# Patient Record
Sex: MALE | Race: WHITE | NOT HISPANIC OR LATINO | URBAN - METROPOLITAN AREA
[De-identification: names, ages, dates, MRNs, and addresses within clinical notes are randomized per-mention and may not be internally consistent; named-entity substitution may affect disease eponyms.]

---

## 2021-03-04 ENCOUNTER — APPOINTMENT (OUTPATIENT)
Dept: CT IMAGING | Facility: HOSPITAL | Age: 66
End: 2021-03-04

## 2021-03-04 ENCOUNTER — APPOINTMENT (OUTPATIENT)
Dept: CARDIOLOGY | Facility: HOSPITAL | Age: 66
End: 2021-03-04

## 2021-03-04 ENCOUNTER — APPOINTMENT (OUTPATIENT)
Dept: GENERAL RADIOLOGY | Facility: HOSPITAL | Age: 66
End: 2021-03-04

## 2021-03-04 ENCOUNTER — HOSPITAL ENCOUNTER (INPATIENT)
Facility: HOSPITAL | Age: 66
LOS: 7 days | Discharge: NURSING, REHAB OR LONG-TERM CARE | End: 2021-03-11
Attending: EMERGENCY MEDICINE | Admitting: INTERNAL MEDICINE

## 2021-03-04 DIAGNOSIS — F32.2 SEVERE DEPRESSION (HCC): ICD-10-CM

## 2021-03-04 DIAGNOSIS — J18.9 PNEUMONIA DUE TO INFECTIOUS ORGANISM, UNSPECIFIED LATERALITY, UNSPECIFIED PART OF LUNG: Primary | ICD-10-CM

## 2021-03-04 DIAGNOSIS — R09.02 HYPOXIA: ICD-10-CM

## 2021-03-04 PROBLEM — G47.33 OSA (OBSTRUCTIVE SLEEP APNEA): Status: ACTIVE | Noted: 2021-03-04

## 2021-03-04 PROBLEM — J44.9 COPD (CHRONIC OBSTRUCTIVE PULMONARY DISEASE) (HCC): Status: ACTIVE | Noted: 2021-03-04

## 2021-03-04 PROBLEM — F03.90 DEMENTIA (HCC): Status: ACTIVE | Noted: 2021-03-04

## 2021-03-04 PROBLEM — I50.32 DIASTOLIC CHF, CHRONIC (HCC): Status: ACTIVE | Noted: 2021-03-04

## 2021-03-04 PROBLEM — E78.5 HLD (HYPERLIPIDEMIA): Status: ACTIVE | Noted: 2021-03-04

## 2021-03-04 PROBLEM — E11.9 DM (DIABETES MELLITUS), TYPE 2 (HCC): Status: ACTIVE | Noted: 2021-03-04

## 2021-03-04 PROBLEM — I10 HTN (HYPERTENSION): Status: ACTIVE | Noted: 2021-03-04

## 2021-03-04 PROBLEM — E87.20 LACTIC ACIDOSIS: Status: ACTIVE | Noted: 2021-03-04

## 2021-03-04 PROBLEM — R79.89 ELEVATED D-DIMER: Status: ACTIVE | Noted: 2021-03-04

## 2021-03-04 PROBLEM — D72.829 LEUKOCYTOSIS: Status: ACTIVE | Noted: 2021-03-04

## 2021-03-04 PROBLEM — Z86.73 HISTORY OF STROKE: Status: ACTIVE | Noted: 2021-03-04

## 2021-03-04 PROBLEM — K21.9 GERD WITHOUT ESOPHAGITIS: Status: ACTIVE | Noted: 2021-03-04

## 2021-03-04 LAB
ALBUMIN SERPL-MCNC: 2.4 G/DL (ref 3.5–5.2)
ALBUMIN/GLOB SERPL: 0.6 G/DL
ALP SERPL-CCNC: 76 U/L (ref 39–117)
ALT SERPL W P-5'-P-CCNC: 49 U/L (ref 1–41)
ANION GAP SERPL CALCULATED.3IONS-SCNC: 14 MMOL/L (ref 5–15)
ARTERIAL PATENCY WRIST A: POSITIVE
AST SERPL-CCNC: 42 U/L (ref 1–40)
ATMOSPHERIC PRESS: 751.7 MMHG
B PARAPERT DNA SPEC QL NAA+PROBE: NOT DETECTED
B PERT DNA SPEC QL NAA+PROBE: NOT DETECTED
BASE EXCESS BLDA CALC-SCNC: -0.1 MMOL/L (ref 0–2)
BASOPHILS # BLD AUTO: 0.07 10*3/MM3 (ref 0–0.2)
BASOPHILS NFR BLD AUTO: 0.4 % (ref 0–1.5)
BDY SITE: ABNORMAL
BH CV LOWER VASCULAR LEFT COMMON FEMORAL AUGMENT: NORMAL
BH CV LOWER VASCULAR LEFT COMMON FEMORAL COMPETENT: NORMAL
BH CV LOWER VASCULAR LEFT COMMON FEMORAL COMPRESS: NORMAL
BH CV LOWER VASCULAR LEFT COMMON FEMORAL PHASIC: NORMAL
BH CV LOWER VASCULAR LEFT COMMON FEMORAL SPONT: NORMAL
BH CV LOWER VASCULAR LEFT DISTAL FEMORAL COMPRESS: NORMAL
BH CV LOWER VASCULAR LEFT GASTRONEMIUS COMPRESS: NORMAL
BH CV LOWER VASCULAR LEFT GREATER SAPH AK COMPRESS: NORMAL
BH CV LOWER VASCULAR LEFT GREATER SAPH BK COMPRESS: NORMAL
BH CV LOWER VASCULAR LEFT LESSER SAPH COMPRESS: NORMAL
BH CV LOWER VASCULAR LEFT MID FEMORAL AUGMENT: NORMAL
BH CV LOWER VASCULAR LEFT MID FEMORAL COMPETENT: NORMAL
BH CV LOWER VASCULAR LEFT MID FEMORAL COMPRESS: NORMAL
BH CV LOWER VASCULAR LEFT MID FEMORAL PHASIC: NORMAL
BH CV LOWER VASCULAR LEFT MID FEMORAL SPONT: NORMAL
BH CV LOWER VASCULAR LEFT PERONEAL COMPRESS: NORMAL
BH CV LOWER VASCULAR LEFT POPLITEAL AUGMENT: NORMAL
BH CV LOWER VASCULAR LEFT POPLITEAL COMPETENT: NORMAL
BH CV LOWER VASCULAR LEFT POPLITEAL COMPRESS: NORMAL
BH CV LOWER VASCULAR LEFT POPLITEAL PHASIC: NORMAL
BH CV LOWER VASCULAR LEFT POPLITEAL SPONT: NORMAL
BH CV LOWER VASCULAR LEFT POSTERIOR TIBIAL COMPRESS: NORMAL
BH CV LOWER VASCULAR LEFT PROFUNDA FEMORAL COMPRESS: NORMAL
BH CV LOWER VASCULAR LEFT PROXIMAL FEMORAL COMPRESS: NORMAL
BH CV LOWER VASCULAR LEFT SAPHENOFEMORAL JUNCTION COMPRESS: NORMAL
BH CV LOWER VASCULAR RIGHT COMMON FEMORAL AUGMENT: NORMAL
BH CV LOWER VASCULAR RIGHT COMMON FEMORAL COMPETENT: NORMAL
BH CV LOWER VASCULAR RIGHT COMMON FEMORAL COMPRESS: NORMAL
BH CV LOWER VASCULAR RIGHT COMMON FEMORAL PHASIC: NORMAL
BH CV LOWER VASCULAR RIGHT COMMON FEMORAL SPONT: NORMAL
BH CV LOWER VASCULAR RIGHT DISTAL FEMORAL COMPRESS: NORMAL
BH CV LOWER VASCULAR RIGHT GASTRONEMIUS COMPRESS: NORMAL
BH CV LOWER VASCULAR RIGHT GREATER SAPH AK COMPRESS: NORMAL
BH CV LOWER VASCULAR RIGHT GREATER SAPH BK COMPRESS: NORMAL
BH CV LOWER VASCULAR RIGHT LESSER SAPH COMPRESS: NORMAL
BH CV LOWER VASCULAR RIGHT MID FEMORAL AUGMENT: NORMAL
BH CV LOWER VASCULAR RIGHT MID FEMORAL COMPETENT: NORMAL
BH CV LOWER VASCULAR RIGHT MID FEMORAL COMPRESS: NORMAL
BH CV LOWER VASCULAR RIGHT MID FEMORAL PHASIC: NORMAL
BH CV LOWER VASCULAR RIGHT MID FEMORAL SPONT: NORMAL
BH CV LOWER VASCULAR RIGHT PERONEAL COMPRESS: NORMAL
BH CV LOWER VASCULAR RIGHT POPLITEAL AUGMENT: NORMAL
BH CV LOWER VASCULAR RIGHT POPLITEAL COMPETENT: NORMAL
BH CV LOWER VASCULAR RIGHT POPLITEAL COMPRESS: NORMAL
BH CV LOWER VASCULAR RIGHT POPLITEAL PHASIC: NORMAL
BH CV LOWER VASCULAR RIGHT POPLITEAL SPONT: NORMAL
BH CV LOWER VASCULAR RIGHT POSTERIOR TIBIAL COMPRESS: NORMAL
BH CV LOWER VASCULAR RIGHT PROFUNDA FEMORAL COMPRESS: NORMAL
BH CV LOWER VASCULAR RIGHT PROXIMAL FEMORAL COMPRESS: NORMAL
BH CV LOWER VASCULAR RIGHT SAPHENOFEMORAL JUNCTION COMPRESS: NORMAL
BILIRUB SERPL-MCNC: 0.8 MG/DL (ref 0–1.2)
BUN SERPL-MCNC: 18 MG/DL (ref 8–23)
BUN/CREAT SERPL: 23.4 (ref 7–25)
C PNEUM DNA NPH QL NAA+NON-PROBE: NOT DETECTED
CALCIUM SPEC-SCNC: 8.7 MG/DL (ref 8.6–10.5)
CHLORIDE SERPL-SCNC: 100 MMOL/L (ref 98–107)
CO2 SERPL-SCNC: 22 MMOL/L (ref 22–29)
CREAT SERPL-MCNC: 0.77 MG/DL (ref 0.76–1.27)
D DIMER PPP FEU-MCNC: 0.72 MCGFEU/ML (ref 0–0.49)
D-LACTATE SERPL-SCNC: 1.5 MMOL/L (ref 0.5–2)
D-LACTATE SERPL-SCNC: 2.1 MMOL/L (ref 0.5–2)
DEPRECATED RDW RBC AUTO: 44.2 FL (ref 37–54)
EOSINOPHIL # BLD AUTO: 0.02 10*3/MM3 (ref 0–0.4)
EOSINOPHIL NFR BLD AUTO: 0.1 % (ref 0.3–6.2)
ERYTHROCYTE [DISTWIDTH] IN BLOOD BY AUTOMATED COUNT: 15.4 % (ref 12.3–15.4)
FLUAV SUBTYP SPEC NAA+PROBE: NOT DETECTED
FLUBV RNA ISLT QL NAA+PROBE: NOT DETECTED
GAS FLOW AIRWAY: 6 LPM
GFR SERPL CREATININE-BSD FRML MDRD: 102 ML/MIN/1.73
GLOBULIN UR ELPH-MCNC: 4.2 GM/DL
GLUCOSE BLDC GLUCOMTR-MCNC: 108 MG/DL (ref 70–130)
GLUCOSE BLDC GLUCOMTR-MCNC: 116 MG/DL (ref 70–130)
GLUCOSE BLDC GLUCOMTR-MCNC: 116 MG/DL (ref 70–130)
GLUCOSE SERPL-MCNC: 136 MG/DL (ref 65–99)
HADV DNA SPEC NAA+PROBE: NOT DETECTED
HCO3 BLDA-SCNC: 25.1 MMOL/L (ref 22–28)
HCOV 229E RNA SPEC QL NAA+PROBE: NOT DETECTED
HCOV HKU1 RNA SPEC QL NAA+PROBE: NOT DETECTED
HCOV NL63 RNA SPEC QL NAA+PROBE: NOT DETECTED
HCOV OC43 RNA SPEC QL NAA+PROBE: NOT DETECTED
HCT VFR BLD AUTO: 42.3 % (ref 37.5–51)
HGB BLD-MCNC: 14.4 G/DL (ref 13–17.7)
HMPV RNA NPH QL NAA+NON-PROBE: NOT DETECTED
HPIV1 RNA SPEC QL NAA+PROBE: NOT DETECTED
HPIV2 RNA SPEC QL NAA+PROBE: NOT DETECTED
HPIV3 RNA NPH QL NAA+PROBE: NOT DETECTED
HPIV4 P GENE NPH QL NAA+PROBE: NOT DETECTED
IMM GRANULOCYTES # BLD AUTO: 0.24 10*3/MM3 (ref 0–0.05)
IMM GRANULOCYTES NFR BLD AUTO: 1.3 % (ref 0–0.5)
L PNEUMO1 AG UR QL IA: NEGATIVE
LACTATE HOLD SPECIMEN: NORMAL
LIPASE SERPL-CCNC: 7 U/L (ref 13–60)
LYMPHOCYTES # BLD AUTO: 0.86 10*3/MM3 (ref 0.7–3.1)
LYMPHOCYTES NFR BLD AUTO: 4.6 % (ref 19.6–45.3)
M PNEUMO IGG SER IA-ACNC: NOT DETECTED
MCH RBC QN AUTO: 27.7 PG (ref 26.6–33)
MCHC RBC AUTO-ENTMCNC: 34 G/DL (ref 31.5–35.7)
MCV RBC AUTO: 81.3 FL (ref 79–97)
MODALITY: ABNORMAL
MONOCYTES # BLD AUTO: 1.13 10*3/MM3 (ref 0.1–0.9)
MONOCYTES NFR BLD AUTO: 6 % (ref 5–12)
MRSA DNA SPEC QL NAA+PROBE: NORMAL
NEUTROPHILS NFR BLD AUTO: 16.47 10*3/MM3 (ref 1.7–7)
NEUTROPHILS NFR BLD AUTO: 87.6 % (ref 42.7–76)
NRBC BLD AUTO-RTO: 0.1 /100 WBC (ref 0–0.2)
NT-PROBNP SERPL-MCNC: 383.8 PG/ML (ref 0–900)
PCO2 BLDA: 41.7 MM HG (ref 35–45)
PH BLDA: 7.39 PH UNITS (ref 7.35–7.45)
PLATELET # BLD AUTO: 369 10*3/MM3 (ref 140–450)
PMV BLD AUTO: 9.3 FL (ref 6–12)
PO2 BLDA: 55.9 MM HG (ref 80–100)
POTASSIUM SERPL-SCNC: 3.5 MMOL/L (ref 3.5–5.2)
PROCALCITONIN SERPL-MCNC: 0.17 NG/ML (ref 0–0.25)
PROT SERPL-MCNC: 6.6 G/DL (ref 6–8.5)
QT INTERVAL: 382 MS
RBC # BLD AUTO: 5.2 10*6/MM3 (ref 4.14–5.8)
RHINOVIRUS RNA SPEC NAA+PROBE: NOT DETECTED
RSV RNA NPH QL NAA+NON-PROBE: NOT DETECTED
S PNEUM AG SPEC QL LA: NEGATIVE
SAO2 % BLDCOA: 88.2 % (ref 92–99)
SARS-COV-2 ORF1AB RESP QL NAA+PROBE: NOT DETECTED
SARS-COV-2 RNA NPH QL NAA+NON-PROBE: NOT DETECTED
SET MECH RESP RATE: 22
SODIUM SERPL-SCNC: 136 MMOL/L (ref 136–145)
T4 FREE SERPL-MCNC: 1.23 NG/DL (ref 0.93–1.7)
TROPONIN T SERPL-MCNC: <0.01 NG/ML (ref 0–0.03)
WBC # BLD AUTO: 18.79 10*3/MM3 (ref 3.4–10.8)

## 2021-03-04 PROCEDURE — 87899 AGENT NOS ASSAY W/OPTIC: CPT | Performed by: NURSE PRACTITIONER

## 2021-03-04 PROCEDURE — 82962 GLUCOSE BLOOD TEST: CPT

## 2021-03-04 PROCEDURE — 82803 BLOOD GASES ANY COMBINATION: CPT

## 2021-03-04 PROCEDURE — 36600 WITHDRAWAL OF ARTERIAL BLOOD: CPT

## 2021-03-04 PROCEDURE — 87040 BLOOD CULTURE FOR BACTERIA: CPT | Performed by: PHYSICIAN ASSISTANT

## 2021-03-04 PROCEDURE — 0 IOPAMIDOL PER 1 ML: Performed by: EMERGENCY MEDICINE

## 2021-03-04 PROCEDURE — 93970 EXTREMITY STUDY: CPT

## 2021-03-04 PROCEDURE — U0004 COV-19 TEST NON-CDC HGH THRU: HCPCS | Performed by: PHYSICIAN ASSISTANT

## 2021-03-04 PROCEDURE — 85025 COMPLETE CBC W/AUTO DIFF WBC: CPT | Performed by: PHYSICIAN ASSISTANT

## 2021-03-04 PROCEDURE — 25010000002 FUROSEMIDE PER 20 MG: Performed by: NURSE PRACTITIONER

## 2021-03-04 PROCEDURE — 94760 N-INVAS EAR/PLS OXIMETRY 1: CPT

## 2021-03-04 PROCEDURE — 83605 ASSAY OF LACTIC ACID: CPT | Performed by: PHYSICIAN ASSISTANT

## 2021-03-04 PROCEDURE — 94799 UNLISTED PULMONARY SVC/PX: CPT

## 2021-03-04 PROCEDURE — 25010000002 PIPERACILLIN SOD-TAZOBACTAM PER 1 G: Performed by: HOSPITALIST

## 2021-03-04 PROCEDURE — 99284 EMERGENCY DEPT VISIT MOD MDM: CPT

## 2021-03-04 PROCEDURE — 83880 ASSAY OF NATRIURETIC PEPTIDE: CPT | Performed by: PHYSICIAN ASSISTANT

## 2021-03-04 PROCEDURE — 25010000002 METHYLPREDNISOLONE PER 40 MG: Performed by: INTERNAL MEDICINE

## 2021-03-04 PROCEDURE — 25010000002 VANCOMYCIN 10 G RECONSTITUTED SOLUTION: Performed by: HOSPITALIST

## 2021-03-04 PROCEDURE — U0005 INFEC AGEN DETEC AMPLI PROBE: HCPCS | Performed by: PHYSICIAN ASSISTANT

## 2021-03-04 PROCEDURE — 71045 X-RAY EXAM CHEST 1 VIEW: CPT

## 2021-03-04 PROCEDURE — 83690 ASSAY OF LIPASE: CPT | Performed by: PHYSICIAN ASSISTANT

## 2021-03-04 PROCEDURE — 25010000002 ENOXAPARIN PER 10 MG: Performed by: HOSPITALIST

## 2021-03-04 PROCEDURE — 84484 ASSAY OF TROPONIN QUANT: CPT | Performed by: PHYSICIAN ASSISTANT

## 2021-03-04 PROCEDURE — 93005 ELECTROCARDIOGRAM TRACING: CPT | Performed by: PHYSICIAN ASSISTANT

## 2021-03-04 PROCEDURE — 87641 MR-STAPH DNA AMP PROBE: CPT | Performed by: HOSPITALIST

## 2021-03-04 PROCEDURE — 93010 ELECTROCARDIOGRAM REPORT: CPT | Performed by: INTERNAL MEDICINE

## 2021-03-04 PROCEDURE — 80053 COMPREHEN METABOLIC PANEL: CPT | Performed by: PHYSICIAN ASSISTANT

## 2021-03-04 PROCEDURE — 25010000002 ENOXAPARIN PER 10 MG: Performed by: NURSE PRACTITIONER

## 2021-03-04 PROCEDURE — 72110 X-RAY EXAM L-2 SPINE 4/>VWS: CPT

## 2021-03-04 PROCEDURE — 84145 PROCALCITONIN (PCT): CPT | Performed by: HOSPITALIST

## 2021-03-04 PROCEDURE — 71275 CT ANGIOGRAPHY CHEST: CPT

## 2021-03-04 PROCEDURE — 25010000002 PIPERACILLIN SOD-TAZOBACTAM PER 1 G: Performed by: PHYSICIAN ASSISTANT

## 2021-03-04 PROCEDURE — 25010000002 VANCOMYCIN 10 G RECONSTITUTED SOLUTION: Performed by: PHYSICIAN ASSISTANT

## 2021-03-04 PROCEDURE — 94660 CPAP INITIATION&MGMT: CPT

## 2021-03-04 PROCEDURE — 84439 ASSAY OF FREE THYROXINE: CPT | Performed by: INTERNAL MEDICINE

## 2021-03-04 PROCEDURE — 0202U NFCT DS 22 TRGT SARS-COV-2: CPT | Performed by: INTERNAL MEDICINE

## 2021-03-04 PROCEDURE — 85379 FIBRIN DEGRADATION QUANT: CPT | Performed by: PHYSICIAN ASSISTANT

## 2021-03-04 RX ORDER — ALLOPURINOL 100 MG/1
200 TABLET ORAL DAILY
COMMUNITY

## 2021-03-04 RX ORDER — DIVALPROEX SODIUM 250 MG/1
250 TABLET, DELAYED RELEASE ORAL 2 TIMES DAILY
COMMUNITY

## 2021-03-04 RX ORDER — BUSPIRONE HYDROCHLORIDE 15 MG/1
15 TABLET ORAL 3 TIMES DAILY
COMMUNITY

## 2021-03-04 RX ORDER — PREDNISONE 20 MG/1
40 TABLET ORAL DAILY
COMMUNITY
Start: 2021-03-04 | End: 2021-03-11 | Stop reason: HOSPADM

## 2021-03-04 RX ORDER — MONTELUKAST SODIUM 10 MG/1
10 TABLET ORAL NIGHTLY
COMMUNITY

## 2021-03-04 RX ORDER — LEVOTHYROXINE SODIUM 0.03 MG/1
25 TABLET ORAL DAILY
COMMUNITY

## 2021-03-04 RX ORDER — LISINOPRIL 2.5 MG/1
2.5 TABLET ORAL DAILY
COMMUNITY

## 2021-03-04 RX ORDER — ALBUTEROL SULFATE 90 UG/1
2 AEROSOL, METERED RESPIRATORY (INHALATION) EVERY 4 HOURS PRN
COMMUNITY

## 2021-03-04 RX ORDER — BUPROPION HYDROCHLORIDE 150 MG/1
450 TABLET, EXTENDED RELEASE ORAL DAILY
COMMUNITY

## 2021-03-04 RX ORDER — METOPROLOL TARTRATE 50 MG/1
50 TABLET, FILM COATED ORAL 2 TIMES DAILY
COMMUNITY

## 2021-03-04 RX ORDER — FUROSEMIDE 10 MG/ML
40 INJECTION INTRAMUSCULAR; INTRAVENOUS ONCE
Status: COMPLETED | OUTPATIENT
Start: 2021-03-04 | End: 2021-03-04

## 2021-03-04 RX ORDER — ONDANSETRON 4 MG/1
4 TABLET, FILM COATED ORAL EVERY 6 HOURS PRN
Status: DISCONTINUED | OUTPATIENT
Start: 2021-03-04 | End: 2021-03-11 | Stop reason: HOSPADM

## 2021-03-04 RX ORDER — DIVALPROEX SODIUM 125 MG/1
125 TABLET, DELAYED RELEASE ORAL 2 TIMES DAILY
COMMUNITY
Start: 2021-03-03 | End: 2021-03-11 | Stop reason: HOSPADM

## 2021-03-04 RX ORDER — METHYLPREDNISOLONE SODIUM SUCCINATE 40 MG/ML
40 INJECTION, POWDER, LYOPHILIZED, FOR SOLUTION INTRAMUSCULAR; INTRAVENOUS EVERY 8 HOURS
Status: DISCONTINUED | OUTPATIENT
Start: 2021-03-04 | End: 2021-03-05

## 2021-03-04 RX ORDER — PANTOPRAZOLE SODIUM 40 MG/1
40 TABLET, DELAYED RELEASE ORAL DAILY
COMMUNITY

## 2021-03-04 RX ORDER — LEVOTHYROXINE SODIUM 20 UG/ML
18.75 INJECTION, SOLUTION INTRAVENOUS
Status: DISCONTINUED | OUTPATIENT
Start: 2021-03-04 | End: 2021-03-05

## 2021-03-04 RX ORDER — ASPIRIN 81 MG/1
81 TABLET ORAL DAILY
COMMUNITY

## 2021-03-04 RX ORDER — BISACODYL 10 MG
10 SUPPOSITORY, RECTAL RECTAL DAILY PRN
Status: DISCONTINUED | OUTPATIENT
Start: 2021-03-04 | End: 2021-03-11 | Stop reason: HOSPADM

## 2021-03-04 RX ORDER — ATORVASTATIN CALCIUM 20 MG/1
20 TABLET, FILM COATED ORAL NIGHTLY
COMMUNITY

## 2021-03-04 RX ORDER — LEVOTHYROXINE SODIUM 20 UG/ML
12.5 INJECTION, SOLUTION INTRAVENOUS
Status: DISCONTINUED | OUTPATIENT
Start: 2021-03-04 | End: 2021-03-04

## 2021-03-04 RX ORDER — SODIUM CHLORIDE 0.9 % (FLUSH) 0.9 %
10 SYRINGE (ML) INJECTION AS NEEDED
Status: DISCONTINUED | OUTPATIENT
Start: 2021-03-04 | End: 2021-03-11 | Stop reason: HOSPADM

## 2021-03-04 RX ORDER — ALPRAZOLAM 0.5 MG/1
0.5 TABLET ORAL EVERY 6 HOURS PRN
COMMUNITY
End: 2021-03-11 | Stop reason: HOSPADM

## 2021-03-04 RX ORDER — ALUMINA, MAGNESIA, AND SIMETHICONE 2400; 2400; 240 MG/30ML; MG/30ML; MG/30ML
15 SUSPENSION ORAL EVERY 6 HOURS PRN
Status: DISCONTINUED | OUTPATIENT
Start: 2021-03-04 | End: 2021-03-11 | Stop reason: HOSPADM

## 2021-03-04 RX ORDER — ONDANSETRON 2 MG/ML
4 INJECTION INTRAMUSCULAR; INTRAVENOUS EVERY 6 HOURS PRN
Status: DISCONTINUED | OUTPATIENT
Start: 2021-03-04 | End: 2021-03-11 | Stop reason: HOSPADM

## 2021-03-04 RX ORDER — NITROGLYCERIN 0.4 MG/1
0.4 TABLET SUBLINGUAL
Status: DISCONTINUED | OUTPATIENT
Start: 2021-03-04 | End: 2021-03-11 | Stop reason: HOSPADM

## 2021-03-04 RX ORDER — BUDESONIDE AND FORMOTEROL FUMARATE DIHYDRATE 80; 4.5 UG/1; UG/1
2 AEROSOL RESPIRATORY (INHALATION)
COMMUNITY

## 2021-03-04 RX ORDER — NYSTATIN 10B UNIT
1 POWDER (EA) MISCELLANEOUS DAILY
COMMUNITY

## 2021-03-04 RX ORDER — CIPROFLOXACIN HYDROCHLORIDE 3.5 MG/ML
2 SOLUTION/ DROPS TOPICAL EVERY 4 HOURS
COMMUNITY
Start: 2021-02-25 | End: 2021-03-11 | Stop reason: HOSPADM

## 2021-03-04 RX ORDER — CLOPIDOGREL BISULFATE 75 MG/1
75 TABLET ORAL DAILY
COMMUNITY

## 2021-03-04 RX ORDER — NYSTATIN 100000 [USP'U]/G
POWDER TOPICAL EVERY 12 HOURS SCHEDULED
Status: DISCONTINUED | OUTPATIENT
Start: 2021-03-04 | End: 2021-03-11 | Stop reason: HOSPADM

## 2021-03-04 RX ORDER — ACETAMINOPHEN 325 MG/1
650 TABLET ORAL EVERY 4 HOURS PRN
Status: DISCONTINUED | OUTPATIENT
Start: 2021-03-04 | End: 2021-03-11 | Stop reason: HOSPADM

## 2021-03-04 RX ORDER — IPRATROPIUM BROMIDE AND ALBUTEROL SULFATE 2.5; .5 MG/3ML; MG/3ML
3 SOLUTION RESPIRATORY (INHALATION)
Status: DISCONTINUED | OUTPATIENT
Start: 2021-03-04 | End: 2021-03-11 | Stop reason: HOSPADM

## 2021-03-04 RX ORDER — BISACODYL 5 MG/1
5 TABLET, DELAYED RELEASE ORAL DAILY PRN
Status: DISCONTINUED | OUTPATIENT
Start: 2021-03-04 | End: 2021-03-11 | Stop reason: HOSPADM

## 2021-03-04 RX ADMIN — IOPAMIDOL 95 ML: 755 INJECTION, SOLUTION INTRAVENOUS at 03:52

## 2021-03-04 RX ADMIN — METOPROLOL TARTRATE 5 MG: 1 INJECTION, SOLUTION INTRAVENOUS at 12:00

## 2021-03-04 RX ADMIN — VANCOMYCIN HYDROCHLORIDE 1750 MG: 10 INJECTION, POWDER, LYOPHILIZED, FOR SOLUTION INTRAVENOUS at 06:03

## 2021-03-04 RX ADMIN — TAZOBACTAM SODIUM AND PIPERACILLIN SODIUM 3.38 G: 375; 3 INJECTION, SOLUTION INTRAVENOUS at 21:49

## 2021-03-04 RX ADMIN — ENOXAPARIN SODIUM 40 MG: 40 INJECTION SUBCUTANEOUS at 09:48

## 2021-03-04 RX ADMIN — ENOXAPARIN SODIUM 40 MG: 40 INJECTION SUBCUTANEOUS at 21:48

## 2021-03-04 RX ADMIN — TAZOBACTAM SODIUM AND PIPERACILLIN SODIUM 3.38 G: 375; 3 INJECTION, SOLUTION INTRAVENOUS at 05:25

## 2021-03-04 RX ADMIN — METHYLPREDNISOLONE SODIUM SUCCINATE 40 MG: 40 INJECTION, POWDER, FOR SOLUTION INTRAMUSCULAR; INTRAVENOUS at 15:25

## 2021-03-04 RX ADMIN — LEVOTHYROXINE SODIUM ANHYDROUS 18.75 MCG: 100 INJECTION, POWDER, LYOPHILIZED, FOR SOLUTION INTRAVENOUS at 13:57

## 2021-03-04 RX ADMIN — VANCOMYCIN HYDROCHLORIDE 1250 MG: 10 INJECTION, POWDER, LYOPHILIZED, FOR SOLUTION INTRAVENOUS at 18:00

## 2021-03-04 RX ADMIN — METHYLPREDNISOLONE SODIUM SUCCINATE 40 MG: 40 INJECTION, POWDER, FOR SOLUTION INTRAMUSCULAR; INTRAVENOUS at 23:59

## 2021-03-04 RX ADMIN — METOPROLOL TARTRATE 5 MG: 1 INJECTION, SOLUTION INTRAVENOUS at 18:00

## 2021-03-04 RX ADMIN — TAZOBACTAM SODIUM AND PIPERACILLIN SODIUM 3.38 G: 375; 3 INJECTION, SOLUTION INTRAVENOUS at 13:57

## 2021-03-04 RX ADMIN — FUROSEMIDE 40 MG: 10 INJECTION, SOLUTION INTRAMUSCULAR; INTRAVENOUS at 15:25

## 2021-03-04 RX ADMIN — IPRATROPIUM BROMIDE AND ALBUTEROL SULFATE 3 ML: 2.5; .5 SOLUTION RESPIRATORY (INHALATION) at 16:50

## 2021-03-04 RX ADMIN — NYSTATIN: 100000 POWDER TOPICAL at 11:59

## 2021-03-04 RX ADMIN — IPRATROPIUM BROMIDE AND ALBUTEROL SULFATE 3 ML: 2.5; .5 SOLUTION RESPIRATORY (INHALATION) at 20:10

## 2021-03-04 RX ADMIN — NYSTATIN: 100000 POWDER TOPICAL at 21:49

## 2021-03-04 RX ADMIN — Medication 1 APPLICATION: at 21:49

## 2021-03-04 NOTE — CONSULTS
Group: [facility]         CONSULT NOTE    Patient Identification:    Garcia Garcia  65 y.o.  male  1955  [id number]            Patient Care Team:  Vargas Ling MD as PCP - General (Family Medicine)    Requesting physician: Dr. Hillman    Reason for Consultation: Acute respiratory failure    CC: Shortness of breath, altered mental status    History of Present Illness: Patient is a 65-year-old obese white male with a past medical history significant for COPD, CHF, asthma, CVA with left hemiparesis, MITZI, depression and baseline cognitive defects/dementia who was transferred from nursing home due to severe hypoxemia and was admitted to the hospital for severe respiratory failure.  Chest x-ray showed evidence of bilateral infiltrates and COVID-19 was tested and negative.  Patient had progressive respiratory failure overnight and is requiring increasing supplemental oxygen.  Discussed with the nurse diet and ABG as well as chest x-ray done and reviewed and shows have concern for worsening infiltrates as well as severe hypoxemia.  Patient repeat tested for Covid and was placed on isolation initially but testing was negative again.  Started on steroids and bronchodilators along with empiric diuretics for potential volume overload.  Patient also having severe work of breathing and hence placed on noninvasive ventilator for the same.    Patient unable to provide any history and is significantly drowsy and responding only to verbal stimuli.  High risk for need to transfer to ICU and need for intubation and mechanical ventilation    I have reviewed the H&P as well as the notes from the other consultants taking care of the patient this admission as well as previous hospital notes (if any available) from our group physicians and summarized above      Objective     Review of Systems:  Unable to obtain due to encephalopathy    Past Medical History:  Past Medical History:   Diagnosis Date   • Acidosis     • Arthritis    • Asthma    • Atherosclerotic heart disease of native coronary artery without angina pectoris    • CHF (congestive heart failure) (CMS/[facility])    • Chronic obstructive pulmonary disease, unspecified (CMS/[facility])    • Cognitive communication deficit    • Cyst of kidney, acquired    • Elevated white blood cell count, unspecified    • Essential (primary) hypertension    • GERD (gastroesophageal reflux disease)    • Hemiplegia and hemiparesis following cerebral infarction affecting left non-dominant side (CMS/[facility])    • Hyperlipidemia, unspecified    • Major depressive disorder, recurrent, severe with psychotic symptoms (CMS/[facility])    • Muscle weakness (generalized)    • Other specified metabolic disorders (CMS/[facility])    • Secondary polycythemia    • Sepsis due to Streptococcus pneumoniae (CMS/[facility])    • Sleep apnea, unspecified    • Tachycardia, unspecified    • Toxic encephalopathy    • Type 2 diabetes mellitus with diabetic neuropathy, unspecified (CMS/[facility])    • Type 2 diabetes mellitus with hypoglycemia without coma (CMS/[facility])    • Unspecified dementia with behavioral disturbance (CMS/[facility])        Past Surgical History:  Past Surgical History:   Procedure Laterality Date   • KNEE SURGERY          Home Meds:  Medications Prior to Admission   Medication Sig Dispense Refill Last Dose   • albuterol sulfate HFA (Proventil HFA) 108 (90 Base) MCG/ACT inhaler Inhale 2 puffs Every 4 (Four) Hours As Needed for Wheezing.   3/3/2021 at Unknown time   • allopurinol (ZYLOPRIM) 100 MG tablet Take 200 mg by mouth Daily.   3/3/2021 at Unknown time   • ALPRAZolam (XANAX) 0.5 MG tablet Take 0.5 mg by mouth Every 6 (Six) Hours As Needed for Anxiety.   3/3/2021 at Unknown time   • aspirin 81 MG EC tablet Take 81 mg by mouth Daily.   3/3/2021 at Unknown time   • atorvastatin (LIPITOR) 20 MG tablet Take 20 mg by mouth Every Night.   3/3/2021 at Unknown time   • budesonide-formoterol (SYMBICORT) 80-4.5 MCG/ACT inhaler Inhale 2 puffs 2  (Two) Times a Day.   3/3/2021 at Unknown time   • buPROPion SR (WELLBUTRIN SR) 150 MG 12 hr tablet Take 450 mg by mouth Daily.   3/3/2021 at Unknown time   • busPIRone (BUSPAR) 15 MG tablet Take 15 mg by mouth 3 (Three) Times a Day.   3/3/2021 at Unknown time   • ciprofloxacin (CILOXAN) 0.3 % ophthalmic solution Administer 2 drops into the left eye Every 4 (Four) Hours.      • clopidogrel (PLAVIX) 75 MG tablet Take 75 mg by mouth Daily.   3/3/2021 at Unknown time   • divalproex (DEPAKOTE) 125 MG DR tablet Take 125 mg by mouth 2 (Two) Times a Day.   3/3/2021 at Unknown time   • divalproex (DEPAKOTE) 250 MG DR tablet Take 250 mg by mouth 2 (Two) Times a Day.   3/3/2021 at Unknown time   • levothyroxine (SYNTHROID, LEVOTHROID) 25 MCG tablet Take 25 mcg by mouth Daily.   3/3/2021 at Unknown time   • lisinopril (PRINIVIL,ZESTRIL) 2.5 MG tablet Take 2.5 mg by mouth Daily.   3/3/2021 at Unknown time   • metoprolol tartrate (LOPRESSOR) 50 MG tablet Take 50 mg by mouth 2 (Two) Times a Day.   3/3/2021 at Unknown time   • montelukast (SINGULAIR) 10 MG tablet Take 10 mg by mouth Every Night.   3/3/2021 at Unknown time   • Nystatin powder 1 application Daily.   3/3/2021 at Unknown time   • pantoprazole (PROTONIX) 40 MG EC tablet Take 40 mg by mouth Daily.   3/3/2021 at Unknown time   • predniSONE (DELTASONE) 20 MG tablet Take 40 mg by mouth Daily.   3/3/2021 at Unknown time       Allergies:  No Known Allergies    Social History:   Social History     Socioeconomic History   • Marital status:      Spouse name: Not on file   • Number of children: Not on file   • Years of education: Not on file   • Highest education level: Not on file   Tobacco Use   • Smoking status: Unknown If Ever Smoked   Substance and Sexual Activity   • Alcohol use: Not Currently   • Drug use: Not Currently   • Sexual activity: Not Currently       Family History:  History reviewed. No pertinent family history.    Physical Exam:  /78 (BP Location:  "Left arm, Patient Position: Lying)   Pulse 88   Temp 99.6 °F (37.6 °C) (Oral)   Resp (!) 34   Ht 170.2 cm (67.01\")   Wt 124 kg (273 lb 13 oz)   SpO2 90%   BMI 42.87 kg/m²  Body mass index is 42.87 kg/m². 90% 124 kg (273 lb 13 oz)    Physical Exam     Constitutional:  Middle-aged morbidly obese white male, awake but drowsy in significant respiratory distress and use of accessory muscles.  Head: - NCAT  Eyes: No pallor, Anicteric conjunctiva, EOMI.  ENMT:  Mallampati 4, no oral thrush. Moist MM.   NECK: Trachea midline, No thyromegaly, no palpable cervical LNpathy  Heart: RRR, no murmur. Trace pedal edema   Lungs: ANNELISE +, bilateral rhonchi with decreased breath sounds at the bases.  Prolonged expiration no obvious wheeze. no  crackles heard    Abdomen: Soft.  Obese, paradoxical breathing no tenderness, guarding or rigidity. No palpable masses  Extremities: Extremities warm and well perfused. No cyanosis/ clubbing  Neuro: Conscious, drowsy and responding to commands, no new gross focal neuro deficits  Psych: Mood and affect anxious    PPE recommended per [facility] infectious disease Isolation protocol for the current clinical scenario(as mentioned below) was followed.     LABS:  Lab Results   Component Value Date    CALCIUM 8.7 03/04/2021     Results from last 7 days   Lab Units 03/04/21  0215   SODIUM mmol/L 136   POTASSIUM mmol/L 3.5   CHLORIDE mmol/L 100   CO2 mmol/L 22.0   BUN mg/dL 18   CREATININE mg/dL 0.77   GLUCOSE mg/dL 136*   CALCIUM mg/dL 8.7   WBC 10*3/mm3 18.79*   HEMOGLOBIN g/dL 14.4   PLATELETS 10*3/mm3 369   ALT (SGPT) U/L 49*   AST (SGOT) U/L 42*   PROBNP pg/mL 383.8   PROCALCITONIN ng/mL 0.17     Lab Results   Component Value Date    TROPONINT <0.010 03/04/2021         Results from last 7 days   Lab Units 03/04/21  0807 03/04/21  0332 03/04/21  0215   PROCALCITONIN ng/mL  --   --  0.17   LACTATE mmol/L 1.5 2.1*  --              Results from last 7 days   Lab Units 03/04/21  1232   PH, " ARTERIAL pH units 7.388   PO2 ART mm Hg 55.9*   PCO2, ARTERIAL mm Hg 41.7   HCO3 ART mmol/L 25.1         Imaging: I personally visualized the images of chest scans/ x-rays performed within last 3 days and summarized below.    Assessment   Acute hypoxic respiratory failure; severe and worsening  Noninvasive ventilator use  Acute metabolic encephalopathy  Concern for aspiration pneumonia  Asthma/COPD exacerbation  COVID-19 ruled out x2  Dysphagia  CVA with left hemiparesis  MITZI  Morbid obesity  Chronic benzodiazepine use  Previous smoker  Baseline cognitive defect/dementia    RECOMMENDATIONS:  Patient with progressive respiratory failure in the setting of persistent colopathy and poor secretion clearance with concern for dysphagia.  Aspiration pneumonia is a significant concern.  Agree with broad-spectrum antibiotics and narrowing down as tolerated  COVID-19 pneumonia has been suspected and ruled out with repeat testing x2.  Stat ABG drawn with no significant concern for hypercapnia but severe hypoxemia noted in spite of significant supplemental oxygen needs.  Repeat chest x-ray done. Patient with significant work of breathing and ongoing issues with encephalopathy.  We will place him on noninvasive ventilator to help with the same.  We will start steroids for potential COPD exacerbation.  He states that he is smoking but obviously history is not reliable.  BNP is negative but will trial low-dose Lasix to keep him as negative as possible given some concern for venous congestion on chest x-ray.  Agree with keeping him n.p.o. and consider Dobbhoff tube and feeding once acute issues resolve  Eventually needs an SLP evaluation  Guarded prognosis    CC - 32 mins.     Patient is a high risk for further decline in we will have a low threshold to transfer the patient to ICU.  He is a high risk for intubation mechanical ventilator    Thank you for letting me participate in the care of this pleasant patient.  I have discussed  my findings and recommendations with patient and nursing staff.     Danial Vivas MD  3/4/2021  18:00 EST

## 2021-03-04 NOTE — H&P
Patient Name:  Garcia Garcia  YOB: 1955  MRN:  [id number]  Admit Date:  3/4/2021  Patient Care Team:  Vargas Ling MD as PCP - General (Family Medicine)      Subjective   History Present Illness     Chief Complaint   Patient presents with   • Back Pain     HPI  Mr. Garcia is a 65 y.o. with a history of dementia, hypertension, hyperlipidemia, stroke, DM2, MITZI and GERD That presents to [facility] from nursing home due to shortness of breath and new requirement of oxygen supplementation. History limited due to dementia and patient's current condition, therefore most of this HPI will be taken from ED records. Patient was recently hospitalized for sepsis due to pneumonia. Apparently patient's o2 saturations were in the 80s when EMS arrived. He is now requiring 5L NC. On admission his white count and lactate were elevated. CTA negative for PE but demonstrated diffuse ground-glass opacification of the right lung with patchy areas of glass opacification of the superior left upper lobe and superior aspect of the left lower lobe which may be due to infection. He was initiated on vanc and zosyn. On my exam the patient was pretty drowsy but would arouse. Oriented to self only.     Review of Systems   Unable to perform ROS: Dementia        Personal History     Past Medical History:   Diagnosis Date   • Acidosis    • Arthritis    • Asthma    • Atherosclerotic heart disease of native coronary artery without angina pectoris    • CHF (congestive heart failure) (CMS/HCC)    • Chronic obstructive pulmonary disease, unspecified (CMS/HCC)    • Cognitive communication deficit    • Cyst of kidney, acquired    • Elevated white blood cell count, unspecified    • Essential (primary) hypertension    • GERD (gastroesophageal reflux disease)    • Hemiplegia and hemiparesis following cerebral infarction affecting left non-dominant side (CMS/HCC)    • Hyperlipidemia, unspecified    • Major  depressive disorder, recurrent, severe with psychotic symptoms (CMS/[facility])    • Muscle weakness (generalized)    • Other specified metabolic disorders (CMS/[facility])    • Secondary polycythemia    • Sepsis due to Streptococcus pneumoniae (CMS/[facility])    • Sleep apnea, unspecified    • Tachycardia, unspecified    • Toxic encephalopathy    • Type 2 diabetes mellitus with diabetic neuropathy, unspecified (CMS/[facility])    • Type 2 diabetes mellitus with hypoglycemia without coma (CMS/[facility])    • Unspecified dementia with behavioral disturbance (CMS/[facility])      Past Surgical History:   Procedure Laterality Date   • KNEE SURGERY       History reviewed. No pertinent family history.  Social History     Tobacco Use   • Smoking status: Unknown If Ever Smoked   Substance Use Topics   • Alcohol use: Not Currently   • Drug use: Not Currently     No current facility-administered medications on file prior to encounter.      Current Outpatient Medications on File Prior to Encounter   Medication Sig Dispense Refill   • albuterol sulfate HFA (Proventil HFA) 108 (90 Base) MCG/ACT inhaler Inhale 2 puffs Every 4 (Four) Hours As Needed for Wheezing.     • allopurinol (ZYLOPRIM) 100 MG tablet Take 200 mg by mouth Daily.     • ALPRAZolam (XANAX) 0.5 MG tablet Take 0.5 mg by mouth Every 6 (Six) Hours As Needed for Anxiety.     • aspirin 81 MG EC tablet Take 81 mg by mouth Daily.     • atorvastatin (LIPITOR) 20 MG tablet Take 20 mg by mouth Every Night.     • budesonide-formoterol (SYMBICORT) 80-4.5 MCG/ACT inhaler Inhale 2 puffs 2 (Two) Times a Day.     • buPROPion SR (WELLBUTRIN SR) 150 MG 12 hr tablet Take 450 mg by mouth Daily.     • busPIRone (BUSPAR) 15 MG tablet Take 15 mg by mouth 3 (Three) Times a Day.     • ciprofloxacin (CILOXAN) 0.3 % ophthalmic solution Administer 2 drops into the left eye Every 4 (Four) Hours.     • clopidogrel (PLAVIX) 75 MG tablet Take 75 mg by mouth Daily.     • divalproex (DEPAKOTE) 125 MG DR tablet Take 125 mg by mouth 2  (Two) Times a Day.     • divalproex (DEPAKOTE) 250 MG DR tablet Take 250 mg by mouth 2 (Two) Times a Day.     • levothyroxine (SYNTHROID, LEVOTHROID) 25 MCG tablet Take 25 mcg by mouth Daily.     • lisinopril (PRINIVIL,ZESTRIL) 2.5 MG tablet Take 2.5 mg by mouth Daily.     • metoprolol tartrate (LOPRESSOR) 50 MG tablet Take 50 mg by mouth 2 (Two) Times a Day.     • montelukast (SINGULAIR) 10 MG tablet Take 10 mg by mouth Every Night.     • Nystatin powder 1 application Daily.     • pantoprazole (PROTONIX) 40 MG EC tablet Take 40 mg by mouth Daily.     • predniSONE (DELTASONE) 20 MG tablet Take 40 mg by mouth Daily.       No Known Allergies    Objective    Objective     Vital Signs  Temp:  [97.1 °F (36.2 °C)-99.6 °F (37.6 °C)] 99.6 °F (37.6 °C)  Heart Rate:  [] 91  Resp:  [18-32] 32  BP: (109-142)/() 142/98  SpO2:  [89 %-93 %] 89 %  on  Flow (L/min):  [3-5] 5;   Device (Oxygen Therapy): nasal cannula  Body mass index is 42.88 kg/m².    Physical Exam  Vitals signs and nursing note reviewed.   Constitutional:       Appearance: He is obese. He is ill-appearing.      Comments: drowsy, unkempt   HENT:      Head: Normocephalic and atraumatic.   Eyes:      Extraocular Movements: Extraocular movements intact.      Conjunctiva/sclera: Conjunctivae normal.   Neck:      Musculoskeletal: Normal range of motion and neck supple.   Cardiovascular:      Rate and Rhythm: Normal rate and regular rhythm.   Pulmonary:      Breath sounds: No wheezing or rhonchi.      Comments: tachypnea, breath sounds diminished and a little hard to hear throughout given body habitus  Abdominal:      General: Bowel sounds are normal. There is no distension.      Palpations: Abdomen is soft.      Tenderness: There is no abdominal tenderness.   Musculoskeletal:         General: No swelling or tenderness.   Skin:     General: Skin is warm and dry.      Findings: Rash present.      Comments: generalized erythematous round plaque like rash    Neurological:      Mental Status: Mental status is at baseline. He is disoriented.   Psychiatric:         Cognition and Memory: Memory is impaired.         Judgment: Judgment is inappropriate.         Results Review:  I reviewed the patient's new clinical results.  I reviewed the patient's new imaging results and agree with the interpretation.  I reviewed the patient's other test results and agree with the interpretation  I personally viewed and interpreted the patient's EKG/Telemetry data  Discussed with ED provider.    Lab Results (last 24 hours)     Procedure Component Value Units Date/Time    CBC & Differential [[id number]]  (Abnormal) Collected: 03/04/21 0215    Specimen: Blood Updated: 03/04/21 0233    Narrative:      The following orders were created for panel order CBC & Differential.  Procedure                               Abnormality         Status                     ---------                               -----------         ------                     CBC Auto Differential[[id number]]        Abnormal            Final result                 Please view results for these tests on the individual orders.    Comprehensive Metabolic Panel [[id number]]  (Abnormal) Collected: 03/04/21 0215    Specimen: Blood Updated: 03/04/21 0246     Glucose 136 mg/dL      BUN 18 mg/dL      Creatinine 0.77 mg/dL      Sodium 136 mmol/L      Potassium 3.5 mmol/L      Chloride 100 mmol/L      CO2 22.0 mmol/L      Calcium 8.7 mg/dL      Total Protein 6.6 g/dL      Albumin 2.40 g/dL      ALT (SGPT) 49 U/L      AST (SGOT) 42 U/L      Alkaline Phosphatase 76 U/L      Total Bilirubin 0.8 mg/dL      eGFR Non African Amer 102 mL/min/1.73      Globulin 4.2 gm/dL      A/G Ratio 0.6 g/dL      BUN/Creatinine Ratio 23.4     Anion Gap 14.0 mmol/L     Narrative:      GFR Normal >60  Chronic Kidney Disease <60  Kidney Failure <15      Lipase [[id number]]  (Abnormal) Collected: 03/04/21 0215    Specimen: Blood Updated: 03/04/21 0245     Lipase 7  U/L     Troponin [[id number]]  (Normal) Collected: 03/04/21 0215    Specimen: Blood Updated: 03/04/21 0245     Troponin T <0.010 ng/mL     Narrative:      Troponin T Reference Range:  <= 0.03 ng/mL-   Negative for AMI  >0.03 ng/mL-     Abnormal for myocardial necrosis.  Clinicians would have to utilize clinical acumen, EKG, Troponin and serial changes to determine if it is an Acute Myocardial Infarction or myocardial injury due to an underlying chronic condition.       Results may be falsely decreased if patient taking Biotin.      BNP [[id number]]  (Normal) Collected: 03/04/21 0215    Specimen: Blood Updated: 03/04/21 0244     proBNP 383.8 pg/mL     Narrative:      Among patients with dyspnea, NT-proBNP is highly sensitive for the detection of acute congestive heart failure. In addition NT-proBNP of <300 pg/ml effectively rules out acute congestive heart failure with 99% negative predictive value.    Results may be falsely decreased if patient taking Biotin.      CBC Auto Differential [[id number]]  (Abnormal) Collected: 03/04/21 0215    Specimen: Blood Updated: 03/04/21 0233     WBC 18.79 10*3/mm3      RBC 5.20 10*6/mm3      Hemoglobin 14.4 g/dL      Hematocrit 42.3 %      MCV 81.3 fL      MCH 27.7 pg      MCHC 34.0 g/dL      RDW 15.4 %      RDW-SD 44.2 fl      MPV 9.3 fL      Platelets 369 10*3/mm3      Neutrophil % 87.6 %      Lymphocyte % 4.6 %      Monocyte % 6.0 %      Eosinophil % 0.1 %      Basophil % 0.4 %      Immature Grans % 1.3 %      Neutrophils, Absolute 16.47 10*3/mm3      Lymphocytes, Absolute 0.86 10*3/mm3      Monocytes, Absolute 1.13 10*3/mm3      Eosinophils, Absolute 0.02 10*3/mm3      Basophils, Absolute 0.07 10*3/mm3      Immature Grans, Absolute 0.24 10*3/mm3      nRBC 0.1 /100 WBC     Procalcitonin [[id number]]  (Normal) Collected: 03/04/21 0215    Specimen: Blood Updated: 03/04/21 0914     Procalcitonin 0.17 ng/mL     Narrative:      As a Marker for Sepsis (Non-Neonates):   1. <0.5 ng/mL  "represents a low risk of severe sepsis and/or septic shock.  1. >2 ng/mL represents a high risk of severe sepsis and/or septic shock.    As a Marker for Lower Respiratory Tract Infections that require antibiotic therapy:  PCT on Admission     Antibiotic Therapy             6-12 Hrs later  > 0.5                Strongly Recommended            >0.25 - <0.5         Recommended  0.1 - 0.25           Discouraged                   Remeasure/reassess PCT  <0.1                 Strongly Discouraged          Remeasure/reassess PCT      As 28 day mortality risk marker: \"Change in Procalcitonin Result\" (> 80 % or <=80 %) if Day 0 (or Day 1) and Day 4 values are available. Refer to http://www.[facility] WeatherfordYhatpct-calculator.com/   Change in PCT <=80 %   A decrease of PCT levels below or equal to 80 % defines a positive change in PCT test result representing a higher risk for 28-day all-cause mortality of patients diagnosed with severe sepsis or septic shock.  Change in PCT > 80 %   A decrease of PCT levels of more than 80 % defines a negative change in PCT result representing a lower risk for 28-day all-cause mortality of patients diagnosed with severe sepsis or septic shock.                Results may be falsely decreased if patient taking Biotin.     D-dimer, Quantitative [[id number]]  (Abnormal) Collected: 03/04/21 0217    Specimen: Blood Updated: 03/04/21 0249     D-Dimer, Quantitative 0.72 MCGFEU/mL     Narrative:      The Stago D-Dimer test used in conjunction with a clinical pretest probability (PTP) assessment model, has been approved by the FDA to rule out the presence of venous thromboembolism (VTE) in outpatients suspected of deep venous thrombosis (DVT) or pulmonary embolism (PE). The cut-off for negative predictive value is <0.50 MCGFEU/mL.    COVID PRE-OP / PRE-PROCEDURE SCREENING ORDER (NO ISOLATION) - Swab, Nasopharynx [[id number]]  (Normal) Collected: 03/04/21 0332    Specimen: Swab from Nasopharynx Updated: 03/04/21 0956    " Narrative:      The following orders were created for panel order COVID PRE-OP / PRE-PROCEDURE SCREENING ORDER (NO ISOLATION) - Swab, Nasopharynx.  Procedure                               Abnormality         Status                     ---------                               -----------         ------                     COVID-19,APTIMA PANTHER,...[[id number]]  Normal              Final result                 Please view results for these tests on the individual orders.    COVID-19,APTIMA PANTHER,JOLEEN IN-HOUSE, NP/OP SWAB IN UTM/VTM/SALINE TRANSPORT MEDIA,24 HR TAT - Swab, Nasopharynx [[id number]]  (Normal) Collected: 03/04/21 0332    Specimen: Swab from Nasopharynx Updated: 03/04/21 0956     COVID19 Not Detected    Narrative:      Fact sheet for providers: https://www.fda.gov/media/968790/download     Fact sheet for patients: https://www.fda.gov/media/406314/download    Test performed by RT PCR.    Lactic Acid, Plasma [[id number]]  (Abnormal) Collected: 03/04/21 0332    Specimen: Blood Updated: 03/04/21 0404     Lactate 2.1 mmol/L     Lactic Acid, Reflex Timer (This will reflex a repeat order 3-3:15 hours after ordered.) [[id number]] Collected: 03/04/21 0332    Specimen: Blood Updated: 03/04/21 0715     Hold Tube Hold for add-ons.     Comment: Auto resulted.       Blood Culture - Blood, Arm, Left [[id number]] Collected: 03/04/21 0333    Specimen: Blood from Arm, Left Updated: 03/04/21 0353    Blood Culture - Blood, Hand, Right [[id number]] Collected: 03/04/21 0344    Specimen: Blood from Hand, Right Updated: 03/04/21 0353    Lactic Acid, Reflex [[id number]]  (Normal) Collected: 03/04/21 0807    Specimen: Blood Updated: 03/04/21 0851     Lactate 1.5 mmol/L           Imaging Results (Last 24 Hours)     Procedure Component Value Units Date/Time    CT Angiogram Chest [[id number]] Collected: 03/04/21 0510     Updated: 03/04/21 0510    Narrative:        Patient: ELMER ROSALES  Time Out: 05:09  Exam(s): CTA CHEST     EXAM:    CT  Angiography Chest With Intravenous Contrast    CLINICAL HISTORY:     Reason for exam: r o PE.    TECHNIQUE:    Axial computed tomographic angiography images of the chest with   intravenous contrast.  CTDI is 14.80 mGy and DLP is 613.80 mGy-cm.  This   CT exam was performed according to the principle of ALARA (As Low As   Reasonably Achievable) by using one or more of the following dose   reduction techniques: automated exposure control, adjustment of the mA   and or kV according to patient size, and or use of iterative   reconstruction technique.    MIP reconstructed images were created and reviewed.    COMPARISON:    No relevant prior studies available.    FINDINGS:    Pulmonary arteries:  Unremarkable.  No pulmonary embolism.    Aorta:  No acute findings.  No thoracic aortic aneurysm.    Lungs: Calcified granulomas with calcified subcarinal right hilar lymph   nodes compatible with prior granulomatous disease.  No mass.  Ground-  glass opacification of the right lung left apex with scattered ground-  glass opacification of the superior aspect of the left lower lobe..    Pleural space:  Unremarkable.  No significant effusion.  No   pneumothorax.    Heart:  Unremarkable.  No cardiomegaly.  No significant pericardial   effusion.  No evidence of RV dysfunction.    Bones joints:  No acute fracture.  No dislocation.    Soft tissues:  Unremarkable.    Lymph nodes:  Unremarkable.  No enlarged lymph nodes.    IMPRESSION:       1. No acute pulmonary embolism.    2. Diffuse ground-glass opacification of the right lung with patchy areas   of glass opacification of the superior left upper lobe and superior   aspect of the left lower lobe which may be due to infection, inhalational   injury, or drug-induced pneumonitis.      Impression:          Electronically signed by Harlan Haskins M.D. on 03-04-21 at 0509    XR Spine Lumbar Complete 4+VW [[id number]] Collected: 03/04/21 0313     Updated: 03/04/21 0313    Narrative:         Patient: ELMER ROSALES  Time Out: 03:12  Exam(s): FILM L SPINE      EXAM:    XR Lumbosacral Spine, 2 or 3 Views    CLINICAL HISTORY:     Reason for exam: low back pain.    TECHNIQUE:    Frontal and lateral views of the lumbar spine and sacrum.    COMPARISON:    No relevant prior studies available.    FINDINGS:    Vertebrae:  Unremarkable.  No acute fracture.  Normal alignment.    Sacrum coccyx:  Unremarkable as visualized.  No acute fracture.    Disc spaces:  No acute findings.  No significant narrowing.  Small   multilevel osteophytes throughout the thoracic spine.    Soft tissues:  Unremarkable.  Heart vascular calcifications.    IMPRESSION:       No acute fractures or spondylolisthesis of the lumbar spine.    Impression:          Electronically signed by Harlan Haskins M.D. on 03-04-21 at 0312    XR Chest 1 View [[id number]] Collected: 03/04/21 0259     Updated: 03/04/21 0259    Narrative:        Patient: ELMER ROSALES  Time Out: 02:58  Exam(s): FILM CXR 1 VIEW     EXAM:    XR Chest, 1 View    CLINICAL HISTORY:     Reason for exam: SOA.    TECHNIQUE:    Frontal view of the chest.    COMPARISON:    No relevant prior studies available.    FINDINGS:    Lungs: Low lung volumes and mild perihilar and right upper lobe   reticular opacities.  No consolidation.    Pleural space:  No pleural effusion.  No pneumothorax.    Heart:  Unremarkable.  No cardiomegaly.    Mediastinum:  Unremarkable.    Bones joints:  Unremarkable.    IMPRESSION:       Low lung volumes and mild perihilar and right upper lobe reticular   opacities which may be due to edema or infection.     Impression:          Electronically signed by Harlan Haskins M.D. on 03-04-21 at 0258               ECG 12 Lead   Preliminary Result   HEART RATE= 99  bpm   RR Interval= 604  ms   MO Interval= 180  ms   P Horizontal Axis= 2  deg   P Front Axis= 61  deg   QRSD Interval= 88  ms   QT Interval= 382  ms   QRS Axis= 64  deg   T Wave Axis= 38  deg   -  BORDERLINE ECG -   Sinus rhythm   Borderline prolonged QT interval   Electronically Signed By:    Date and Time of Study: 2021-03-04 03:27:13           Assessment/Plan     Active Hospital Problems    Diagnosis  POA   • **Pneumonia due to infectious organism [J18.9]  Yes   • DM (diabetes mellitus), type 2 (CMS/HCC) [E11.9]  Yes   • MITZI (obstructive sleep apnea) [G47.33]  Yes   • Severe depression (CMS/[facility]) [F32.2]  Yes   • HLD (hyperlipidemia) [E78.5]  Yes   • HTN (hypertension) [I10]  Yes   • History of stroke [Z86.73]  Not Applicable   • GERD without esophagitis [K21.9]  Yes   • Diastolic CHF, chronic (CMS/[facility]) [I50.32]  Yes   • COPD (chronic obstructive pulmonary disease) (CMS/[facility]) [J44.9]  Yes   • Dementia (CMS/[facility]) [F03.90]  Yes   • Lactic acidosis [E87.2]  Unknown   • Leukocytosis [D72.829]  Unknown      Resolved Hospital Problems   No resolved problems to display.     · PNA: pulmonology consulted. continue vanc and zosyn. check MRSA, urine antigens and ABG. blood cultures pending. wean o2 as tolerated. aggressive pulmonary hygiene. Keep NPO until evaluated by ST. Monitor labs.    · Back pain: imaging negative, pain control. PT/OT.  · COPD/MITZI: continue home therapy, await ABG results.  · HTN: resume home meds once available and once evaluated by ST. will add scheduled lopressor for now.   · Hypothyroidism: change to IV synthroid while NPO   · Lactic acidosis: resolved   · DM 2: listed in history though not on any meds. check a1c and achs for now.   · I discussed the patient's findings and my recommendations with patient and nursing staff.    VTE Prophylaxis - SCDs.  Code Status - Full code.       RENETTA Epstein  [facility]  03/04/21  11:28 EST

## 2021-03-04 NOTE — CONSULTS
"Adult Nutrition  Assessment/PES    Patient Name:  Garcia Garcia  YOB: 1955  MRN: [id number]  Admit Date:  3/4/2021    Assessment Date:  3/4/2021    Comments:  Nutrition consult for malnutrition severity assessment.  Admitted with PNA, sepsis.  Currently NPO.  Morbidly obese.    RD to follow up as diet is advanced.    RD working remotely due to covid-19 pandemic. Assessment completed based on review of electronic medical record. RD may be reached via secure chat or email.     Reason for Assessment     Row Name 03/04/21 1205          Reason for Assessment    Reason For Assessment  nurse/nurse practitioner consult     Diagnosis  pulmonary disease;infection/sepsis;neurologic conditions;cardiac disease;psychosocial;gastrointestinal disease;other (see comments);renal disease dementia, HLD, HTN, CVA, DM2, MITZI, GERD, arthritis, asthma, CHF, COPD, kidney cyst, hemiplegia, hemiparesis, depression, sepsis; adm with PNA, sepsis         Nutrition/Diet History     Row Name 03/04/21 1206          Nutrition/Diet History    Typical Food/Fluid Intake  NPO currently         Anthropometrics     Row Name 03/04/21 1206 03/04/21 0646       Anthropometrics    Height  170.2 cm (67.01\")  170.2 cm (67\")    Weight  --  124 kg (273 lb 13 oz)       Admit Weight    Admit Weight  -- 273# 3/4  --       Ideal Body Weight (IBW)    Ideal Body Weight (IBW) (kg)  68.12  68.1    % Ideal Body Weight  --  182.38       Body Mass Index (BMI)    BMI (kg/m2)  --  42.97    BMI Assessment  BMI 40 or greater: obesity grade III 42.72  --    Row Name 03/04/21 0536          Anthropometrics    Weight  86.2 kg (190 lb)         Labs/Tests/Procedures/Meds     Row Name 03/04/21 1207          Labs/Procedures/Meds    Lab Results Reviewed  reviewed, pertinent     Lab Results Comments  Gluc, ALT, AST, Alb, Lipase, WBC        Diagnostic Tests/Procedures    Diagnostic Test/Procedure Reviewed  reviewed, pertinent        Medications    Pertinent Medications " "Reviewed  reviewed, pertinent     Pertinent Medications Comments  lovenox, synthroid, zosyn, vanc         Physical Findings     Row Name 03/04/21 1208          Physical Findings    Overall Physical Appearance  obese;on oxygen therapy     Skin  edema;other (see comments) B=14, bruised, bilateral perirectal MASD, L mid axillary MASD, rash         Estimated/Assessed Needs     Row Name 03/04/21 1209 03/04/21 1206       Calculation Measurements    Weight Used For Calculations  67.1 kg (148 lb) IBW  --    Height  --  170.2 cm (67.01\")       Estimated/Assessed Needs    Additional Documentation  KCAL/KG (Group);Protein Requirements (Group);Fluid Requirements (Group)  --       KCAL/KG    KCAL/KG  30 Kcal/Kg (kcal);35 Kcal/Kg (kcal)  --    30 Kcal/Kg (kcal)  2013.96  --    35 Kcal/Kg (kcal)  2349.62  --       Protein Requirements    Weight Used For Protein Calculations  67.1 kg (148 lb) IBW  --    Est Protein Requirement Amount (gms/kg)  1.5 gm protein  --    Estimated Protein Requirements (gms/day)  100.7  --       Fluid Requirements    Fluid Requirements (mL/day)  2000  --    Estimated Fluid Requirement Method  RDA Method  --    RDA Method (mL)  2000  --    Row Name 03/04/21 0646          Calculation Measurements    Height  170.2 cm (67\")         Nutrition Prescription Ordered     Row Name 03/04/21 1216          Nutrition Prescription PO    Current PO Diet  NPO                 Problem/Interventions:  Problem 1     Row Name 03/04/21 1216          Nutrition Diagnoses Problem 1    Problem 1  Inadequate Intake/Infusion     Inadequate Intake Type  Oral     Macronutrient  Kcal;Protein     Etiology (related to)  MNT for Treatment/Condition     Signs/Symptoms (evidenced by)  NPO               Intervention Goal     Row Name 03/04/21 1216          Intervention Goal    General  Maintain nutrition;Reduce/improve symptoms;Disease management/therapy     PO  Initiate feeding     Weight  Appropriate weight loss         Nutrition " Intervention     Row Name 03/04/21 1216          Nutrition Intervention    RD/Tech Action  Follow Tx progress;Care plan reviewd;Await begin PO           Education/Evaluation     Row Name 03/04/21 1216          Education    Education  Will Instruct as appropriate        Monitor/Evaluation    Monitor  Per protocol;I&O;Pertinent labs;Weight;Skin status;Symptoms           Electronically signed by:  Carissa Mejia RD  03/04/21 12:17 EST

## 2021-03-04 NOTE — ED NOTES
"Nursing report ED to floor  Garcia Garcia  65 y.o.  male    HPI (triage note):   Chief Complaint   Patient presents with   • Back Pain       Admitting doctor:   Malcolm Hillman MD    Admitting diagnosis:   The primary encounter diagnosis was Pneumonia due to infectious organism, unspecified laterality, unspecified part of lung. A diagnosis of Hypoxia was also pertinent to this visit.    Code status:   Current Code Status     Date Active Code Status Order ID Comments User Context       3/4/2021 0533 CPR [id number]  Frank Butterfield, APRN ED     Advance Care Planning Activity      Questions for Current Code Status     Question Answer Comment    Code Status CPR     Medical Interventions (Level of Support Prior to Arrest) Full           Allergies:   Patient has no known allergies.    Weight:       03/04/21 0536   Weight: 86.2 kg (190 lb)       Most recent vitals:   Vitals:    03/04/21 0212 03/04/21 0406 03/04/21 0526 03/04/21 0536   BP:  (!) 133/107 109/54    BP Location:   Left arm    Patient Position:   Sitting    Pulse:  92 98    Resp:  18 20    Temp:       TempSrc:       SpO2:  90% 90%    Weight:    86.2 kg (190 lb)   Height: 170.2 cm (67\")          Active LDAs/IV Access:   Lines, Drains & Airways    Active LDAs     Name:   Placement date:   Placement time:   Site:   Days:    Peripheral IV 03/04/21 0333 Left;Upper Arm   03/04/21 0333    Arm   less than 1    Peripheral IV 03/04/21 0215 Right Antecubital   03/04/21 0215    Antecubital   less than 1                Labs (abnormal labs have a star):   Labs Reviewed   COMPREHENSIVE METABOLIC PANEL - Abnormal; Notable for the following components:       Result Value    Glucose 136 (*)     Albumin 2.40 (*)     ALT (SGPT) 49 (*)     AST (SGOT) 42 (*)     All other components within normal limits    Narrative:     GFR Normal >60  Chronic Kidney Disease <60  Kidney Failure <15     LIPASE - Abnormal; Notable for the following components:    Lipase 7 (*)     All other " components within normal limits   CBC WITH AUTO DIFFERENTIAL - Abnormal; Notable for the following components:    WBC 18.79 (*)     Neutrophil % 87.6 (*)     Lymphocyte % 4.6 (*)     Eosinophil % 0.1 (*)     Immature Grans % 1.3 (*)     Neutrophils, Absolute 16.47 (*)     Monocytes, Absolute 1.13 (*)     Immature Grans, Absolute 0.24 (*)     All other components within normal limits   D-DIMER, QUANTITATIVE - Abnormal; Notable for the following components:    D-Dimer, Quantitative 0.72 (*)     All other components within normal limits    Narrative:     The Stago D-Dimer test used in conjunction with a clinical pretest probability (PTP) assessment model, has been approved by the FDA to rule out the presence of venous thromboembolism (VTE) in outpatients suspected of deep venous thrombosis (DVT) or pulmonary embolism (PE). The cut-off for negative predictive value is <0.50 MCGFEU/mL.   LACTIC ACID, PLASMA - Abnormal; Notable for the following components:    Lactate 2.1 (*)     All other components within normal limits   TROPONIN (IN-HOUSE) - Normal    Narrative:     Troponin T Reference Range:  <= 0.03 ng/mL-   Negative for AMI  >0.03 ng/mL-     Abnormal for myocardial necrosis.  Clinicians would have to utilize clinical acumen, EKG, Troponin and serial changes to determine if it is an Acute Myocardial Infarction or myocardial injury due to an underlying chronic condition.       Results may be falsely decreased if patient taking Biotin.     BNP (IN-HOUSE) - Normal    Narrative:     Among patients with dyspnea, NT-proBNP is highly sensitive for the detection of acute congestive heart failure. In addition NT-proBNP of <300 pg/ml effectively rules out acute congestive heart failure with 99% negative predictive value.    Results may be falsely decreased if patient taking Biotin.     COVID PRE-OP / PRE-PROCEDURE SCREENING ORDER (NO ISOLATION)    Narrative:     The following orders were created for panel order COVID PRE-OP /  PRE-PROCEDURE SCREENING ORDER (NO ISOLATION) - Swab, Nasopharynx.  Procedure                               Abnormality         Status                     ---------                               -----------         ------                     COVID-19,APTIMA PANTHER,...[[id number]]                      In process                   Please view results for these tests on the individual orders.   COVID-19,APTIMA PANTHER,JOLEEN IN-HOUSE,NP/OP SWAB IN UTM/VTM/SALINE TRANSPORT MEDIA,24 HR TAT   BLOOD CULTURE   BLOOD CULTURE   URINALYSIS W/ MICROSCOPIC IF INDICATED (NO CULTURE)   LACTIC ACID REFLEX TIMER   CBC AND DIFFERENTIAL    Narrative:     The following orders were created for panel order CBC & Differential.  Procedure                               Abnormality         Status                     ---------                               -----------         ------                     CBC Auto Differential[[id number]]        Abnormal            Final result                 Please view results for these tests on the individual orders.       EKG:   ECG 12 Lead   Preliminary Result   HEART RATE= 99  bpm   RR Interval= 604  ms   MT Interval= 180  ms   P Horizontal Axis= 2  deg   P Front Axis= 61  deg   QRSD Interval= 88  ms   QT Interval= 382  ms   QRS Axis= 64  deg   T Wave Axis= 38  deg   - BORDERLINE ECG -   Sinus rhythm   Borderline prolonged QT interval   Electronically Signed By:    Date and Time of Study: 2021-03-04 03:27:13          Meds given in ED:   Medications   piperacillin-tazobactam (ZOSYN) 3.375 g in iso-osmotic dextrose 50 ml (premix) (3.375 g Intravenous New Bag 3/4/21 0525)   vancomycin (VANCOCIN) 20 mg/kg in sodium chloride 0.9 % 250 mL IVPB (has no administration in time range)   sodium chloride 0.9 % flush 10 mL (has no administration in time range)   enoxaparin (LOVENOX) syringe 40 mg (has no administration in time range)   nitroglycerin (NITROSTAT) SL tablet 0.4 mg (has no administration in time range)    acetaminophen (TYLENOL) tablet 650 mg (has no administration in time range)   bisacodyl (DULCOLAX) suppository 10 mg (has no administration in time range)   bisacodyl (DULCOLAX) EC tablet 5 mg (has no administration in time range)   ondansetron (ZOFRAN) tablet 4 mg (has no administration in time range)     Or   ondansetron (ZOFRAN) injection 4 mg (has no administration in time range)   aluminum-magnesium hydroxide-simethicone (MAALOX MAX) 400-400-40 MG/5ML suspension 15 mL (has no administration in time range)   iopamidol (ISOVUE-370) 76 % injection 100 mL (95 mL Intravenous Given by Other 3/4/21 0352)       Imaging results:  Xr Chest 1 View    Result Date: 3/4/2021  Electronically signed by Harlan Haskins M.D. on 03-04-21 at 0258    Ct Angiogram Chest    Result Date: 3/4/2021  Electronically signed by Harlan Haskins M.D. on 03-04-21 at 0509    Xr Spine Lumbar Complete 4+vw    Result Date: 3/4/2021  Electronically signed by Harlan Haskins M.D. on 03-04-21 at 0312      Ambulatory status:   - bedrest    Social issues:   Social History     Socioeconomic History   • Marital status:      Spouse name: Not on file   • Number of children: Not on file   • Years of education: Not on file   • Highest education level: Not on file   Tobacco Use   • Smoking status: Unknown If Ever Smoked   Substance and Sexual Activity   • Alcohol use: Not Currently   • Drug use: Not Currently   • Sexual activity: Not Currently    Nursing report ED to floor     Haylie Solis RN  03/04/21 8016

## 2021-03-04 NOTE — ED TRIAGE NOTES
To ER via EMS from [facility].  Per facility pt was being sent in for SOA with O2 Sat 81% 3l/nc.  Pt has hx of COPD.  Per EMS pt does not c/o SOA, but c/o low back pain that has been going on for some time.  Pt states that he almost fell out of bed and had to yell for help and they assisted him back into bed and told him he was going somewhere tonight.  Pt states he was SOA earlier, but not at this time.      Per EMS O2 Sat 93% O2 3l/nc.      Pt in mask at time of triage.  Triage staff in appropriate PPE at this time.

## 2021-03-04 NOTE — NURSING NOTE
CWOCN consult for skin.  Patient with yeast in folds of abdomen, groin, left armpit.  Additional scattered flat open lesions/ macules over abdomen and extremities.  Nystatin has been ordered; will add magic cream as steroid component may be helpful.  Noted also scattered bruising especially to legs, ankles.  Coccyx/ gluteal skin is intact.

## 2021-03-04 NOTE — PROGRESS NOTES
Pharmacy Consult: Levothyroxine PO to IV Conversion  Consult per RENETTA Richardson    Home Med: Levothyroxine 25 mcg po daily    NPO    Plan:    Give Levothyroxine 18.75 mcg IV daily (75% of oral dose).    Thank you for your consult,    Rachel Morales, [facility]  Clinical Staff Pharmacist      Reference LexiComp: IV/IM replacement in patients who are temporarily unable to receive oral therapy:    IM (off-label route), IV (off-label use): Administer ~75% to 80% of the established oral dose once daily (ZEINA [Niall 2014]; Kendall 2019).

## 2021-03-04 NOTE — PROGRESS NOTES
"Pharmacy Consult: Vancomycin & Zosyn IV Dosing  Patient: Garcia Garcia  Admission Date: 304  MRN: [id number]  : 1955    Day of vancomycin therapy: 1  Consult for Dr. Hillman  Treating: PNA  Goal AUC24, ss: 400-600 mg/L.hr     Relevant clinical data and objective history reviewed:  65 y.o. male 170.2 cm (67.01\") 124 kg (273 lb 13 oz)  Body mass index is 42.87 kg/m².  Results from last 7 days   Lab Units 21  0215   CREATININE mg/dL 0.77     Estimated Creatinine Clearance: 124 mL/min (by C-G formula based on SCr of 0.77 mg/dL).    WBC   Date Value Ref Range Status   2021 18.79 (H) 3.40 - 10.80 10*3/mm3 Final     Temp:  [97.1 °F (36.2 °C)-99.6 °F (37.6 °C)] 99.6 °F (37.6 °C)  Heart Rate:  [] 91  Resp:  [18-32] 32  BP: (109-142)/() 142/98    Intake/Output Summary (Last 24 hours) at 3/4/2021 1230  Last data filed at 3/4/2021 0550  Gross per 24 hour   Intake 50 ml   Output --   Net 50 ml     Baseline labs/radiology/cultures:    Labs:  3/4 Lactate: 2.1, 1.5  3/4 Procalcitonin: 0.17    Radiology:   3/4 CTA Chest: No PE. Diffuse ground-glass opacification of the right lung with patchy areas of glass opacification of the superior left upper lobe and superior aspect of the left lower lobe which may be due to infection, inhalational injury, or drug-induced pneumonitis.   3/4 CXR: Low lung volumes and mild perihilar and right upper lobe reticular opacities which may be due to edema or infection.     Cultures:   3/4 Blood cx x2: In process  3/4 Legionella Antigen, urine: In process  3/4 S. 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 Start Zosyn 3.375 gm IV q8hrs EI, first dose already given  2. Start Vancomycin 1250 mg IV q12hrs at 18:00 today, first dose already given  3. Vancomycin trough ordered    Bayesian analysis of the most recent level using [employer] software gives the following patient-specific pharmacokinetic parameters:              CL: 4.59 L/hr              Vd: 25.8 L              T1/2: 7.95 hr  Using these values, the current regimen gives a predicted steady-state trough of 14.9 mcg/mL and AUC24, ss of 545 mg/L.hr     Vancomycin IV Dosing & Levels History:  3/4 06:03 1750 mg x1  3/4 18:00 (due) 1250 mg q12hrs     Thank you for your consult,    Rachel Morales [facility]

## 2021-03-04 NOTE — ED PROVIDER NOTES
MD ATTESTATION NOTE    The CHANA and I have discussed this patient's history, physical exam, and treatment plan.  I have reviewed the documentation and personally had a face to face interaction with the patient. I affirm the documentation and agree with the treatment and plan.  The attached note describes my personal findings.      Garcia Garcia is a 65 y.o. male who presents to the ED with history of dementia from outside facility -with shortness of breath and is needing oxygen.  Patient is not normally on oxygen.  Was apparently satting in the 80s on room air.  Patient is only alert to person.  Was complaining of some back pain earlier.      On exam:  No acute distress, normocephalic atraumatic, supple nontender, regular rate and rhythm, diminished breath sounds bilaterally, soft nontender nondistended, moving all extremities well    Labs  Recent Results (from the past 24 hour(s))   Comprehensive Metabolic Panel    Collection Time: 03/04/21  2:15 AM    Specimen: Blood   Result Value Ref Range    Glucose 136 (H) 65 - 99 mg/dL    BUN 18 8 - 23 mg/dL    Creatinine 0.77 0.76 - 1.27 mg/dL    Sodium 136 136 - 145 mmol/L    Potassium 3.5 3.5 - 5.2 mmol/L    Chloride 100 98 - 107 mmol/L    CO2 22.0 22.0 - 29.0 mmol/L    Calcium 8.7 8.6 - 10.5 mg/dL    Total Protein 6.6 6.0 - 8.5 g/dL    Albumin 2.40 (L) 3.50 - 5.20 g/dL    ALT (SGPT) 49 (H) 1 - 41 U/L    AST (SGOT) 42 (H) 1 - 40 U/L    Alkaline Phosphatase 76 39 - 117 U/L    Total Bilirubin 0.8 0.0 - 1.2 mg/dL    eGFR Non African Amer 102 >60 mL/min/1.73    Globulin 4.2 gm/dL    A/G Ratio 0.6 g/dL    BUN/Creatinine Ratio 23.4 7.0 - 25.0    Anion Gap 14.0 5.0 - 15.0 mmol/L   Lipase    Collection Time: 03/04/21  2:15 AM    Specimen: Blood   Result Value Ref Range    Lipase 7 (L) 13 - 60 U/L   Troponin    Collection Time: 03/04/21  2:15 AM    Specimen: Blood   Result Value Ref Range    Troponin T <0.010 0.000 - 0.030 ng/mL   BNP    Collection Time: 03/04/21  2:15 AM     Specimen: Blood   Result Value Ref Range    proBNP 383.8 0.0 - 900.0 pg/mL   CBC Auto Differential    Collection Time: 03/04/21  2:15 AM    Specimen: Blood   Result Value Ref Range    WBC 18.79 (H) 3.40 - 10.80 10*3/mm3    RBC 5.20 4.14 - 5.80 10*6/mm3    Hemoglobin 14.4 13.0 - 17.7 g/dL    Hematocrit 42.3 37.5 - 51.0 %    MCV 81.3 79.0 - 97.0 fL    MCH 27.7 26.6 - 33.0 pg    MCHC 34.0 31.5 - 35.7 g/dL    RDW 15.4 12.3 - 15.4 %    RDW-SD 44.2 37.0 - 54.0 fl    MPV 9.3 6.0 - 12.0 fL    Platelets 369 140 - 450 10*3/mm3    Neutrophil % 87.6 (H) 42.7 - 76.0 %    Lymphocyte % 4.6 (L) 19.6 - 45.3 %    Monocyte % 6.0 5.0 - 12.0 %    Eosinophil % 0.1 (L) 0.3 - 6.2 %    Basophil % 0.4 0.0 - 1.5 %    Immature Grans % 1.3 (H) 0.0 - 0.5 %    Neutrophils, Absolute 16.47 (H) 1.70 - 7.00 10*3/mm3    Lymphocytes, Absolute 0.86 0.70 - 3.10 10*3/mm3    Monocytes, Absolute 1.13 (H) 0.10 - 0.90 10*3/mm3    Eosinophils, Absolute 0.02 0.00 - 0.40 10*3/mm3    Basophils, Absolute 0.07 0.00 - 0.20 10*3/mm3    Immature Grans, Absolute 0.24 (H) 0.00 - 0.05 10*3/mm3    nRBC 0.1 0.0 - 0.2 /100 WBC   D-dimer, Quantitative    Collection Time: 03/04/21  2:17 AM    Specimen: Blood   Result Value Ref Range    D-Dimer, Quantitative 0.72 (H) 0.00 - 0.49 MCGFEU/mL   ECG 12 Lead    Collection Time: 03/04/21  3:27 AM   Result Value Ref Range    QT Interval 382 ms   Lactic Acid, Plasma    Collection Time: 03/04/21  3:32 AM    Specimen: Blood   Result Value Ref Range    Lactate 2.1 (C) 0.5 - 2.0 mmol/L       Radiology  Xr Chest 1 View    Result Date: 3/4/2021  Patient: ELMER ROSALES  Time Out: 02:58 Exam(s): FILM CXR 1 VIEW EXAM:   XR Chest, 1 View CLINICAL HISTORY:    Reason for exam: SOA. TECHNIQUE:   Frontal view of the chest. COMPARISON:   No relevant prior studies available. FINDINGS:   Lungs: Low lung volumes and mild perihilar and right upper lobe reticular opacities.  No consolidation.   Pleural space:  No pleural effusion.  No pneumothorax.    Heart:  Unremarkable.  No cardiomegaly.   Mediastinum:  Unremarkable.   Bones joints:  Unremarkable. IMPRESSION:     Low lung volumes and mild perihilar and right upper lobe reticular opacities which may be due to edema or infection.     Electronically signed by Harlan Haskins M.D. on 03-04-21 at 0258    Ct Angiogram Chest    Result Date: 3/4/2021  Patient: ELMER ROSALES  Time Out: 05:09 Exam(s): CTA CHEST EXAM:   CT Angiography Chest With Intravenous Contrast CLINICAL HISTORY:    Reason for exam: r o PE. TECHNIQUE:   Axial computed tomographic angiography images of the chest with intravenous contrast.  CTDI is 14.80 mGy and DLP is 613.80 mGy-cm.  This CT exam was performed according to the principle of ALARA (As Low As Reasonably Achievable) by using one or more of the following dose reduction techniques: automated exposure control, adjustment of the mA and or kV according to patient size, and or use of iterative reconstruction technique.   MIP reconstructed images were created and reviewed. COMPARISON:   No relevant prior studies available. FINDINGS:   Pulmonary arteries:  Unremarkable.  No pulmonary embolism.   Aorta:  No acute findings.  No thoracic aortic aneurysm.   Lungs: Calcified granulomas with calcified subcarinal right hilar lymph nodes compatible with prior granulomatous disease.  No mass.  Ground- glass opacification of the right lung left apex with scattered ground- glass opacification of the superior aspect of the left lower lobe..   Pleural space:  Unremarkable.  No significant effusion.  No pneumothorax.   Heart:  Unremarkable.  No cardiomegaly.  No significant pericardial effusion.  No evidence of RV dysfunction.   Bones joints:  No acute fracture.  No dislocation.   Soft tissues:  Unremarkable.   Lymph nodes:  Unremarkable.  No enlarged lymph nodes. IMPRESSION:     1. No acute pulmonary embolism. 2. Diffuse ground-glass opacification of the right lung with patchy areas of glass opacification  of the superior left upper lobe and superior aspect of the left lower lobe which may be due to infection, inhalational injury, or drug-induced pneumonitis.     Electronically signed by Harlan Haskins M.D. on 03-04-21 at 0509    Xr Spine Lumbar Complete 4+vw    Result Date: 3/4/2021  Patient: ELMER ROSALES  Time Out: 03:12 Exam(s): FILM L SPINE  EXAM:   XR Lumbosacral Spine, 2 or 3 Views CLINICAL HISTORY:    Reason for exam: low back pain. TECHNIQUE:   Frontal and lateral views of the lumbar spine and sacrum. COMPARISON:   No relevant prior studies available. FINDINGS:   Vertebrae:  Unremarkable.  No acute fracture.  Normal alignment.   Sacrum coccyx:  Unremarkable as visualized.  No acute fracture.   Disc spaces:  No acute findings.  No significant narrowing.  Small multilevel osteophytes throughout the thoracic spine.   Soft tissues:  Unremarkable.  Heart vascular calcifications. IMPRESSION:     No acute fractures or spondylolisthesis of the lumbar spine.    Electronically signed by Harlan Haskins M.D. on 03-04-21 at 0312      Medical Decision Making:  ED Course as of Mar 04 0637   Thu Mar 04, 2021   0219 Patient usually does not use oxygen at home and was hypoxic here in the ER.  Patient's O2 sats dropped to 86% on room air.    [SS]   0234 WBC(!): 18.79 [SS]   0425 CO2: 22.0 [SS]   0426 Anion Gap: 14.0 [SS]   0430 Lactate(!!): 2.1 [SS]   0445 I rechecked patient informed him of the lab and image results including the pneumonia seen on the CTA chest.  He is currently on 4 L of oxygen and remaining between 89 to 91%.  CO2 and anion gap are normal, do not think I need to order an ABG at this time.    [SS]      ED Course User Index  [SS] Anastacia Alvarado PA-C           PPE: Both the patient and I wore a surgical mask throughout the entire patient encounter. I wore protective goggles.     Diagnosis  Final diagnoses:   Pneumonia due to infectious organism, unspecified laterality, unspecified part of lung   Hypoxia         Jimmie Parker MD  03/04/21 0532       Jimmie Parker MD  03/04/21 0637

## 2021-03-04 NOTE — SIGNIFICANT NOTE
03/04/21 1414   OTHER   Discipline speech language pathologist   Rehab Time/Intention   Session Not Performed other (see comments)  (Per RN patient not appropriate for clinical swallow eval this date secondary to alertness and concern for need for bi-pap. SLP to follow up next date for eval as appropriate.)

## 2021-03-04 NOTE — ED PROVIDER NOTES
EMERGENCY DEPARTMENT ENCOUNTER    Room Number:  07/07  Date seen:  3/4/2021  Time seen: 01:52 EST  PCP: Vargas Ling MD  Historian: Patient and [facility] staff    HPI:  Chief complaint: Shortness of breath  A complete HPI/ROS/PMH/PSH/SH/FH are unobtainable due to: Dementia  Context:Garcia Garcia is a 65 y.o. male, who presents to the ED with c/o shortness of breath and low back pain which started prior to arrival.    Per John (RN at [facility]), patient was admitted to [facility] last week from the hospital for toxic encephalopathy and sepsis pneumonia.  Patient has history of dementia.  Patient has a history of COPD but usually does not use oxygen at home.  At baseline, he is usually able to ambulate with a walker or with assistance.  In addition, patient has history of sliding off of bed frequently throughout the day and lays on the floor.  There was episode prior to arrival when patient did slide off the bed and laid on the floor for about 1 minute until he was able to get back up with help from the staff.    Patient was placed in face mask in first look. Patient was wearing facemask when I entered the room and throughout our encounter. I wore full protective equipment throughout this patient encounter including a face mask, goggles, and gloves. Hand hygiene was performed before donning protective equipment and after removal when leaving the room.      MEDICAL RECORD REVIEW      ALLERGIES  Patient has no known allergies.    PAST MEDICAL HISTORY  Active Ambulatory Problems     Diagnosis Date Noted   • No Active Ambulatory Problems     Resolved Ambulatory Problems     Diagnosis Date Noted   • No Resolved Ambulatory Problems     Past Medical History:   Diagnosis Date   • Acidosis    • Atherosclerotic heart disease of native coronary artery without angina pectoris    • Chronic obstructive pulmonary disease, unspecified (CMS/HCC)    • Cognitive communication deficit    •  Cyst of kidney, acquired    • Elevated white blood cell count, unspecified    • Essential (primary) hypertension    • GERD (gastroesophageal reflux disease)    • Hemiplegia and hemiparesis following cerebral infarction affecting left non-dominant side (CMS/[facility])    • Hyperlipidemia, unspecified    • Major depressive disorder, recurrent, severe with psychotic symptoms (CMS/[facility])    • Muscle weakness (generalized)    • Other specified metabolic disorders (CMS/[facility])    • Secondary polycythemia    • Sepsis due to Streptococcus pneumoniae (CMS/[facility])    • Sleep apnea, unspecified    • Tachycardia, unspecified    • Toxic encephalopathy    • Type 2 diabetes mellitus with diabetic neuropathy, unspecified (CMS/[facility])    • Type 2 diabetes mellitus with hypoglycemia without coma (CMS/[facility])    • Unspecified dementia with behavioral disturbance (CMS/[facility])        PAST SURGICAL HISTORY  Past Surgical History:   Procedure Laterality Date   • KNEE SURGERY         FAMILY HISTORY  History reviewed. No pertinent family history.    SOCIAL HISTORY  Social History     Socioeconomic History   • Marital status:      Spouse name: Not on file   • Number of children: Not on file   • Years of education: Not on file   • Highest education level: Not on file   Tobacco Use   • Smoking status: Unknown If Ever Smoked   Substance and Sexual Activity   • Alcohol use: Not Currently   • Drug use: Not Currently   • Sexual activity: Not Currently       REVIEW OF SYSTEMS  Review of Systems    All systems reviewed and negative except for those discussed in HPI.     PHYSICAL EXAM    ED Triage Vitals [03/04/21 0146]   Temp Heart Rate Resp BP SpO2   97.1 °F (36.2 °C) 106 22 136/82 93 %      Temp src Heart Rate Source Patient Position BP Location FiO2 (%)   Tympanic Monitor -- -- --     Physical Exam    I have reviewed the triage vital signs and nursing notes.      GENERAL: not distressed; chronically ill-appearing, obese  HENT: nares patent  EYES: no scleral  icterus  NECK: no ROM limitations  CV: regular rhythm, regular rate  RESPIRATORY: normal effort, diminished breath sounds to bilateral lower lobes  ABDOMEN: soft, nontender  : deferred  MUSCULOSKELETAL: no deformity; tenderness to bilateral lower extremities  NEURO: alert, moves all extremities, follows commands  SKIN: warm, dry    LAB RESULTS  Recent Results (from the past 24 hour(s))   Comprehensive Metabolic Panel    Collection Time: 03/04/21  2:15 AM    Specimen: Blood   Result Value Ref Range    Glucose 136 (H) 65 - 99 mg/dL    BUN 18 8 - 23 mg/dL    Creatinine 0.77 0.76 - 1.27 mg/dL    Sodium 136 136 - 145 mmol/L    Potassium 3.5 3.5 - 5.2 mmol/L    Chloride 100 98 - 107 mmol/L    CO2 22.0 22.0 - 29.0 mmol/L    Calcium 8.7 8.6 - 10.5 mg/dL    Total Protein 6.6 6.0 - 8.5 g/dL    Albumin 2.40 (L) 3.50 - 5.20 g/dL    ALT (SGPT) 49 (H) 1 - 41 U/L    AST (SGOT) 42 (H) 1 - 40 U/L    Alkaline Phosphatase 76 39 - 117 U/L    Total Bilirubin 0.8 0.0 - 1.2 mg/dL    eGFR Non African Amer 102 >60 mL/min/1.73    Globulin 4.2 gm/dL    A/G Ratio 0.6 g/dL    BUN/Creatinine Ratio 23.4 7.0 - 25.0    Anion Gap 14.0 5.0 - 15.0 mmol/L   Lipase    Collection Time: 03/04/21  2:15 AM    Specimen: Blood   Result Value Ref Range    Lipase 7 (L) 13 - 60 U/L   Troponin    Collection Time: 03/04/21  2:15 AM    Specimen: Blood   Result Value Ref Range    Troponin T <0.010 0.000 - 0.030 ng/mL   BNP    Collection Time: 03/04/21  2:15 AM    Specimen: Blood   Result Value Ref Range    proBNP 383.8 0.0 - 900.0 pg/mL   CBC Auto Differential    Collection Time: 03/04/21  2:15 AM    Specimen: Blood   Result Value Ref Range    WBC 18.79 (H) 3.40 - 10.80 10*3/mm3    RBC 5.20 4.14 - 5.80 10*6/mm3    Hemoglobin 14.4 13.0 - 17.7 g/dL    Hematocrit 42.3 37.5 - 51.0 %    MCV 81.3 79.0 - 97.0 fL    MCH 27.7 26.6 - 33.0 pg    MCHC 34.0 31.5 - 35.7 g/dL    RDW 15.4 12.3 - 15.4 %    RDW-SD 44.2 37.0 - 54.0 fl    MPV 9.3 6.0 - 12.0 fL    Platelets 369 140 -  450 10*3/mm3    Neutrophil % 87.6 (H) 42.7 - 76.0 %    Lymphocyte % 4.6 (L) 19.6 - 45.3 %    Monocyte % 6.0 5.0 - 12.0 %    Eosinophil % 0.1 (L) 0.3 - 6.2 %    Basophil % 0.4 0.0 - 1.5 %    Immature Grans % 1.3 (H) 0.0 - 0.5 %    Neutrophils, Absolute 16.47 (H) 1.70 - 7.00 10*3/mm3    Lymphocytes, Absolute 0.86 0.70 - 3.10 10*3/mm3    Monocytes, Absolute 1.13 (H) 0.10 - 0.90 10*3/mm3    Eosinophils, Absolute 0.02 0.00 - 0.40 10*3/mm3    Basophils, Absolute 0.07 0.00 - 0.20 10*3/mm3    Immature Grans, Absolute 0.24 (H) 0.00 - 0.05 10*3/mm3    nRBC 0.1 0.0 - 0.2 /100 WBC   D-dimer, Quantitative    Collection Time: 03/04/21  2:17 AM    Specimen: Blood   Result Value Ref Range    D-Dimer, Quantitative 0.72 (H) 0.00 - 0.49 MCGFEU/mL   ECG 12 Lead    Collection Time: 03/04/21  3:27 AM   Result Value Ref Range    QT Interval 382 ms   Lactic Acid, Plasma    Collection Time: 03/04/21  3:32 AM    Specimen: Blood   Result Value Ref Range    Lactate 2.1 (C) 0.5 - 2.0 mmol/L         RADIOLOGY RESULTS  CT Angiogram Chest   Final Result         Electronically signed by Harlan Haskins M.D. on 03-04-21 at 0509      XR Chest 1 View   Final Result         Electronically signed by Harlan Haskins M.D. on 03-04-21 at 0258      XR Spine Lumbar Complete 4+VW   Final Result         Electronically signed by Harlan Haskins M.D. on 03-04-21 at 0312            PROGRESS, DATA ANALYSIS, CONSULTS AND MEDICAL DECISION MAKING  All labs have been independently reviewed by me.  All radiology studies have been reviewed by me and discussed with radiologist dictating the report. Discussion below represents my analysis of pertinent findings related to patient's condition, differential diagnosis, treatment plan and final disposition.     ED Course as of Mar 04 0620   Thu Mar 04, 2021 0219 Patient usually does not use oxygen at home and was hypoxic here in the ER.  Patient's O2 sats dropped to 86% on room air.    [SS]   9944 WBC(!): 18.79 [SS]   7827  "CO2: 22.0 [SS]   0426 Anion Gap: 14.0 [SS]   0430 Lactate(!!): 2.1 [SS]   0445 I rechecked patient informed him of the lab and image results including the pneumonia seen on the CTA chest.  He is currently on 4 L of oxygen and remaining between 89 to 91%.  CO2 and anion gap are normal, do not think I need to order an ABG at this time.    [SS]      ED Course User Index  [SS] Anastacia Alvarado PA-C       The differential diagnosis include but are not limited to COPD exacerbation, pneumonia, COVID-19, pulmonary embolism.       Reviewed pt's history and workup with Dr. Parker.  After a bedside evaluation, Dr. Parker agrees with the plan of care.        (FOR ADMIT) Based on the patient's lab findings and presenting symptoms, the doctor and I feel it is appropriate to admit the patient for further management, evaluation, and treatment.  I have discussed this with the admitting team.  I have also discussed this with the patient/family.  They are in agreement with admission.          Disposition vitals:  /54 (BP Location: Left arm, Patient Position: Sitting)   Pulse 98   Temp 97.1 °F (36.2 °C) (Tympanic)   Resp 20   Ht 170.2 cm (67\")   Wt 86.2 kg (190 lb)   SpO2 90%   BMI 29.76 kg/m²       DIAGNOSIS  Final diagnoses:   Pneumonia due to infectious organism, unspecified laterality, unspecified part of lung   Hypoxia       FOLLOW UP   No follow-up provider specified.       Anastacia Alvarado PA-C  03/04/21 0620    "

## 2021-03-04 NOTE — DISCHARGE PLACEMENT REQUEST
"Elmer Garcia (65 y.o. Male)     Date of Birth Social Security Number Address Home Phone MRN    1955  [facility]  [address]  [place] [address] [phone] [id number]    Nondenominational Marital Status          None        Admission Date Admission Type Admitting Provider Attending Provider Department, Room/Bed    3/4/21 Emergency Malcolm Hillman MD Benton, John B, MD [address], [address]    Discharge Date Discharge Disposition Discharge Destination                       Attending Provider: Malcolm Hillman MD    Allergies: No Known Allergies    Isolation: None   Infection: COVID Screen (preop/placement) (03/04/21)   Code Status: CPR    Ht: 170.2 cm (67\")   Wt: 124 kg (273 lb 13 oz)    Admission Cmt: None   Principal Problem: None                Active Insurance as of 3/4/2021     Primary Coverage     Payor Plan Insurance Group Employer/Plan Group    MEDICARE MEDICARE A ONLY      Payor Plan Address Payor Plan Phone Number Payor Plan Fax Number Effective Dates    [address] [phone]  7/1/2004 - None Entered    [place] [address]       Subscriber Name Subscriber Birth Date Member ID       ELMER GARCIA 1955 [id number]                 Emergency Contacts      (Rel.) Home Phone Work Phone Mobile Phone    Lakshmi Moses (POA) (Power of ) [phone] -- [phone]    Adela Price (Neighbor) [phone] -- [phone]              "

## 2021-03-04 NOTE — ED NOTES
I wore full protective equipment throughout this patient encounter including a face mask, eye shield and gloves. Hand hygiene/washing of hands was performed before donning protective equipment and after removal when leaving the room.       Haylie Solis RN  03/04/21 0513

## 2021-03-04 NOTE — PROGRESS NOTES
Bilateral LE Venous duplex complete.  Preliminary report: Negative for DVT and SVT bilaterally, reported to JASON Gifford.

## 2021-03-04 NOTE — PROGRESS NOTES
Discharge Planning Assessment  [facility]     Patient Name: Garcia Garcia  MRN: [id number]  Today's Date: 3/4/2021    Admit Date: 3/4/2021    Discharge Needs Assessment     Row Name 03/04/21 1508       Living Environment    Lives With  -- currently at [facility]    Current Living Arrangements  home/apartment/condo    Primary Care Provided by  -- currently at [facility] for rehab    Family Caregiver if Needed  child(sussy), adult    Quality of Family Relationships  helpful;involved;supportive    Able to Return to Prior Arrangements  yes       Resource/Environmental Concerns    Resource/Environmental Concerns  none       Transition Planning    Patient/Family Anticipates Transition to  inpatient rehabilitation facility    Patient/Family Anticipated Services at Transition      Transportation Anticipated  family or friend will provide       Discharge Needs Assessment    Readmission Within the Last 30 Days  unable to assess    Equipment Currently Used at Home  cpap;cane, straight;walker, standard;wheelchair, motorized        Discharge Plan     Row Name 03/04/21 1515       Plan    Plan  from [facility] ( skilled bed no bedhold, but can return) - need to confirm dc plan with family    Patient/Family in Agreement with Plan  yes    Plan Comments  Per chart, patient was recently hospitalized at another local [facility] and was dc'd to [facility] for rehab.  Spoke with [facility] and patient is from a skilled bed, no bedhold, but they can accept back at [facility].  Transfer packet started and in [facility] office. Will need to follow up with family to confirm dc plan. Philomena Jarrett RN        Continued Care and Services - Admitted Since 3/4/2021     Destination     Service Provider Request Status Selected Services Address Phone Fax Patient Preferred    [facility]  P[facility] - [facility] N[facility]A [address] [facility] [address] [phone] --                 Demographic Summary     Row Name 03/04/21 1507       General Information    Admission Type  inpatient    Arrived From  emergency department    Required Notices Provided  Important Message from Medicare    Referral Source  admission list    Reason for Consult  discharge planning    Preferred Language  English        Functional Status     Row Name 03/04/21 1508       Functional Status    Usual Activity Tolerance  moderate    Current Activity Tolerance  fair       Functional Status, IADL    Medications  assistive person    Meal Preparation  assistive person    Housekeeping  assistive person    Laundry  assistive person    Shopping  assistive person       Mental Status    General Appearance WDL  WDL       Mental Status Summary    Recent Changes in Mental Status/Cognitive Functioning  no changes    Row Name 03/04/21 1507       Functional Status    Usual Activity Tolerance  good    Current Activity Tolerance  good       Functional Status, IADL    Medications  independent    Meal Preparation  independent    Housekeeping  independent    Laundry  independent    Shopping  independent       Mental Status    General Appearance WDL  WDL       Mental Status Summary    Recent Changes in Mental Status/Cognitive Functioning  no changes        Psychosocial    No documentation.       Abuse/Neglect    No documentation.       Legal    No documentation.       Substance Abuse    No documentation.       Patient Forms    No documentation.           Philomena Jarrett, RN

## 2021-03-05 PROBLEM — E55.9 VITAMIN D DEFICIENCY: Status: ACTIVE | Noted: 2021-03-05

## 2021-03-05 LAB
25(OH)D3 SERPL-MCNC: 12.4 NG/ML (ref 30–100)
ALBUMIN SERPL-MCNC: 2.7 G/DL (ref 3.5–5.2)
ALBUMIN/GLOB SERPL: 0.6 G/DL
ALP SERPL-CCNC: 74 U/L (ref 39–117)
ALT SERPL W P-5'-P-CCNC: 40 U/L (ref 1–41)
ANION GAP SERPL CALCULATED.3IONS-SCNC: 13.5 MMOL/L (ref 5–15)
AST SERPL-CCNC: 29 U/L (ref 1–40)
BASOPHILS # BLD AUTO: 0.01 10*3/MM3 (ref 0–0.2)
BASOPHILS NFR BLD AUTO: 0.1 % (ref 0–1.5)
BILIRUB SERPL-MCNC: 0.4 MG/DL (ref 0–1.2)
BUN SERPL-MCNC: 24 MG/DL (ref 8–23)
BUN/CREAT SERPL: 32 (ref 7–25)
CALCIUM SPEC-SCNC: 9 MG/DL (ref 8.6–10.5)
CHLORIDE SERPL-SCNC: 104 MMOL/L (ref 98–107)
CO2 SERPL-SCNC: 25.5 MMOL/L (ref 22–29)
CREAT SERPL-MCNC: 0.75 MG/DL (ref 0.76–1.27)
DEPRECATED RDW RBC AUTO: 45.5 FL (ref 37–54)
EOSINOPHIL # BLD AUTO: 0 10*3/MM3 (ref 0–0.4)
EOSINOPHIL NFR BLD AUTO: 0 % (ref 0.3–6.2)
ERYTHROCYTE [DISTWIDTH] IN BLOOD BY AUTOMATED COUNT: 15.3 % (ref 12.3–15.4)
GFR SERPL CREATININE-BSD FRML MDRD: 105 ML/MIN/1.73
GLOBULIN UR ELPH-MCNC: 4.2 GM/DL
GLUCOSE BLDC GLUCOMTR-MCNC: 121 MG/DL (ref 70–130)
GLUCOSE BLDC GLUCOMTR-MCNC: 149 MG/DL (ref 70–130)
GLUCOSE BLDC GLUCOMTR-MCNC: 155 MG/DL (ref 70–130)
GLUCOSE BLDC GLUCOMTR-MCNC: 196 MG/DL (ref 70–130)
GLUCOSE SERPL-MCNC: 135 MG/DL (ref 65–99)
HBA1C MFR BLD: 6.14 % (ref 4.8–5.6)
HCT VFR BLD AUTO: 43.4 % (ref 37.5–51)
HGB BLD-MCNC: 14.4 G/DL (ref 13–17.7)
IMM GRANULOCYTES # BLD AUTO: 0.13 10*3/MM3 (ref 0–0.05)
IMM GRANULOCYTES NFR BLD AUTO: 1.1 % (ref 0–0.5)
LYMPHOCYTES # BLD AUTO: 0.6 10*3/MM3 (ref 0.7–3.1)
LYMPHOCYTES NFR BLD AUTO: 5.1 % (ref 19.6–45.3)
MAGNESIUM SERPL-MCNC: 2.2 MG/DL (ref 1.6–2.4)
MCH RBC QN AUTO: 27.4 PG (ref 26.6–33)
MCHC RBC AUTO-ENTMCNC: 33.2 G/DL (ref 31.5–35.7)
MCV RBC AUTO: 82.5 FL (ref 79–97)
MONOCYTES # BLD AUTO: 0.39 10*3/MM3 (ref 0.1–0.9)
MONOCYTES NFR BLD AUTO: 3.3 % (ref 5–12)
NEUTROPHILS NFR BLD AUTO: 10.66 10*3/MM3 (ref 1.7–7)
NEUTROPHILS NFR BLD AUTO: 90.4 % (ref 42.7–76)
NRBC BLD AUTO-RTO: 0 /100 WBC (ref 0–0.2)
PLATELET # BLD AUTO: 345 10*3/MM3 (ref 140–450)
PMV BLD AUTO: 9.4 FL (ref 6–12)
POTASSIUM SERPL-SCNC: 3.9 MMOL/L (ref 3.5–5.2)
PROCALCITONIN SERPL-MCNC: 0.1 NG/ML (ref 0–0.25)
PROT SERPL-MCNC: 6.9 G/DL (ref 6–8.5)
RBC # BLD AUTO: 5.26 10*6/MM3 (ref 4.14–5.8)
SODIUM SERPL-SCNC: 143 MMOL/L (ref 136–145)
WBC # BLD AUTO: 11.79 10*3/MM3 (ref 3.4–10.8)

## 2021-03-05 PROCEDURE — 97530 THERAPEUTIC ACTIVITIES: CPT

## 2021-03-05 PROCEDURE — 25010000002 PIPERACILLIN SOD-TAZOBACTAM PER 1 G: Performed by: HOSPITALIST

## 2021-03-05 PROCEDURE — 97162 PT EVAL MOD COMPLEX 30 MIN: CPT

## 2021-03-05 PROCEDURE — 94618 PULMONARY STRESS TESTING: CPT

## 2021-03-05 PROCEDURE — 94799 UNLISTED PULMONARY SVC/PX: CPT

## 2021-03-05 PROCEDURE — 82962 GLUCOSE BLOOD TEST: CPT

## 2021-03-05 PROCEDURE — 80053 COMPREHEN METABOLIC PANEL: CPT | Performed by: HOSPITALIST

## 2021-03-05 PROCEDURE — 83036 HEMOGLOBIN GLYCOSYLATED A1C: CPT | Performed by: HOSPITALIST

## 2021-03-05 PROCEDURE — 94762 N-INVAS EAR/PLS OXIMTRY CONT: CPT

## 2021-03-05 PROCEDURE — 92610 EVALUATE SWALLOWING FUNCTION: CPT

## 2021-03-05 PROCEDURE — 97166 OT EVAL MOD COMPLEX 45 MIN: CPT

## 2021-03-05 PROCEDURE — 97535 SELF CARE MNGMENT TRAINING: CPT

## 2021-03-05 PROCEDURE — 82306 VITAMIN D 25 HYDROXY: CPT | Performed by: HOSPITALIST

## 2021-03-05 PROCEDURE — 85025 COMPLETE CBC W/AUTO DIFF WBC: CPT | Performed by: HOSPITALIST

## 2021-03-05 PROCEDURE — 94760 N-INVAS EAR/PLS OXIMETRY 1: CPT

## 2021-03-05 PROCEDURE — 25010000002 ENOXAPARIN PER 10 MG: Performed by: HOSPITALIST

## 2021-03-05 PROCEDURE — 25010000002 METHYLPREDNISOLONE PER 40 MG: Performed by: INTERNAL MEDICINE

## 2021-03-05 PROCEDURE — 83735 ASSAY OF MAGNESIUM: CPT | Performed by: HOSPITALIST

## 2021-03-05 PROCEDURE — 84145 PROCALCITONIN (PCT): CPT | Performed by: HOSPITALIST

## 2021-03-05 PROCEDURE — 25010000002 VANCOMYCIN 10 G RECONSTITUTED SOLUTION: Performed by: HOSPITALIST

## 2021-03-05 RX ORDER — PREDNISONE 20 MG/1
40 TABLET ORAL
Status: DISCONTINUED | OUTPATIENT
Start: 2021-03-06 | End: 2021-03-11 | Stop reason: HOSPADM

## 2021-03-05 RX ORDER — LEVOTHYROXINE SODIUM 0.03 MG/1
25 TABLET ORAL DAILY
Status: DISCONTINUED | OUTPATIENT
Start: 2021-03-05 | End: 2021-03-05

## 2021-03-05 RX ORDER — ATORVASTATIN CALCIUM 20 MG/1
20 TABLET, FILM COATED ORAL NIGHTLY
Status: DISCONTINUED | OUTPATIENT
Start: 2021-03-05 | End: 2021-03-11 | Stop reason: HOSPADM

## 2021-03-05 RX ORDER — CLOPIDOGREL BISULFATE 75 MG/1
75 TABLET ORAL DAILY
Status: DISCONTINUED | OUTPATIENT
Start: 2021-03-05 | End: 2021-03-11 | Stop reason: HOSPADM

## 2021-03-05 RX ORDER — ALLOPURINOL 100 MG/1
200 TABLET ORAL DAILY
Status: DISCONTINUED | OUTPATIENT
Start: 2021-03-05 | End: 2021-03-11 | Stop reason: HOSPADM

## 2021-03-05 RX ORDER — LEVOTHYROXINE SODIUM 0.03 MG/1
25 TABLET ORAL
Status: DISCONTINUED | OUTPATIENT
Start: 2021-03-06 | End: 2021-03-11 | Stop reason: HOSPADM

## 2021-03-05 RX ORDER — ERGOCALCIFEROL 1.25 MG/1
50000 CAPSULE ORAL
Status: DISCONTINUED | OUTPATIENT
Start: 2021-03-05 | End: 2021-03-11 | Stop reason: HOSPADM

## 2021-03-05 RX ORDER — BUPROPION HYDROCHLORIDE 150 MG/1
450 TABLET, EXTENDED RELEASE ORAL DAILY
Status: DISCONTINUED | OUTPATIENT
Start: 2021-03-05 | End: 2021-03-11 | Stop reason: HOSPADM

## 2021-03-05 RX ORDER — ALPRAZOLAM 0.5 MG/1
0.5 TABLET ORAL 2 TIMES DAILY PRN
Status: DISCONTINUED | OUTPATIENT
Start: 2021-03-05 | End: 2021-03-11 | Stop reason: HOSPADM

## 2021-03-05 RX ORDER — MONTELUKAST SODIUM 10 MG/1
10 TABLET ORAL NIGHTLY
Status: DISCONTINUED | OUTPATIENT
Start: 2021-03-05 | End: 2021-03-11 | Stop reason: HOSPADM

## 2021-03-05 RX ORDER — PANTOPRAZOLE SODIUM 40 MG/1
40 TABLET, DELAYED RELEASE ORAL DAILY
Status: DISCONTINUED | OUTPATIENT
Start: 2021-03-05 | End: 2021-03-11 | Stop reason: HOSPADM

## 2021-03-05 RX ORDER — ASPIRIN 81 MG/1
81 TABLET ORAL DAILY
Status: DISCONTINUED | OUTPATIENT
Start: 2021-03-05 | End: 2021-03-11 | Stop reason: HOSPADM

## 2021-03-05 RX ORDER — LISINOPRIL 2.5 MG/1
2.5 TABLET ORAL DAILY
Status: DISCONTINUED | OUTPATIENT
Start: 2021-03-05 | End: 2021-03-11 | Stop reason: HOSPADM

## 2021-03-05 RX ORDER — BUSPIRONE HYDROCHLORIDE 15 MG/1
15 TABLET ORAL 3 TIMES DAILY
Status: DISCONTINUED | OUTPATIENT
Start: 2021-03-05 | End: 2021-03-11 | Stop reason: HOSPADM

## 2021-03-05 RX ORDER — BUDESONIDE AND FORMOTEROL FUMARATE DIHYDRATE 80; 4.5 UG/1; UG/1
2 AEROSOL RESPIRATORY (INHALATION)
Status: DISCONTINUED | OUTPATIENT
Start: 2021-03-05 | End: 2021-03-11 | Stop reason: HOSPADM

## 2021-03-05 RX ORDER — DIVALPROEX SODIUM 250 MG/1
250 TABLET, DELAYED RELEASE ORAL 2 TIMES DAILY
Status: DISCONTINUED | OUTPATIENT
Start: 2021-03-05 | End: 2021-03-11 | Stop reason: HOSPADM

## 2021-03-05 RX ORDER — METOPROLOL TARTRATE 50 MG/1
50 TABLET, FILM COATED ORAL 2 TIMES DAILY
Status: DISCONTINUED | OUTPATIENT
Start: 2021-03-05 | End: 2021-03-11 | Stop reason: HOSPADM

## 2021-03-05 RX ADMIN — LEVOTHYROXINE SODIUM ANHYDROUS 18.75 MCG: 100 INJECTION, POWDER, LYOPHILIZED, FOR SOLUTION INTRAVENOUS at 08:00

## 2021-03-05 RX ADMIN — PANTOPRAZOLE SODIUM 40 MG: 40 TABLET, DELAYED RELEASE ORAL at 16:25

## 2021-03-05 RX ADMIN — ATORVASTATIN CALCIUM 20 MG: 20 TABLET, FILM COATED ORAL at 21:35

## 2021-03-05 RX ADMIN — ENOXAPARIN SODIUM 40 MG: 40 INJECTION SUBCUTANEOUS at 21:34

## 2021-03-05 RX ADMIN — METHYLPREDNISOLONE SODIUM SUCCINATE 40 MG: 40 INJECTION, POWDER, FOR SOLUTION INTRAMUSCULAR; INTRAVENOUS at 07:50

## 2021-03-05 RX ADMIN — ASPIRIN 81 MG: 81 TABLET, COATED ORAL at 16:25

## 2021-03-05 RX ADMIN — LISINOPRIL 2.5 MG: 2.5 TABLET ORAL at 16:25

## 2021-03-05 RX ADMIN — ERGOCALCIFEROL 50000 UNITS: 1.25 CAPSULE ORAL at 16:25

## 2021-03-05 RX ADMIN — BUSPIRONE HYDROCHLORIDE 15 MG: 15 TABLET ORAL at 16:25

## 2021-03-05 RX ADMIN — BUSPIRONE HYDROCHLORIDE 15 MG: 15 TABLET ORAL at 21:35

## 2021-03-05 RX ADMIN — IPRATROPIUM BROMIDE AND ALBUTEROL SULFATE 3 ML: 2.5; .5 SOLUTION RESPIRATORY (INHALATION) at 11:25

## 2021-03-05 RX ADMIN — VANCOMYCIN HYDROCHLORIDE 1250 MG: 10 INJECTION, POWDER, LYOPHILIZED, FOR SOLUTION INTRAVENOUS at 06:10

## 2021-03-05 RX ADMIN — MONTELUKAST SODIUM 10 MG: 10 TABLET, FILM COATED ORAL at 21:35

## 2021-03-05 RX ADMIN — ENOXAPARIN SODIUM 40 MG: 40 INJECTION SUBCUTANEOUS at 08:00

## 2021-03-05 RX ADMIN — NYSTATIN: 100000 POWDER TOPICAL at 21:35

## 2021-03-05 RX ADMIN — CLOPIDOGREL 75 MG: 75 TABLET, FILM COATED ORAL at 16:25

## 2021-03-05 RX ADMIN — IPRATROPIUM BROMIDE AND ALBUTEROL SULFATE 3 ML: 2.5; .5 SOLUTION RESPIRATORY (INHALATION) at 20:12

## 2021-03-05 RX ADMIN — TAZOBACTAM SODIUM AND PIPERACILLIN SODIUM 3.38 G: 375; 3 INJECTION, SOLUTION INTRAVENOUS at 21:34

## 2021-03-05 RX ADMIN — DIVALPROEX SODIUM 250 MG: 250 TABLET, DELAYED RELEASE ORAL at 21:35

## 2021-03-05 RX ADMIN — ALLOPURINOL 200 MG: 100 TABLET ORAL at 16:25

## 2021-03-05 RX ADMIN — METOPROLOL TARTRATE 50 MG: 50 TABLET, FILM COATED ORAL at 21:35

## 2021-03-05 RX ADMIN — NYSTATIN: 100000 POWDER TOPICAL at 08:00

## 2021-03-05 RX ADMIN — BUPROPION HYDROCHLORIDE 450 MG: 150 TABLET, EXTENDED RELEASE ORAL at 16:25

## 2021-03-05 RX ADMIN — TAZOBACTAM SODIUM AND PIPERACILLIN SODIUM 3.38 G: 375; 3 INJECTION, SOLUTION INTRAVENOUS at 07:50

## 2021-03-05 RX ADMIN — TAZOBACTAM SODIUM AND PIPERACILLIN SODIUM 3.38 G: 375; 3 INJECTION, SOLUTION INTRAVENOUS at 13:38

## 2021-03-05 RX ADMIN — METOPROLOL TARTRATE 5 MG: 1 INJECTION, SOLUTION INTRAVENOUS at 11:26

## 2021-03-05 RX ADMIN — IPRATROPIUM BROMIDE AND ALBUTEROL SULFATE 3 ML: 2.5; .5 SOLUTION RESPIRATORY (INHALATION) at 16:52

## 2021-03-05 RX ADMIN — Medication 1 APPLICATION: at 21:35

## 2021-03-05 RX ADMIN — IPRATROPIUM BROMIDE AND ALBUTEROL SULFATE 3 ML: 2.5; .5 SOLUTION RESPIRATORY (INHALATION) at 08:21

## 2021-03-05 RX ADMIN — Medication 1 APPLICATION: at 08:00

## 2021-03-05 NOTE — NURSING NOTE
Difficulty keeping bipap mask in place this shift, sats sustain 100%. Removed mask per RT ok and placed on HFNC. Now weaned to 5L to maintain > 94% and wctm

## 2021-03-05 NOTE — PLAN OF CARE
Pt is a 65 y.o. with a history of dementia, hypertension, hyperlipidemia, CVA with L sided weakness, DM2, MIZTI and GERD that presents to [facility] from nursing home due to shortness of breath. Admitted for management of hypoxic respiratory failure, metabolic encephalopathy. Pt states he lives with his grandson in [facility], reports PLOF as independent with use of FWW, however pt was admitted from subacute rehab.  Pt oriented x 2 during today's evaluation (significant improvement from yesterday's medical status). Pt required Emily for all mobility however with severe decreased activity tolerance and symptomatic orthostatic hypotension with standing. Pt dizzy and light headed upon standing with FWW with tolerance less than 45-60sec.  Required Emily x 2 for stand pivot to recliner. Skilled PT needed to address above impairments. DC recs include return to SNU/[facility]IVAL.    .Patient was intermittently wearing a face mask during this therapy encounter. Therapist used appropriate personal protective equipment including eye protection, mask, and gloves.  Mask used was standard procedure mask. Appropriate PPE was worn during the entire therapy session. Hand hygiene was completed before and after therapy session. Patient is not in enhanced droplet precautions.    Problem: Adult Inpatient Plan of Care  Goal: Plan of Care Review  Outcome: Ongoing, Progressing  Flowsheets (Taken 3/5/2021 1302 by Ileana Richard MS CCC-SLP)  Plan of Care Reviewed With: patient   Goal Outcome Evaluation:

## 2021-03-05 NOTE — PROGRESS NOTES
Name: Garcia Garcia ADMIT: 3/4/2021   : 1955  PCP: Vargas Ling MD    MRN: [id number] LOS: 1 days   AGE/SEX: 65 y.o. male  ROOM: [facility]     Subjective   Subjective   Feeling better today. Awake and alert. Eating and drinking well. Mild cough. No SOA at rest. A little tired, but otherwise doing very well.    Review of Systems   Constitutional: Positive for fatigue. Negative for appetite change, chills, diaphoresis and fever.   HENT: Negative for congestion, ear pain, mouth sores, rhinorrhea, sore throat, trouble swallowing and voice change.    Eyes: Negative for pain and visual disturbance.   Respiratory: Positive for cough. Negative for choking, chest tightness, shortness of breath, wheezing and stridor.    Cardiovascular: Negative for chest pain, palpitations and leg swelling.   Gastrointestinal: Negative for abdominal pain, blood in stool, diarrhea, nausea and vomiting.   Endocrine: Negative for cold intolerance and heat intolerance.   Genitourinary: Negative for decreased urine volume, difficulty urinating, dysuria and hematuria.   Musculoskeletal: Negative for back pain and neck pain.   Skin: Positive for rash. Negative for color change and pallor.   Allergic/Immunologic: Negative for environmental allergies and food allergies.   Neurological: Negative for tremors, seizures, syncope, speech difficulty and headaches.   Hematological: Negative for adenopathy. Does not bruise/bleed easily.   Psychiatric/Behavioral: Negative for behavioral problems, confusion and hallucinations.        Objective   Objective   Vital Signs  Temp:  [97.6 °F (36.4 °C)-98.2 °F (36.8 °C)] 97.6 °F (36.4 °C)  Heart Rate:  [] 98  Resp:  [15-34] 18  BP: ()/(74-87) 142/87  SpO2:  [86 %-100 %] 90 %  on  Flow (L/min):  [3-35] 3;   Device (Oxygen Therapy): high-flow nasal cannula  Body mass index is 42.39 kg/m².  Physical Exam  Vitals signs and nursing note reviewed.   Constitutional:       General: He is not in  acute distress.     Appearance: Normal appearance. He is obese. He is ill-appearing (chronically). He is not toxic-appearing or diaphoretic.   HENT:      Head: Normocephalic and atraumatic.      Right Ear: External ear normal.      Left Ear: External ear normal.      Nose: Nose normal.      Mouth/Throat:      Mouth: Mucous membranes are moist.      Pharynx: Oropharynx is clear.   Eyes:      General: No scleral icterus.        Right eye: No discharge.         Left eye: No discharge.      Conjunctiva/sclera: Conjunctivae normal.   Neck:      Musculoskeletal: Neck supple. No neck rigidity.   Cardiovascular:      Rate and Rhythm: Normal rate and regular rhythm.      Pulses: Normal pulses.   Pulmonary:      Effort: Pulmonary effort is normal. No respiratory distress.      Breath sounds: Normal breath sounds.   Abdominal:      General: Bowel sounds are normal. There is no distension.      Palpations: Abdomen is soft.      Tenderness: There is no abdominal tenderness.   Musculoskeletal: Normal range of motion.         General: No swelling or deformity.   Lymphadenopathy:      Cervical: No cervical adenopathy.   Skin:     General: Skin is warm and dry.      Capillary Refill: Capillary refill takes less than 2 seconds.      Coloration: Skin is not jaundiced.      Comments: Scattered areas of what appear to be neurotic excoriation   Neurological:      Mental Status: He is alert. Mental status is at baseline.      Cranial Nerves: No cranial nerve deficit.      Coordination: Coordination normal.   Psychiatric:         Mood and Affect: Mood normal.         Behavior: Behavior normal.         Thought Content: Thought content normal.         Results Review     I reviewed the patient's new clinical results.  Results from last 7 days   Lab Units 03/05/21  0659 03/04/21 0215   WBC 10*3/mm3 11.79* 18.79*   HEMOGLOBIN g/dL 14.4 14.4   PLATELETS 10*3/mm3 345 369     Results from last 7 days   Lab Units 03/05/21  0659 03/04/21 0215    SODIUM mmol/L 143 136   POTASSIUM mmol/L 3.9 3.5   CHLORIDE mmol/L 104 100   CO2 mmol/L 25.5 22.0   BUN mg/dL 24* 18   CREATININE mg/dL 0.75* 0.77   GLUCOSE mg/dL 135* 136*   Estimated Creatinine Clearance: 126.8 mL/min (A) (by C-G formula based on SCr of 0.75 mg/dL (L)).  Results from last 7 days   Lab Units 03/05/21  0659 03/04/21  0215   ALBUMIN g/dL 2.70* 2.40*   BILIRUBIN mg/dL 0.4 0.8   ALK PHOS U/L 74 76   AST (SGOT) U/L 29 42*   ALT (SGPT) U/L 40 49*     Results from last 7 days   Lab Units 03/05/21  0659 03/04/21  0215   CALCIUM mg/dL 9.0 8.7   ALBUMIN g/dL 2.70* 2.40*   MAGNESIUM mg/dL 2.2  --      Results from last 7 days   Lab Units 03/05/21  0659 03/04/21  0807 03/04/21  0332 03/04/21  0215   PROCALCITONIN ng/mL 0.10  --   --  0.17   LACTATE mmol/L  --  1.5 2.1*  --      COVID19   Date Value Ref Range Status   03/04/2021 Not Detected Not Detected - Ref. Range Final   03/04/2021 Not Detected Not Detected - Ref. Range Final     Hemoglobin A1C   Date/Time Value Ref Range Status   03/05/2021 0659 6.14 (H) 4.80 - 5.60 % Final     Glucose   Date/Time Value Ref Range Status   03/05/2021 1106 149 (H) 70 - 130 mg/dL Final   03/05/2021 0605 121 70 - 130 mg/dL Final   03/04/2021 2035 116 70 - 130 mg/dL Final   03/04/2021 1613 116 70 - 130 mg/dL Final   03/04/2021 1139 108 70 - 130 mg/dL Final       Duplex Venous Lower Extremity - Bilateral CAR  · Normal bilateral lower extremity venous duplex scan.     XR Chest 1 View  ONE VIEW PORTABLE CHEST AT 2:31 PM     HISTORY: Shortness of breath. Hypoxia.     FINDINGS: The lungs are moderately expanded with prominent haziness  throughout the right lung and at the left base showing marked worsening  from 12 hours ago and most consistent with areas of pneumonia. The  patient had a CT scan performed 11 hours ago showing groundglass opacity  throughout the right lung and scattered in the left lung and raising the  concern of Covid 19 pneumonia. Clinical correlation is  therefore  recommended. The heart remains borderline enlarged.     This report was finalized on 3/4/2021 3:04 PM by Dr. Josesito Goldberg M.D.     CT Angiogram Chest  Narrative: Patient: ELMER ROSALES  Time Out: 05:09  Exam(s): CTA CHEST     EXAM:    CT Angiography Chest With Intravenous Contrast    CLINICAL HISTORY:     Reason for exam: r o PE.    TECHNIQUE:    Axial computed tomographic angiography images of the chest with   intravenous contrast.  CTDI is 14.80 mGy and DLP is 613.80 mGy-cm.  This   CT exam was performed according to the principle of ALARA (As Low As   Reasonably Achievable) by using one or more of the following dose   reduction techniques: automated exposure control, adjustment of the mA   and or kV according to patient size, and or use of iterative   reconstruction technique.    MIP reconstructed images were created and reviewed.    COMPARISON:    No relevant prior studies available.    FINDINGS:    Pulmonary arteries:  Unremarkable.  No pulmonary embolism.    Aorta:  No acute findings.  No thoracic aortic aneurysm.    Lungs: Calcified granulomas with calcified subcarinal right hilar lymph   nodes compatible with prior granulomatous disease.  No mass.  Ground-  glass opacification of the right lung left apex with scattered ground-  glass opacification of the superior aspect of the left lower lobe..    Pleural space:  Unremarkable.  No significant effusion.  No   pneumothorax.    Heart:  Unremarkable.  No cardiomegaly.  No significant pericardial   effusion.  No evidence of RV dysfunction.    Bones joints:  No acute fracture.  No dislocation.    Soft tissues:  Unremarkable.    Lymph nodes:  Unremarkable.  No enlarged lymph nodes.    IMPRESSION:       1. No acute pulmonary embolism.    2. Diffuse ground-glass opacification of the right lung with patchy areas   of glass opacification of the superior left upper lobe and superior   aspect of the left lower lobe which may be due to infection,  inhalational   injury, or drug-induced pneumonitis.  Impression: Electronically signed by Harlan Haskins M.D. on 03-04-21 at 0509  XR Spine Lumbar Complete 4+VW  Narrative: Patient: ELMER ROSALES  Time Out: 03:12  Exam(s): FILM L SPINE      EXAM:    XR Lumbosacral Spine, 2 or 3 Views    CLINICAL HISTORY:     Reason for exam: low back pain.    TECHNIQUE:    Frontal and lateral views of the lumbar spine and sacrum.    COMPARISON:    No relevant prior studies available.    FINDINGS:    Vertebrae:  Unremarkable.  No acute fracture.  Normal alignment.    Sacrum coccyx:  Unremarkable as visualized.  No acute fracture.    Disc spaces:  No acute findings.  No significant narrowing.  Small   multilevel osteophytes throughout the thoracic spine.    Soft tissues:  Unremarkable.  Heart vascular calcifications.    IMPRESSION:       No acute fractures or spondylolisthesis of the lumbar spine.  Impression: Electronically signed by Harlan Haskins M.D. on 03-04-21 at 0312  XR Chest 1 View  Narrative: Patient: ELMER ROSALES  Time Out: 02:58  Exam(s): FILM CXR 1 VIEW     EXAM:    XR Chest, 1 View    CLINICAL HISTORY:     Reason for exam: SOA.    TECHNIQUE:    Frontal view of the chest.    COMPARISON:    No relevant prior studies available.    FINDINGS:    Lungs: Low lung volumes and mild perihilar and right upper lobe   reticular opacities.  No consolidation.    Pleural space:  No pleural effusion.  No pneumothorax.    Heart:  Unremarkable.  No cardiomegaly.    Mediastinum:  Unremarkable.    Bones joints:  Unremarkable.    IMPRESSION:       Low lung volumes and mild perihilar and right upper lobe reticular   opacities which may be due to edema or infection.   Impression: Electronically signed by Harlan Haskins M.D. on 03-04-21 at 0258    Scheduled Medications  enoxaparin, 40 mg, Subcutaneous, Q12H  hydrocortisone-bacitracin-zinc oxide-nystatin, 1 application, Topical, BID  ipratropium-albuterol, 3 mL, Nebulization, 4x Daily -  RT  Levothyroxine Sodium, 18.75 mcg, Intravenous, Daily  methylPREDNISolone sodium succinate, 40 mg, Intravenous, Q8H  metoprolol tartrate, 5 mg, Intravenous, Q6H  nystatin, , Topical, Q12H  piperacillin-tazobactam, 3.375 g, Intravenous, Q8H  vitamin D, 50,000 Units, Oral, Q7 Days    Infusions  Pharmacy to dose vancomycin,     Diet  Diet Regular; Cardiac, Consistent Carbohydrate       Assessment/Plan     Active Hospital Problems    Diagnosis  POA   • **Pneumonia due to infectious organism [J18.9]  Yes   • Vitamin D deficiency [E55.9]  Yes   • MITZI (obstructive sleep apnea) [G47.33]  Yes   • Severe depression (CMS/HCC) [F32.2]  Yes   • HLD (hyperlipidemia) [E78.5]  Yes   • HTN (hypertension) [I10]  Yes   • History of stroke [Z86.73]  Not Applicable   • GERD without esophagitis [K21.9]  Yes   • Diastolic CHF, chronic (CMS/HCC) [I50.32]  Yes   • COPD (chronic obstructive pulmonary disease) (CMS/HCC) [J44.9]  Yes   • Dementia (CMS/HCC) [F03.90]  Yes   • Lactic acidosis [E87.2]  Yes   • Leukocytosis [D72.829]  Yes   • Elevated d-dimer [R79.89]  Yes   • Type 2 diabetes mellitus with diabetic neuropathy, unspecified (CMS/HCC) [E11.40]  Yes   • Acute respiratory failure with hypoxia (CMS/HCC) [J96.01]  Yes      Resolved Hospital Problems   No resolved problems to display.       64yo gentleman sent from NH with SOA and hypoxia. Suffers from dementia and likely some metabolic encephalopathy on top of that due to infection. Admitted here with concern for recurrent PNA. Was just admitted to another facility for PNA with sepsis.     Suspect recurrent bacterial PNA with acute hypoxic resp failure, infiltrates on CT, and elevated WBC, aspiration PNA is concern  Continue IV Zosyn, dc Vanc as MRSA screen negative  Cultures all neg so far, COViD neg  WBC down significantly today  Afebrile, no hypotension or tachycardia  IS and OPEP ordered  Marked worsening of resp failure late yesterday, but much improved this AM, was up to 10L/min  highflow and was on NIPPV overnight, but O2 pretty stable today on only 3L/min, received one dose IV Lasix per Pulm last night.  IV Solumedrol added for smoking/COPD history  Appreciate Pulm attention to pt  SLP eval today very reassuring (now that he is more awake/alert), regular diet in place with swallow precautions, will restart home meds, lower dose of benzo and depakote  DDimer elevated, no PE on CTA, venous u/s of legs neg for DVT  A1c only 6.14, continue to monitor accuchecks, sugars okay today despite steroids  Vitamin D very low, will start po replacement  Has h/o CVA with residual non-dominant left hemiparesis, at baseline      · Lovenox 40mg BID for DVT prophylaxis.  · Full code.  · Discussed with patient and nursing staff.  · Anticipate discharge back to SNU facility, PT onel noted,  timing yet to be determined.      Malcolm Hillman MD  [facility]  03/05/21  14:00 EST

## 2021-03-05 NOTE — PLAN OF CARE
Patient improving. On 3L NC throughout the shift. ABX given as ordered. PO meds restarted, Regular, CC ,heart health diet started after speech eval today. Patient had lubin out at 1000 today. 200ml in lubin when discarded (Charted). Patient has only voided 125 since lubin removed (Charted). Bladder scanner reading 0ml at this time. Dr. Rafy bartlett. He advises to continue to encourage the patient to void on his own. If he has not voided on his own by 0000 tonight, night is to bladder scan again, and straight cath the patient and record appropriately. Nursing communication placed. Patient has no complaints of fullness, pain, or the urge to void. VSS, no new complaints, will continue to monitor.             Goal Outcome Evaluation:     Progress: improving  Problem: Adult Inpatient Plan of Care  Goal: Plan of Care Review  Outcome: Ongoing, Progressing  Flowsheets  Taken 3/5/2021 1806 by Ирина Ervin, RN  Progress: improving  Taken 3/5/2021 1455 by Fior Gonzalez OT  Plan of Care Reviewed With: (daughter-in-law present)   patient   family

## 2021-03-05 NOTE — THERAPY EVALUATION
Acute Care - Speech Language Pathology   Swallow Initial Evaluation [facility]     Patient Name: Garcia Garcia  : 1955  MRN: [id number]  Today's Date: 3/5/2021               Admit Date: 3/4/2021    Visit Dx:     ICD-10-CM ICD-9-CM   1. Pneumonia due to infectious organism, unspecified laterality, unspecified part of lung  J18.9 486   2. Hypoxia  R09.02 799.02     Patient Active Problem List   Diagnosis   • Pneumonia due to infectious organism   • MITZI (obstructive sleep apnea)   • Severe depression (CMS/[facility])   • HLD (hyperlipidemia)   • HTN (hypertension)   • History of stroke   • GERD without esophagitis   • Diastolic CHF, chronic (CMS/[facility])   • COPD (chronic obstructive pulmonary disease) (CMS/[facility])   • Dementia (CMS/[facility])   • Lactic acidosis   • Leukocytosis   • Elevated d-dimer   • Type 2 diabetes mellitus with diabetic neuropathy, unspecified (CMS/[facility])   • Acute respiratory failure with hypoxia (CMS/[facility])     Past Medical History:   Diagnosis Date   • Acidosis    • Arthritis    • Asthma    • Atherosclerotic heart disease of native coronary artery without angina pectoris    • CHF (congestive heart failure) (CMS/[facility])    • Chronic obstructive pulmonary disease, unspecified (CMS/[facility])    • Cognitive communication deficit    • Cyst of kidney, acquired    • Elevated white blood cell count, unspecified    • Essential (primary) hypertension    • GERD (gastroesophageal reflux disease)    • Hemiplegia and hemiparesis following cerebral infarction affecting left non-dominant side (CMS/[facility])    • Hyperlipidemia, unspecified    • Major depressive disorder, recurrent, severe with psychotic symptoms (CMS/[facility])    • Muscle weakness (generalized)    • Other specified metabolic disorders (CMS/[facility])    • Secondary polycythemia    • Sepsis due to Streptococcus pneumoniae (CMS/[facility])    • Sleep apnea, unspecified    • Tachycardia, unspecified    • Toxic encephalopathy    • Type 2 diabetes mellitus with diabetic neuropathy,  unspecified (CMS/[facility])    • Type 2 diabetes mellitus with hypoglycemia without coma (CMS/[facility])    • Unspecified dementia with behavioral disturbance (CMS/[facility])      Past Surgical History:   Procedure Laterality Date   • KNEE SURGERY       Patient was not wearing a face mask during this therapy encounter. Therapist used appropriate personal protective equipment including mask, eye protection and gloves.  Mask used was standard procedure mask. Appropriate PPE was worn during the entire therapy session. Hand hygiene was completed before and after therapy session. Patient is not in enhanced droplet precautions.        SWALLOW EVALUATION (last 72 hours)      SLP Adult Swallow Evaluation     Row Name 03/05/21 1200                   Rehab Evaluation    Document Type  evaluation  -AW        Subjective Information  no complaints  -AW        Patient Observations  alert;cooperative;agree to therapy  -AW        Patient/Family/Caregiver Comments/Observations  Pt up in chair, anxious to eat.  -AW        Care Plan Review  evaluation/treatment results reviewed;patient/other agree to care plan  -AW        Patient Effort  good  -AW        Symptoms Noted During/After Treatment  none  -AW           General Information    Patient Profile Reviewed  yes  -AW        Pertinent History Of Current Problem  Pt admitted from NH with respiratory failure, AMS, and B PNA; h/o COPD, tested negative for Covid.  -AW        Current Method of Nutrition  NPO  -AW        Precautions/Limitations, Vision  WFL;for purposes of eval  -AW        Precautions/Limitations, Hearing  WFL;for purposes of eval  -AW        Prior Level of Function-Communication  WFL  -AW        Prior Level of Function-Swallowing  no diet consistency restrictions  -AW        Plans/Goals Discussed with  patient;agreed upon  -AW        Barriers to Rehab  none identified  -AW        Patient's Goals for Discharge  return to PO diet  -AW           Pain    Additional Documentation  Pain Scale:  Numbers Pre/Post-Treatment (Group)  -AW           Pain Scale: Numbers Pre/Post-Treatment    Pretreatment Pain Rating  0/10 - no pain  -AW        Posttreatment Pain Rating  0/10 - no pain  -AW           Oral Motor Structure and Function    Dentition Assessment  missing teeth  -AW        Secretion Management  WNL/WFL  -AW        Mucosal Quality  moist, healthy  -AW        Volitional Swallow  WFL  -AW           Oral Musculature and Cranial Nerve Assessment    Oral Motor General Assessment  WFL  -AW           General Eating/Swallowing Observations    Respiratory Support Currently in Use  high-flow nasal cannula  -AW        Eating/Swallowing Skills  self-fed;fed by SLP  -AW        Positioning During Eating  upright in chair  -AW        Utensils Used  spoon;cup;straw  -AW        Consistencies Trialed  regular textures;soft textures;mixed consistency;pureed;thin liquids;ice chips  -AW        Pre SpO2 (%)  90  -AW        Post SpO2 (%)  92  -AW           Clinical Swallow Eval    Oral Prep Phase  WFL  -AW        Oral Transit  WFL  -AW        Oral Residue  WFL  -AW        Pharyngeal Phase  no overt signs/symptoms of pharyngeal impairment  -AW        Clinical Swallow Evaluation Summary  Clinical swallow eval completed. Pt tolerated thins via cup and straw with no s/s. Mastication functional for solids with no s/s w/ soft, mixed, or regular solids. Oral cavity clear post swallow. Vocal quality clear during eval. Laryngeal elevation was adequate with palpation. Recommend regular diet with thin; meds as tolerated; upright for meals and 30 min after; slow rate; small bites/sips. Discussed energy conservation during meals. ST to follow. Monitor for s/s of aspiration and make NPO if s/s occur.   -AW           Clinical Impression    SLP Swallowing Diagnosis  R/O pharyngeal dysphagia  -AW        Functional Impact  risk of aspiration/pneumonia  -AW        Rehab Potential/Prognosis, Swallowing  good, to achieve stated therapy goals  -AW         Swallow Criteria for Skilled Therapeutic Interventions Met  demonstrates skilled criteria  -AW           Recommendations    Therapy Frequency (Swallow)  PRN  -AW        Predicted Duration Therapy Intervention (Days)  until discharge  -AW        SLP Diet Recommendation  regular textures;thin liquids  -AW        Recommended Precautions and Strategies  small bites of food and sips of liquid;upright posture during/after eating  -AW        Oral Care Recommendations  Oral Care BID/PRN  -AW        SLP Rec. for Method of Medication Administration  meds whole;with thin liquids;with pudding or applesauce;as tolerated  -AW        Monitor for Signs of Aspiration  yes;notify SLP if any concerns  -AW        Anticipated Discharge Disposition (SLP)  skilled nursing facility  -AW           Swallow Goals (SLP)    Oral Nutrition/Hydration Goal Selection (SLP)  oral nutrition/hydration, SLP goal 1  -AW           Oral Nutrition/Hydration Goal 1 (SLP)    Oral Nutrition/Hydration Goal 1, SLP  Pt will safely tolerate the least restrictive diet with no s/s of aspiration.  -AW        Time Frame (Oral Nutrition/Hydration Goal 1, SLP)  by discharge  -AW          User Key  (r) = Recorded By, (t) = Taken By, (c) = Cosigned By    Initials Name Effective Dates    Ileana Pearl, MS CCC-SLP 06/08/18 -           EDUCATION  The patient has been educated in the following areas:   Dysphagia (Swallowing Impairment) Oral Care/Hydration.    SLP Recommendation and Plan  SLP Swallowing Diagnosis: R/O pharyngeal dysphagia  SLP Diet Recommendation: regular textures, thin liquids  Recommended Precautions and Strategies: small bites of food and sips of liquid, upright posture during/after eating  SLP Rec. for Method of Medication Administration: meds whole, with thin liquids, with pudding or applesauce, as tolerated     Monitor for Signs of Aspiration: yes, notify SLP if any concerns     Swallow Criteria for Skilled Therapeutic Interventions Met:  demonstrates skilled criteria  Anticipated Discharge Disposition (SLP): skilled nursing facility  Rehab Potential/Prognosis, Swallowing: good, to achieve stated therapy goals  Therapy Frequency (Swallow): PRN  Predicted Duration Therapy Intervention (Days): until discharge                         Plan of Care Reviewed With: patient  Outcome Summary: Clinical swallow eval completed. Pt tolerated thins via cup and straw with no s/s. Mastication functional for solids with no s/s w/ soft, mixed, or regular solids. Oral cavity clear post swallow. Vocal quality clear during eval. Laryngeal elevation was adequate with palpation. Recommend regular diet with thin; meds as tolerated; upright for meals and 30 min after; slow rate; small bites/sips. Discussed energy conservation during meals. ST to follow. Monitor for s/s of aspiration and make NPO if s/s occur.    SLP GOALS     Row Name 03/05/21 1200             Oral Nutrition/Hydration Goal 1 (SLP)    Oral Nutrition/Hydration Goal 1, SLP  Pt will safely tolerate the least restrictive diet with no s/s of aspiration.  -AW      Time Frame (Oral Nutrition/Hydration Goal 1, SLP)  by discharge  -AW        User Key  (r) = Recorded By, (t) = Taken By, (c) = Cosigned By    Initials Name Provider Type    Ileaan Pearl MS CCC-SLP Speech and Language Pathologist           SLP Outcome Measures (last 72 hours)      SLP Outcome Measures     Row Name 03/05/21 1300             SLP Outcome Measures    Outcome Measure Used?  Adult NOMS  -AW         Adult FCM Scores    FCM Chosen  Swallowing  -AW      Swallowing FCM Score  6  -AW        User Key  (r) = Recorded By, (t) = Taken By, (c) = Cosigned By    Initials Name Effective Dates    Ileana Pearl MS CCC-SLP 06/08/18 -            Time Calculation:   Time Calculation- SLP     Row Name 03/05/21 1302             Time Calculation- SLP    SLP Start Time  1100  -AW      SLP Received On  03/05/21  -AW        User Key  (r) = Recorded By, (t) = Taken  By, (c) = Cosigned By    Initials Name Provider Type    AW Ileana Richard, MS CCC-SLP Speech and Language Pathologist          Therapy Charges for Today     Code Description Service Date Service Provider Modifiers Qty    71718840352  ST EVAL ORAL PHARYNG SWALLOW 4 3/5/2021 Ileana Richard, MS CCC-SLP GN 1               Ileana Richard MS CCC-SLP  3/5/2021

## 2021-03-05 NOTE — THERAPY EVALUATION
Patient Name: Garcia Garcia  : 1955    MRN: [id number]                              Today's Date: 3/5/2021       Admit Date: 3/4/2021    Visit Dx:     ICD-10-CM ICD-9-CM   1. Pneumonia due to infectious organism, unspecified laterality, unspecified part of lung  J18.9 486   2. Hypoxia  R09.02 799.02     Patient Active Problem List   Diagnosis   • Pneumonia due to infectious organism   • MITZI (obstructive sleep apnea)   • Severe depression (CMS/[facility])   • HLD (hyperlipidemia)   • HTN (hypertension)   • History of stroke   • GERD without esophagitis   • Diastolic CHF, chronic (CMS/[facility])   • COPD (chronic obstructive pulmonary disease) (CMS/[facility])   • Dementia (CMS/[facility])   • Lactic acidosis   • Leukocytosis   • Elevated d-dimer   • Type 2 diabetes mellitus with diabetic neuropathy, unspecified (CMS/[facility])   • Acute respiratory failure with hypoxia (CMS/[facility])     Past Medical History:   Diagnosis Date   • Acidosis    • Arthritis    • Asthma    • Atherosclerotic heart disease of native coronary artery without angina pectoris    • CHF (congestive heart failure) (CMS/[facility])    • Chronic obstructive pulmonary disease, unspecified (CMS/[facility])    • Cognitive communication deficit    • Cyst of kidney, acquired    • Elevated white blood cell count, unspecified    • Essential (primary) hypertension    • GERD (gastroesophageal reflux disease)    • Hemiplegia and hemiparesis following cerebral infarction affecting left non-dominant side (CMS/[facility])    • Hyperlipidemia, unspecified    • Major depressive disorder, recurrent, severe with psychotic symptoms (CMS/[facility])    • Muscle weakness (generalized)    • Other specified metabolic disorders (CMS/[facility])    • Secondary polycythemia    • Sepsis due to Streptococcus pneumoniae (CMS/[facility])    • Sleep apnea, unspecified    • Tachycardia, unspecified    • Toxic encephalopathy    • Type 2 diabetes mellitus with diabetic neuropathy, unspecified (CMS/[facility])    • Type 2 diabetes mellitus with hypoglycemia  without coma (CMS/[facility])    • Unspecified dementia with behavioral disturbance (CMS/[facility])      Past Surgical History:   Procedure Laterality Date   • KNEE SURGERY       General Information     Row Name 03/05/21 1259          Physical Therapy Time and Intention    Document Type  evaluation  -AE     Mode of Treatment  individual therapy;physical therapy  -AE     Row Name 03/05/21 1259          General Information    Patient Profile Reviewed  yes  -AE     Prior Level of Function  independent:  -AE     Existing Precautions/Restrictions  fall  -AE     Row Name 03/05/21 1259          Living Environment    Lives With  grandchild(sussy) admitted from SN[facility]  -AE     Row Name 03/05/21 1259          Cognition    Orientation Status (Cognition)  oriented to;person;place;situation;oriented x 3  -AE       User Key  (r) = Recorded By, (t) = Taken By, (c) = Cosigned By    Initials Name Provider Type    AE Ángela Melvin PT Physical Therapist        Mobility     Row Name 03/05/21 1300          Bed Mobility    Bed Mobility  bed mobility (all) activities  -AE     All Activities, [place] (Bed Mobility)  minimum assist (75% patient effort);1 person assist  -AE     Assistive Device (Bed Mobility)  bed rails;draw sheet;head of bed elevated  -AE     Row Name 03/05/21 1300          Transfers    Comment (Transfers)  Emily sit<>stand, Emily x 2 stand pivot transfer; unsteady on feet and symptomatic orthostatic hypotension  -AE     Row Name 03/05/21 1300          Bed-Chair Transfer    Bed-Chair [place] (Transfers)  minimum assist (75% patient effort);2 person assist  -AE     Assistive Device (Bed-Chair Transfers)  walker, front-wheeled  -AE     Row Name 03/05/21 1300          Sit-Stand Transfer    Sit-Stand [place] (Transfers)  minimum assist (75% patient effort);1 person assist  -AE     Assistive Device (Sit-Stand Transfers)  walker, front-wheeled  -AE     Row Name 03/05/21 1300          Gait/Stairs (Locomotion)    [place]  Level (Gait)  minimum assist (75% patient effort);2 person assist  -AE     Assistive Device (Gait)  walker, front-wheeled  -AE     Distance in Feet (Gait)  3-4 steps for stand pivot  -AE       User Key  (r) = Recorded By, (t) = Taken By, (c) = Cosigned By    Initials Name Provider Type    AE Ángela Melvin, PT Physical Therapist        Obj/Interventions     Row Name 03/05/21 1309          Range of Motion Comprehensive    Comment, General Range of Motion  BLE WFL  -AE     Row Name 03/05/21 1309          Strength Comprehensive (MMT)    Comment, General Manual Muscle Testing (MMT) Assessment  LLE weakness (from old CVA) RLE WFL  -AE       User Key  (r) = Recorded By, (t) = Taken By, (c) = Cosigned By    Initials Name Provider Type    AE Ángela Melvin, PT Physical Therapist        Goals/Plan     Row Name 03/05/21 1311          Bed Mobility Goal 1 (PT)    Activity/Assistive Device (Bed Mobility Goal 1, PT)  bed mobility activities, all  -AE     [place] Level/Cues Needed (Bed Mobility Goal 1, PT)  contact guard assist;1 person assist  -AE     Time Frame (Bed Mobility Goal 1, PT)  2 weeks  -AE     Progress/Outcomes (Bed Mobility Goal 1, PT)  continuing progress toward goal  -AE     Row Name 03/05/21 1311          Transfer Goal 1 (PT)    Activity/Assistive Device (Transfer Goal 1, PT)  transfers, all;sit-to-stand/stand-to-sit  -AE     [place] Level/Cues Needed (Transfer Goal 1, PT)  minimum assist (75% or more patient effort);1 person assist  -AE     Time Frame (Transfer Goal 1, PT)  2 weeks  -AE     Progress/Outcome (Transfer Goal 1, PT)  continuing progress toward goal  -AE     Row Name 03/05/21 1311          Gait Training Goal 1 (PT)    Activity/Assistive Device (Gait Training Goal 1, PT)  gait (walking locomotion);assistive device use  -AE     [place] Level (Gait Training Goal 1, PT)  minimum assist (75% or more patient effort);1 person assist  -AE     Distance (Gait Training Goal 1, PT)  40ft  -AE      Time Frame (Gait Training Goal 1, PT)  2 weeks  -AE     Progress/Outcome (Gait Training Goal 1, PT)  continuing progress toward goal  -AE       User Key  (r) = Recorded By, (t) = Taken By, (c) = Cosigned By    Initials Name Provider Type    Ángela Boyle PT Physical Therapist        Clinical Impression     Row Name 03/05/21 1309          Pain    Additional Documentation  Pain Scale: Numbers Pre/Post-Treatment (Group)  -AE     Row Name 03/05/21 1309          Pain Scale: Numbers Pre/Post-Treatment    Pretreatment Pain Rating  0/10 - no pain  -AE     Posttreatment Pain Rating  0/10 - no pain  -AE     Row Name 03/05/21 1309          Therapy Assessment/Plan (PT)    Patient/Family Therapy Goals Statement (PT)  Pt only wants to be able to drink water/and or [employer], unable to verbalize any other goals at this time  -AE     Rehab Potential (PT)  good, to achieve stated therapy goals  -AE     Criteria for Skilled Interventions Met (PT)  yes;meets criteria  -AE     Row Name 03/05/21 1309          Positioning and Restraints    Pre-Treatment Position  in bed  -AE     Post Treatment Position  chair  -AE     In Chair  sitting;call light within reach;encouraged to call for assist;exit alarm on  -AE       User Key  (r) = Recorded By, (t) = Taken By, (c) = Cosigned By    Initials Name Provider Type    Ángela Boyle PT Physical Therapist        Outcome Measures     Row Name 03/05/21 1312          How much help from another person do you currently need...    Turning from your back to your side while in flat bed without using bedrails?  3  -AE     Moving from lying on back to sitting on the side of a flat bed without bedrails?  2  -AE     Moving to and from a bed to a chair (including a wheelchair)?  2  -AE     Standing up from a chair using your arms (e.g., wheelchair, bedside chair)?  3  -AE     Climbing 3-5 steps with a railing?  1  -AE     To walk in hospital room?  2  -AE     AM-PAC 6 Clicks Score (PT)  13   -AE     Row Name 03/05/21 1312          Functional Assessment    Outcome Measure Options  AM-PAC 6 Clicks Basic Mobility (PT)  -AE       User Key  (r) = Recorded By, (t) = Taken By, (c) = Cosigned By    Initials Name Provider Type    AE Ángela Melvin, ALEJA Physical Therapist        Physical Therapy Education                 Title: PT OT SLP Therapies (Done)     Topic: Physical Therapy (Done)     Point: Mobility training (Done)     Learning Progress Summary           Patient Acceptance, E,TB, VU,NR by AE at 3/5/2021 1312                   Point: Home exercise program (Done)     Learning Progress Summary           Patient Acceptance, E,TB, VU,NR by AE at 3/5/2021 1312                   Point: Body mechanics (Done)     Learning Progress Summary           Patient Acceptance, E,TB, VU,NR by AE at 3/5/2021 1312                   Point: Precautions (Done)     Learning Progress Summary           Patient Acceptance, E,TB, VU,NR by AE at 3/5/2021 1312                               User Key     Initials Effective Dates Name Provider Type Discipline    AE 09/04/19 -  Ángela Melvin PT Physical Therapist PT              PT Recommendation and Plan  Planned Therapy Interventions (PT): balance training, bed mobility training, gait training, home exercise program, manual therapy techniques, motor coordination training, neuromuscular re-education, patient/family education, postural re-education, ROM (range of motion), stair training, strengthening, transfer training        Time Calculation:   PT Charges     Row Name 03/05/21 1323 03/05/21 1110          Time Calculation    Start Time  1030  -AE  --     Stop Time  1100  -AE  --     Time Calculation (min)  30 min  -AE  --     PT Received On  --  03/05/21  -AE     PT - Next Appointment  --  03/06/21  -AE     PT Goal Re-Cert Due Date  03/19/21  -AE  --        Time Calculation- PT    Total Timed Code Minutes- PT  20 minute(s)  -AE  --       User Key  (r) = Recorded By, (t) = Taken  By, (c) = Cosigned By    Initials Name Provider Type    AE Ángela Melvin, PT Physical Therapist        Therapy Charges for Today     Code Description Service Date Service Provider Modifiers Qty    01638672973 HC PT EVAL MOD COMPLEXITY 2 3/5/2021 Ángela Melvin, PT GP 1    [id number] HC PT THERAPEUTIC ACT EA 15 MIN 3/5/2021 Ángela Melvin, PT GP 1          PT G-Codes  Outcome Measure Options: AM-PAC 6 Clicks Basic Mobility (PT)  AM-PAC 6 Clicks Score (PT): 13    Ángela Melvin PT  3/5/2021

## 2021-03-05 NOTE — PLAN OF CARE
Goal Outcome Evaluation:  Plan of Care Reviewed With: patient, family(daughter-in-law present)  Progress: improving  Outcome Summary: Pt is 65 y.o. male admitted to [facility] from [facility] with shortness of breath. He is admitted for treatment of PNA and acute hypoxia. He has a h/o of dementia, hypertension, hyperlipidemia, CVA with L sided weakness, DM2, MITZI and GERD. Pt was recently admitted to [facility] for rehab post toxic encephaloptahy and sepsis PNA. He reports indep with ADLs PTA with use of needed/appropriate DME. Today, pt presents with generalized weaknes, shortness of breath, decreased functional activity tolerance, and impaired safety and indep with ADLs. Pt currently min A for LB dressing (donning/doffing socks), supervision for grooming tasks, min A for STS transfer to RWx, min A to take few steps using RWx. Pt fatigues and becomes short of breath quickly, however O2 sats remained >91%. Pt would benefit from skilled OT services while admitted acutely. OT recommends d/c to SNF for additional rehab prior to returning home. Pt strongly verbalizes he does not want to return to [facility] if he has to go somewhere for rehab.      Patient was not wearing a face mask during this therapy encounter. Therapist used appropriate personal protective equipment including surgical mask, eye shield and gloves during the entire therapy session. Hand hygiene was completed before and after therapy session. Patient is not in enhanced droplet precautions.

## 2021-03-05 NOTE — PROGRESS NOTES
Continued Stay Note  [facility]     Patient Name: Garcia Garcia  MRN: [id number]  Today's Date: 3/5/2021    Admit Date: 3/4/2021    Discharge Plan     Row Name 03/05/21 1408       Plan    Plan  [facility] with goal to go to [place] with family    Patient/Family in Agreement with Plan  yes    Plan Comments  Spoke with daughter-in-law Lakshmi Moses (POA/AD in [facility])  by telephone.  She states the ultimate goal is to get him to [place] where he is nearer to family. She is aware that he will likely need to go to SNF for some improvement before he is able to make the trip.  She is agreeable that current plan is for patient to return to [facility].  Packet in CCP office.  CCP will continue to follow.  [place] RN Becky S. [place], RN

## 2021-03-05 NOTE — PLAN OF CARE
Goal Outcome Evaluation:  Plan of Care Reviewed With: patient     Outcome Summary: Clinical swallow eval completed. Pt tolerated thins via cup and straw with no s/s. Mastication functional for solids with no s/s w/ soft, mixed, or regular solids. Oral cavity clear post swallow. Vocal quality clear during eval. Laryngeal elevation was adequate with palpation. Recommend regular diet with thin; meds as tolerated; upright for meals and 30 min after; slow rate; small bites/sips. Discussed energy conservation during meals. ST to follow. Monitor for s/s of aspiration and make NPO if s/s occur.

## 2021-03-05 NOTE — PROGRESS NOTES
"                            Danial Vivas MD                          [phone]      Patient ID:    Name:  Garcia Garcia    MRN:  [id number]    1955   65 y.o.  male            Patient Care Team:  Vargas Ling MD as PCP - General (Family Medicine)    CC/ Reason for visit: Acute respiratory failure, encephalopathy, possible aspiration pneumonia, asthma/COPD exacerbation    Subjective: Pt seen and examined this AM. Patient is much more awake and interactive today.  Sitting up in the chair and states that he is feeling better.  Does not remember about the events over the last 24 hours.  States that he is hungry and would like to eat.  He is n.p.o. for potential aspiration concerns and awaiting speech therapy clearance.  No new fevers or chills.  States that he used his BiPAP yesterday night.  Does have a CPAP at home but does not have a way to get it here.  States that he is still smoking \"a little\".    ROS: Denies any subjective fevers, syncope or presyncopal events, new neurological deficits, nausea or vomiting currently    Objective     Vital Signs past 24hrs    BP range: BP: ()/(74-87) 142/87  Pulse range: Heart Rate:  [] 98  Resp rate range: Resp:  [15-34] 18  Temp range: Temp (24hrs), Av.1 °F (36.7 °C), Min:98 °F (36.7 °C), Max:98.2 °F (36.8 °C)      Ventilator/Non-Invasive Ventilation Settings (From admission, onward)     Start     Ordered    21 1514  NIPPV (CPAP or BIPAP)  Until Discontinued     Question Answer Comment   Indication: Acute Respiratory Failure    Type: AVAPS/PC/PS    NIPPV Mask Interface: Per Patient Preference    Backup Rate 16    Target VT (mL) 450    EPAP/PEEP (cm H2O) 7    Min Pressure (cm H2O) 12    Max Pressure (cm H2O) 30    Titrate for SPO2 90%        21 1514                Device (Oxygen Therapy): high-flow nasal cannula       123 kg (270 lb 11.6 oz); Body mass index is 42.39 kg/m².      Intake/Output Summary (Last 24 hours) at " 3/5/2021 1353  Last data filed at 3/5/2021 1115  Gross per 24 hour   Intake 660 ml   Output 700 ml   Net -40 ml       PHYSICAL EXAM   Constitutional:  Middle-aged morbidly obese white male, awake, no respiratory distress, + use of accessory muscles.  Head: - NCAT  Eyes: No pallor, Anicteric conjunctiva, EOMI.  ENMT:  Mallampati 4, no oral thrush. Moist MM.   NECK: Trachea midline, No thyromegaly, no palpable cervical LNpathy  Heart: RRR, no murmur. Trace pedal edema   Lungs: ANNELISE +,No wheezes/ no crackles heard    Abdomen: Soft.  Obese, No guarding or rigidity. No palpable masses  Extremities: Extremities warm and well perfused. No cyanosis/ clubbing  Neuro: Conscious, Awake, no new gross focal neuro deficits  Psych: Mood and affect -comfortable    PPE recommended per [facility] infectious disease Isolation protocol for the current clinical scenario(as mentioned below) was followed.     Scheduled meds:  enoxaparin, 40 mg, Subcutaneous, Q12H  hydrocortisone-bacitracin-zinc oxide-nystatin, 1 application, Topical, BID  ipratropium-albuterol, 3 mL, Nebulization, 4x Daily - RT  Levothyroxine Sodium, 18.75 mcg, Intravenous, Daily  methylPREDNISolone sodium succinate, 40 mg, Intravenous, Q8H  metoprolol tartrate, 5 mg, Intravenous, Q6H  nystatin, , Topical, Q12H  piperacillin-tazobactam, 3.375 g, Intravenous, Q8H  vancomycin, 1,250 mg, Intravenous, Q12H        IV meds:                      Pharmacy to dose vancomycin,         Data Review:      Results from last 7 days   Lab Units 03/05/21  0659 03/04/21  0215   SODIUM mmol/L 143 136   POTASSIUM mmol/L 3.9 3.5   CHLORIDE mmol/L 104 100   CO2 mmol/L 25.5 22.0   BUN mg/dL 24* 18   CREATININE mg/dL 0.75* 0.77   CALCIUM mg/dL 9.0 8.7   BILIRUBIN mg/dL 0.4 0.8   ALK PHOS U/L 74 76   ALT (SGPT) U/L 40 49*   AST (SGOT) U/L 29 42*   GLUCOSE mg/dL 135* 136*   WBC 10*3/mm3 11.79* 18.79*   HEMOGLOBIN g/dL 14.4 14.4   PLATELETS 10*3/mm3 345 369   PROBNP pg/mL  --  383.8    PROCALCITONIN ng/mL 0.10 0.17       Lab Results   Component Value Date    CALCIUM 9.0 03/05/2021       Results from last 7 days   Lab Units 03/04/21  0344 03/04/21  0333   BLOODCX  No growth at 24 hours No growth at 24 hours       Results from last 7 days   Lab Units 03/04/21  1232   PH, ARTERIAL pH units 7.388   PO2 ART mm Hg 55.9*   PCO2, ARTERIAL mm Hg 41.7   HCO3 ART mmol/L 25.1        Results Review:    I have reviewed the relevant laboratory results and independently reviewed the chest imaging from this hospitalization including the available echocardiogram reports personally and summarized it if/ when appropriate below    Assessment    Acute hypoxic respiratory failure; severe and worsening  Noninvasive ventilator use  Acute metabolic encephalopathy  Concern for aspiration pneumonia  Asthma/COPD exacerbation  Active tobacco abuse  COVID-19 ruled out x2  Dysphagia  CVA with left hemiparesis  MITZI  Morbid obesity  Chronic benzodiazepine use  Baseline cognitive defect/dementia    PLAN:  Patient has done very well and has made significant progress with noninvasive ventilator use and is much more awake and interactive.  Suspect this to be multifactorial from COPD exacerbation as well as encephalopathy and potential benzodiazepine effect.  Continue with steroids but will switch to p.o.  SLP evaluation before starting diet given concern for aspiration  Continue with empiric antibiotics for pneumonia and narrow  States that he cannot get his own CPAP machine so will use hospital machine for now  Lower extremity venous duplex is negative for any pulmonary embolism.  Does not seem to be significantly volume overloaded.  Would strongly recommend to avoid sedating medications  Tobacco cessation counseling provided  Out of bed as much as tolerated    Guarded prognosis    Danial Vivas MD  3/5/2021

## 2021-03-05 NOTE — THERAPY EVALUATION
Patient Name: Garcia Garcia  : 1955    MRN: [id number]                              Today's Date: 3/5/2021       Admit Date: 3/4/2021    Visit Dx:     ICD-10-CM ICD-9-CM   1. Pneumonia due to infectious organism, unspecified laterality, unspecified part of lung  J18.9 486   2. Hypoxia  R09.02 799.02     Patient Active Problem List   Diagnosis   • Pneumonia due to infectious organism   • MITZI (obstructive sleep apnea)   • Severe depression (CMS/[facility])   • HLD (hyperlipidemia)   • HTN (hypertension)   • History of stroke   • GERD without esophagitis   • Diastolic CHF, chronic (CMS/[facility])   • COPD (chronic obstructive pulmonary disease) (CMS/[facility])   • Dementia (CMS/[facility])   • Lactic acidosis   • Leukocytosis   • Elevated d-dimer   • Type 2 diabetes mellitus with diabetic neuropathy, unspecified (CMS/[facility])   • Acute respiratory failure with hypoxia (CMS/[facility])   • Vitamin D deficiency     Past Medical History:   Diagnosis Date   • Acidosis    • Arthritis    • Asthma    • Atherosclerotic heart disease of native coronary artery without angina pectoris    • CHF (congestive heart failure) (CMS/[facility])    • Chronic obstructive pulmonary disease, unspecified (CMS/[facility])    • Cognitive communication deficit    • Cyst of kidney, acquired    • Elevated white blood cell count, unspecified    • Essential (primary) hypertension    • GERD (gastroesophageal reflux disease)    • Hemiplegia and hemiparesis following cerebral infarction affecting left non-dominant side (CMS/[facility])    • Hyperlipidemia, unspecified    • Major depressive disorder, recurrent, severe with psychotic symptoms (CMS/[facility])    • Muscle weakness (generalized)    • Other specified metabolic disorders (CMS/[facility])    • Secondary polycythemia    • Sepsis due to Streptococcus pneumoniae (CMS/[facility])    • Sleep apnea, unspecified    • Tachycardia, unspecified    • Toxic encephalopathy    • Type 2 diabetes mellitus with diabetic neuropathy, unspecified (CMS/[facility])    • Type 2 diabetes  mellitus with hypoglycemia without coma (CMS/H[facility])    • Unspecified dementia with behavioral disturbance (CMS/H[facility])      Past Surgical History:   Procedure Laterality Date   • KNEE SURGERY       General Information     Row Name 03/05/21 1449          OT Time and Intention    Document Type  evaluation  -BILL     Mode of Treatment  individual therapy;occupational therapy  -BILL     Row Name 03/05/21 1449          General Information    Patient Profile Reviewed  yes  -BILL     Prior Level of Function  independent:;ADL's reports using BSC, elevated toilet seat that is too high and he does not like it, rollator, Rwx, and electric scooter  -BILL     Existing Precautions/Restrictions  fall  -BILL     Row Name 03/05/21 1449          Living Environment    Lives With  grandchild(sussy) Lives with grandson, but was admitted from [facility] where he was receiving rehab  -BILL     Row Name 03/05/21 1449          Cognition    Orientation Status (Cognition)  oriented to;person;place;situation;oriented x 3  -BILL     Row Name 03/05/21 1449          Safety Issues, Functional Mobility    Impairments Affecting Function (Mobility)  endurance/activity tolerance;shortness of breath;strength  -BILL       User Key  (r) = Recorded By, (t) = Taken By, (c) = Cosigned By    Initials Name Provider Type    BILL Fior Gonzalez, OT Occupational Therapist          Mobility/ADL's     Row Name 03/05/21 1451          Bed Mobility    Comment (Bed Mobility)  not assessed. Pt UIC and returned to chair.  -BILL     Row Name 03/05/21 1451          Transfers    Transfers  sit-stand transfer  -BILL     Sit-Stand [place] (Transfers)  minimum assist (75% patient effort);1 person assist;contact guard  -BILL     Row Name 03/05/21 1451          Sit-Stand Transfer    Assistive Device (Sit-Stand Transfers)  walker, front-wheeled  -BILL     Row Name 03/05/21 1451          Functional Mobility    Functional Mobility- Ind. Level  minimum assist (75% patient effort);contact guard  assist;verbal cues required  -BILL     Functional Mobility- Device  rolling walker  -BILL     Functional Mobility- Comment  took 5-6 steps away from recliner and 5-6 steps back towards recliner. Fatigued, with poor functional activity tolerance  -BILL     Row Name 03/05/21 1451          Activities of Daily Living    BADL Assessment/Intervention  lower body dressing;grooming;feeding;toileting  -BILL     Row Name 03/05/21 1451          Lower Body Dressing Assessment/Training    [place] Level (Lower Body Dressing)  doff;don;socks;minimum assist (75% patient effort)  -BILL     Position (Lower Body Dressing)  supported sitting  -BILL     Comment (Lower Body Dressing)  able to doff socks, but required min A to don them back on  -BILL     Row Name 03/05/21 1451          Grooming Assessment/Training    [place] Level (Grooming)  grooming skills;oral care regimen;wash face, hands;supervision  -BILL     Position (Grooming)  supported sitting  -BILL     Row Name 03/05/21 1451          Self-Feeding Assessment/Training    Comment (Feeding)  reports he is able to self-feed. Pt was able to drink from cup this date with no difficulty  -BILL     Row Name 03/05/21 1451          Toileting Assessment/Training    Comment (Toileting)  pt reports he has not had to use the restroom, but there is a BSC and 2 urinals beside him  -BILL       User Key  (r) = Recorded By, (t) = Taken By, (c) = Cosigned By    Initials Name Provider Type    BILL Fior Gonzalez, OT Occupational Therapist        Obj/Interventions     Row Name 03/05/21 1453          Sensory Assessment (Somatosensory)    Sensory Assessment (Somatosensory)  UE sensation intact  -BILL     Row Name 03/05/21 1453          Vision Assessment/Intervention    Visual Impairment/Limitations  WFL L eye red and irritated. Reports he typically has eye drops that he uses.  -BILL     Row Name 03/05/21 1453          Range of Motion Comprehensive    General Range of Motion  bilateral upper extremity ROM WNL  -BILL      Row Name 03/05/21 1453          Strength Comprehensive (MMT)    Comment, General Manual Muscle Testing (MMT) Assessment  generalized B UE weakness.  -BILL     Row Name 03/05/21 1453          Balance    Balance Assessment  sitting static balance;sitting dynamic balance;sit to stand dynamic balance;standing static balance;standing dynamic balance  -BILL     Static Sitting Balance  WFL;sitting in chair  -BILL     Dynamic Sitting Balance  mild impairment;sitting in chair  -BILL     Sit to Stand Dynamic Balance  mild impairment;supported;standing  -BILL     Static Standing Balance  mild impairment;supported;standing  -BILL     Dynamic Standing Balance  mild impairment;supported;standing  -BILL     Balance Interventions  sitting;standing;sit to stand;supported;static;dynamic;occupation based/functional task  -BILL       User Key  (r) = Recorded By, (t) = Taken By, (c) = Cosigned By    Initials Name Provider Type    Fior Sinhg, OT Occupational Therapist        Goals/Plan     Row Name 03/05/21 1503          Transfer Goal 1 (OT)    Activity/Assistive Device (Transfer Goal 1, OT)  transfers, all;commode, bedside without drop arms;toilet  -BILL     [place] Level/Cues Needed (Transfer Goal 1, OT)  standby assist  -BILL     Time Frame (Transfer Goal 1, OT)  2 weeks;short term goal (STG)  -BILL     Progress/Outcome (Transfer Goal 1, OT)  goal ongoing  -BILL     Row Name 03/05/21 1503          Bathing Goal 1 (OT)    Activity/Device (Bathing Goal 1, OT)  bathing skills, all;upper body bathing;lower body bathing  -BILL     [place] Level/Cues Needed (Bathing Goal 1, OT)  set-up required  -BILL     Time Frame (Bathing Goal 1, OT)  2 weeks;short term goal (STG)  -BILL     Progress/Outcomes (Bathing Goal 1, OT)  goal ongoing  -BILL     Row Name 03/05/21 1503          Dressing Goal 1 (OT)    Activity/Device (Dressing Goal 1, OT)  dressing skills, all;lower body dressing  -BILL     [place]/Cues Needed (Dressing Goal 1, OT)  supervision required  -BILL      Time Frame (Dressing Goal 1, OT)  2 weeks;short term goal (STG)  -BILL     Row Name 03/05/21 0389          Therapy Assessment/Plan (OT)    Planned Therapy Interventions (OT)  activity tolerance training;adaptive equipment training;BADL retraining;functional balance retraining;IADL retraining;occupation/activity based interventions;patient/caregiver education/training;ROM/therapeutic exercise;strengthening exercise;transfer/mobility retraining  -BILL       User Key  (r) = Recorded By, (t) = Taken By, (c) = Cosigned By    Initials Name Provider Type    Fior Singh, OT Occupational Therapist        Clinical Impression     Row Name 03/05/21 0666          Pain Assessment    Additional Documentation  Pain Scale: Numbers Pre/Post-Treatment (Group)  -BILL     Row Name 03/05/21 2902          Pain Scale: Numbers Pre/Post-Treatment    Pretreatment Pain Rating  0/10 - no pain  -BILL     Posttreatment Pain Rating  0/10 - no pain  -BILL     Row Name 03/05/21 3447          Plan of Care Review    Plan of Care Reviewed With  patient;family daughter-in-law present  -BILL     Progress  improving  -BILL     Outcome Summary  Pt is 65 y.o. male admitted to [facility] from [facility] with shortness of breath. He is admitted for treatment of PNA and acute hypoxia. He has a h/o of dementia, hypertension, hyperlipidemia, CVA with L sided weakness, DM2, MITZI and GERD. Pt was recently admitted to [facility] for rehab post toxic encephaloptahy and sepsis PNA. He reports indep with ADLs PTA with use of needed/appropriate DME. Today, pt presents with generalized weaknes, shortness of breath, decreased functional activity tolerance, and impaired safety and indep with ADLs. Pt currently min A for LB dressing (donning/doffing socks), supervision for grooming tasks, min A for STS transfer to RWx, min A to take few steps using RWx. Pt fatigues and becomes short of breath quickly, however O2 sats remained >91%. Pt would benefit from skilled OT services  while admitted acutely. OT recommends d/c to SNF for additional rehab prior to returning home. Pt strongly verbalizes he does not want to return to [facility] if he has to go somewhere for rehab.  -BILL     Row Name 03/05/21 1452          Therapy Assessment/Plan (OT)    Rehab Potential (OT)  good, to achieve stated therapy goals  -BILL     Criteria for Skilled Therapeutic Interventions Met (OT)  yes  -BILL     Therapy Frequency (OT)  5 times/wk  -BILL     Row Name 03/05/21 1458          Therapy Plan Review/Discharge Plan (OT)    Anticipated Discharge Disposition (OT)  skilled nursing facility  -BILL     Row Name 03/05/21 1459          Positioning and Restraints    Pre-Treatment Position  sitting in chair/recliner  -BILL     Post Treatment Position  chair  -BILL     In Chair  notified nsg;sitting;call light within reach;encouraged to call for assist;exit alarm on;with family/caregiver  -BILL       User Key  (r) = Recorded By, (t) = Taken By, (c) = Cosigned By    Initials Name Provider Type    Fior Singh OT Occupational Therapist        Outcome Measures     Row Name 03/05/21 6747          How much help from another is currently needed...    Putting on and taking off regular lower body clothing?  2  -BILL     Bathing (including washing, rinsing, and drying)  2  -BILL     Toileting (which includes using toilet bed pan or urinal)  2  -BILL     Putting on and taking off regular upper body clothing  2  -BILL     Taking care of personal grooming (such as brushing teeth)  3  -BILL     Eating meals  3  -BILL     AM-PAC 6 Clicks Score (OT)  14  -BILL     Row Name 03/05/21 1504          Functional Assessment    Outcome Measure Options  AM-PAC 6 Clicks Daily Activity (OT)  -BILL       User Key  (r) = Recorded By, (t) = Taken By, (c) = Cosigned By    Initials Name Provider Type    Fior Singh OT Occupational Therapist        Occupational Therapy Education                 Title: PT OT SLP Therapies (In Progress)     Topic: Occupational  Therapy (In Progress)     Point: ADL training (Done)     Description:   Instruct learner(s) on proper safety adaptation and remediation techniques during self care or transfers.   Instruct in proper use of assistive devices.              Learning Progress Summary           Patient Acceptance, E, VU by BILL at 3/5/2021 1505    Comment: Educated pt on OT role, OT plan of care, safety techniques for ADLs and functional transfers, as well as energy conservation techniques.   Family Acceptance, E, VU by BILL at 3/5/2021 1505    Comment: Educated pt on OT role, OT plan of care, safety techniques for ADLs and functional transfers, as well as energy conservation techniques.                   Point: Home exercise program (Not Started)     Description:   Instruct learner(s) on appropriate technique for monitoring, assisting and/or progressing therapeutic exercises/activities.              Learner Progress:  Not documented in this visit.          Point: Precautions (Done)     Description:   Instruct learner(s) on prescribed precautions during self-care and functional transfers.              Learning Progress Summary           Patient Acceptance, E, VU by BILL at 3/5/2021 1505    Comment: Educated pt on OT role, OT plan of care, safety techniques for ADLs and functional transfers, as well as energy conservation techniques.   Family Acceptance, E, VU by BILL at 3/5/2021 1505    Comment: Educated pt on OT role, OT plan of care, safety techniques for ADLs and functional transfers, as well as energy conservation techniques.                   Point: Body mechanics (Done)     Description:   Instruct learner(s) on proper positioning and spine alignment during self-care, functional mobility activities and/or exercises.              Learning Progress Summary           Patient Acceptance, E, VU by BILL at 3/5/2021 1505    Comment: Educated pt on OT role, OT plan of care, safety techniques for ADLs and functional transfers, as well as energy  conservation techniques.   Family Acceptance, E, VU by BILL at 3/5/2021 6203    Comment: Educated pt on OT role, OT plan of care, safety techniques for ADLs and functional transfers, as well as energy conservation techniques.                               User Key     Initials Effective Dates Name Provider Type Discipline    BILL 09/14/20 -  Fior Gonzalez, OT Occupational Therapist OT              OT Recommendation and Plan  Planned Therapy Interventions (OT): activity tolerance training, adaptive equipment training, BADL retraining, functional balance retraining, IADL retraining, occupation/activity based interventions, patient/caregiver education/training, ROM/therapeutic exercise, strengthening exercise, transfer/mobility retraining  Therapy Frequency (OT): 5 times/wk  Plan of Care Review  Plan of Care Reviewed With: patient, family(daughter-in-law present)  Progress: improving  Outcome Summary: Pt is 65 y.o. male admitted to [facility] from [facility] with shortness of breath. He is admitted for treatment of PNA and acute hypoxia. He has a h/o of dementia, hypertension, hyperlipidemia, CVA with L sided weakness, DM2, MITZI and GERD. Pt was recently admitted to [facility] for rehab post toxic encephaloptahy and sepsis PNA. He reports indep with ADLs PTA with use of needed/appropriate DME. Today, pt presents with generalized weaknes, shortness of breath, decreased functional activity tolerance, and impaired safety and indep with ADLs. Pt currently min A for LB dressing (donning/doffing socks), supervision for grooming tasks, min A for STS transfer to RWx, min A to take few steps using RWx. Pt fatigues and becomes short of breath quickly, however O2 sats remained >91%. Pt would benefit from skilled OT services while admitted acutely. OT recommends d/c to SNF for additional rehab prior to returning home. Pt strongly verbalizes he does not want to return to [facility] if he has to go somewhere for rehab.     Time  Calculation:   Time Calculation- OT     Row Name 03/05/21 1507             Time Calculation- OT    OT Start Time  1409  -BILL      OT Stop Time  1441  -BILL      OT Time Calculation (min)  32 min  -BILL      Total Timed Code Minutes- OT  23 minute(s)  -BILL      OT Received On  03/05/21  -BILL      OT - Next Appointment  03/08/21  -BILL        User Key  (r) = Recorded By, (t) = Taken By, (c) = Cosigned By    Initials Name Provider Type    Fior Singh, OT Occupational Therapist        Therapy Charges for Today     Code Description Service Date Service Provider Modifiers Qty    11278575992 HC OT EVAL MOD COMPLEXITY 2 3/5/2021 Fior Gonzalez OT GO 1    65266936337 HC OT SELF CARE/MGMT/TRAIN EA 15 MIN 3/5/2021 Fior Gonzalez OT GO 1    23406718741 HC OT THERAPEUTIC ACT EA 15 MIN 3/5/2021 Fior Gonzalez OT GO 1               Fior Gonzalez OT  3/5/2021

## 2021-03-05 NOTE — PLAN OF CARE
Problem: Skin Injury Risk Increased  Goal: Skin Health and Integrity  Outcome: Ongoing, Progressing  Intervention: Optimize Skin Protection    Problem: Infection (Pneumonia)  Goal: Resolution of Infection Signs and Symptoms  Outcome: Ongoing, Progressing     Problem: Respiratory Compromise (Pneumonia)  Goal: Effective Oxygenation and Ventilation  Outcome: Ongoing, Progressing  Intervention: Optimize Oxygenation and Ventilation    Goal Outcome Evaluation:  Plan of Care Reviewed With: patient    Outcome Summary: Patient on Bipap at start of shift, now on 3L NC. Remains confused, difficult to understand, and lethargic. F/C in place with janiya output. Micotin powder and magic barrier cream routinely. BP has been stable this shift, but held IV metoprolol. Remains NPO until SLP can evaluate. wctm.

## 2021-03-06 PROBLEM — E87.6 HYPOKALEMIA: Status: ACTIVE | Noted: 2021-03-06

## 2021-03-06 LAB
ALBUMIN SERPL-MCNC: 2.3 G/DL (ref 3.5–5.2)
ALBUMIN/GLOB SERPL: 0.7 G/DL
ALP SERPL-CCNC: 68 U/L (ref 39–117)
ALT SERPL W P-5'-P-CCNC: 65 U/L (ref 1–41)
ANION GAP SERPL CALCULATED.3IONS-SCNC: 9.8 MMOL/L (ref 5–15)
AST SERPL-CCNC: 58 U/L (ref 1–40)
BASOPHILS # BLD AUTO: 0.02 10*3/MM3 (ref 0–0.2)
BASOPHILS NFR BLD AUTO: 0.2 % (ref 0–1.5)
BILIRUB SERPL-MCNC: 0.6 MG/DL (ref 0–1.2)
BUN SERPL-MCNC: 20 MG/DL (ref 8–23)
BUN/CREAT SERPL: 28.6 (ref 7–25)
C DIFF TOX GENS STL QL NAA+PROBE: NEGATIVE
CALCIUM SPEC-SCNC: 8.3 MG/DL (ref 8.6–10.5)
CHLORIDE SERPL-SCNC: 99 MMOL/L (ref 98–107)
CO2 SERPL-SCNC: 26.2 MMOL/L (ref 22–29)
CREAT SERPL-MCNC: 0.7 MG/DL (ref 0.76–1.27)
DEPRECATED RDW RBC AUTO: 44.8 FL (ref 37–54)
EOSINOPHIL # BLD AUTO: 0.01 10*3/MM3 (ref 0–0.4)
EOSINOPHIL NFR BLD AUTO: 0.1 % (ref 0.3–6.2)
ERYTHROCYTE [DISTWIDTH] IN BLOOD BY AUTOMATED COUNT: 15 % (ref 12.3–15.4)
GFR SERPL CREATININE-BSD FRML MDRD: 114 ML/MIN/1.73
GLOBULIN UR ELPH-MCNC: 3.4 GM/DL
GLUCOSE BLDC GLUCOMTR-MCNC: 100 MG/DL (ref 70–130)
GLUCOSE BLDC GLUCOMTR-MCNC: 148 MG/DL (ref 70–130)
GLUCOSE BLDC GLUCOMTR-MCNC: 151 MG/DL (ref 70–130)
GLUCOSE BLDC GLUCOMTR-MCNC: 91 MG/DL (ref 70–130)
GLUCOSE SERPL-MCNC: 98 MG/DL (ref 65–99)
HCT VFR BLD AUTO: 39.5 % (ref 37.5–51)
HGB BLD-MCNC: 13.6 G/DL (ref 13–17.7)
IMM GRANULOCYTES # BLD AUTO: 0.1 10*3/MM3 (ref 0–0.05)
IMM GRANULOCYTES NFR BLD AUTO: 0.8 % (ref 0–0.5)
LYMPHOCYTES # BLD AUTO: 1.63 10*3/MM3 (ref 0.7–3.1)
LYMPHOCYTES NFR BLD AUTO: 13.6 % (ref 19.6–45.3)
MAGNESIUM SERPL-MCNC: 1.8 MG/DL (ref 1.6–2.4)
MCH RBC QN AUTO: 28.5 PG (ref 26.6–33)
MCHC RBC AUTO-ENTMCNC: 34.4 G/DL (ref 31.5–35.7)
MCV RBC AUTO: 82.8 FL (ref 79–97)
MONOCYTES # BLD AUTO: 0.83 10*3/MM3 (ref 0.1–0.9)
MONOCYTES NFR BLD AUTO: 6.9 % (ref 5–12)
NEUTROPHILS NFR BLD AUTO: 78.4 % (ref 42.7–76)
NEUTROPHILS NFR BLD AUTO: 9.36 10*3/MM3 (ref 1.7–7)
NRBC BLD AUTO-RTO: 0 /100 WBC (ref 0–0.2)
PLATELET # BLD AUTO: 293 10*3/MM3 (ref 140–450)
PMV BLD AUTO: 9.2 FL (ref 6–12)
POTASSIUM SERPL-SCNC: 3.4 MMOL/L (ref 3.5–5.2)
PROT SERPL-MCNC: 5.7 G/DL (ref 6–8.5)
RBC # BLD AUTO: 4.77 10*6/MM3 (ref 4.14–5.8)
SODIUM SERPL-SCNC: 135 MMOL/L (ref 136–145)
WBC # BLD AUTO: 11.95 10*3/MM3 (ref 3.4–10.8)

## 2021-03-06 PROCEDURE — 82962 GLUCOSE BLOOD TEST: CPT

## 2021-03-06 PROCEDURE — 25010000002 PIPERACILLIN SOD-TAZOBACTAM PER 1 G: Performed by: HOSPITALIST

## 2021-03-06 PROCEDURE — 83735 ASSAY OF MAGNESIUM: CPT | Performed by: HOSPITALIST

## 2021-03-06 PROCEDURE — 94799 UNLISTED PULMONARY SVC/PX: CPT

## 2021-03-06 PROCEDURE — 94640 AIRWAY INHALATION TREATMENT: CPT

## 2021-03-06 PROCEDURE — 63710000001 PREDNISONE PER 1 MG: Performed by: INTERNAL MEDICINE

## 2021-03-06 PROCEDURE — 80053 COMPREHEN METABOLIC PANEL: CPT | Performed by: HOSPITALIST

## 2021-03-06 PROCEDURE — 87493 C DIFF AMPLIFIED PROBE: CPT | Performed by: NURSE PRACTITIONER

## 2021-03-06 PROCEDURE — 85025 COMPLETE CBC W/AUTO DIFF WBC: CPT | Performed by: HOSPITALIST

## 2021-03-06 PROCEDURE — 25010000002 ENOXAPARIN PER 10 MG: Performed by: HOSPITALIST

## 2021-03-06 RX ORDER — POTASSIUM CHLORIDE 750 MG/1
40 TABLET, FILM COATED, EXTENDED RELEASE ORAL AS NEEDED
Status: DISCONTINUED | OUTPATIENT
Start: 2021-03-06 | End: 2021-03-11 | Stop reason: HOSPADM

## 2021-03-06 RX ORDER — POTASSIUM CHLORIDE 1.5 G/1.77G
40 POWDER, FOR SOLUTION ORAL AS NEEDED
Status: DISCONTINUED | OUTPATIENT
Start: 2021-03-06 | End: 2021-03-11 | Stop reason: HOSPADM

## 2021-03-06 RX ADMIN — NYSTATIN: 100000 POWDER TOPICAL at 20:44

## 2021-03-06 RX ADMIN — LISINOPRIL 2.5 MG: 2.5 TABLET ORAL at 09:51

## 2021-03-06 RX ADMIN — LEVOTHYROXINE SODIUM 25 MCG: 0.03 TABLET ORAL at 05:57

## 2021-03-06 RX ADMIN — MONTELUKAST SODIUM 10 MG: 10 TABLET, FILM COATED ORAL at 20:43

## 2021-03-06 RX ADMIN — PREDNISONE 40 MG: 20 TABLET ORAL at 09:50

## 2021-03-06 RX ADMIN — BUSPIRONE HYDROCHLORIDE 15 MG: 15 TABLET ORAL at 15:49

## 2021-03-06 RX ADMIN — ASPIRIN 81 MG: 81 TABLET, COATED ORAL at 09:50

## 2021-03-06 RX ADMIN — PANTOPRAZOLE SODIUM 40 MG: 40 TABLET, DELAYED RELEASE ORAL at 09:50

## 2021-03-06 RX ADMIN — BUDESONIDE AND FORMOTEROL FUMARATE DIHYDRATE 2 PUFF: 80; 4.5 AEROSOL RESPIRATORY (INHALATION) at 20:54

## 2021-03-06 RX ADMIN — POTASSIUM CHLORIDE 40 MEQ: 750 TABLET, EXTENDED RELEASE ORAL at 15:49

## 2021-03-06 RX ADMIN — ENOXAPARIN SODIUM 40 MG: 40 INJECTION SUBCUTANEOUS at 09:50

## 2021-03-06 RX ADMIN — ALLOPURINOL 200 MG: 100 TABLET ORAL at 09:50

## 2021-03-06 RX ADMIN — Medication 1 APPLICATION: at 09:51

## 2021-03-06 RX ADMIN — NYSTATIN: 100000 POWDER TOPICAL at 09:52

## 2021-03-06 RX ADMIN — IPRATROPIUM BROMIDE AND ALBUTEROL SULFATE 3 ML: 2.5; .5 SOLUTION RESPIRATORY (INHALATION) at 16:50

## 2021-03-06 RX ADMIN — TAZOBACTAM SODIUM AND PIPERACILLIN SODIUM 3.38 G: 375; 3 INJECTION, SOLUTION INTRAVENOUS at 05:57

## 2021-03-06 RX ADMIN — BUSPIRONE HYDROCHLORIDE 15 MG: 15 TABLET ORAL at 09:50

## 2021-03-06 RX ADMIN — CLOPIDOGREL 75 MG: 75 TABLET, FILM COATED ORAL at 09:51

## 2021-03-06 RX ADMIN — ENOXAPARIN SODIUM 40 MG: 40 INJECTION SUBCUTANEOUS at 20:43

## 2021-03-06 RX ADMIN — DIVALPROEX SODIUM 250 MG: 250 TABLET, DELAYED RELEASE ORAL at 09:50

## 2021-03-06 RX ADMIN — BUDESONIDE AND FORMOTEROL FUMARATE DIHYDRATE 2 PUFF: 80; 4.5 AEROSOL RESPIRATORY (INHALATION) at 08:39

## 2021-03-06 RX ADMIN — DIVALPROEX SODIUM 250 MG: 250 TABLET, DELAYED RELEASE ORAL at 20:44

## 2021-03-06 RX ADMIN — TAZOBACTAM SODIUM AND PIPERACILLIN SODIUM 3.38 G: 375; 3 INJECTION, SOLUTION INTRAVENOUS at 13:20

## 2021-03-06 RX ADMIN — METOPROLOL TARTRATE 50 MG: 50 TABLET, FILM COATED ORAL at 20:43

## 2021-03-06 RX ADMIN — Medication 1 APPLICATION: at 20:44

## 2021-03-06 RX ADMIN — BUSPIRONE HYDROCHLORIDE 15 MG: 15 TABLET ORAL at 20:43

## 2021-03-06 RX ADMIN — IPRATROPIUM BROMIDE AND ALBUTEROL SULFATE 3 ML: 2.5; .5 SOLUTION RESPIRATORY (INHALATION) at 11:37

## 2021-03-06 RX ADMIN — TAZOBACTAM SODIUM AND PIPERACILLIN SODIUM 3.38 G: 375; 3 INJECTION, SOLUTION INTRAVENOUS at 20:43

## 2021-03-06 RX ADMIN — BUPROPION HYDROCHLORIDE 450 MG: 150 TABLET, EXTENDED RELEASE ORAL at 09:50

## 2021-03-06 RX ADMIN — METOPROLOL TARTRATE 50 MG: 50 TABLET, FILM COATED ORAL at 09:50

## 2021-03-06 RX ADMIN — ATORVASTATIN CALCIUM 20 MG: 20 TABLET, FILM COATED ORAL at 20:43

## 2021-03-06 NOTE — PROGRESS NOTES
[facility]  [phone]  Rodger Polanco MD    Subjective:  LOS: 2    States he feels poorly today.  He is laying in bed and is ill-appearing.  Had loose stools.    Objective   Vital Signs past 24hrs    Temp range: Temp (24hrs), Av.9 °F (36.6 °C), Min:97.8 °F (36.6 °C), Max:97.9 °F (36.6 °C)    BP range: BP: ()/(57-81) 122/81  Pulse range: Heart Rate:  [67-91] 71  Resp rate range: Resp:  [16-20] 16    Device (Oxygen Therapy): humidified;high-flow nasal cannulaFlow (L/min):  [3-8] 3  Oxygen range:SpO2:  [90 %-98 %] 93 %      129 kg (283 lb 4.7 oz); Body mass index is 44.36 kg/m².    Intake/Output Summary (Last 24 hours) at 3/6/2021 1558  Last data filed at 3/6/2021 1300  Gross per 24 hour   Intake 1460 ml   Output 400 ml   Net 1060 ml       Physical Exam  Constitutional:       Appearance: He is obese. He is ill-appearing.   HENT:      Mouth/Throat:      Mouth: Mucous membranes are moist.   Eyes:      Pupils: Pupils are equal, round, and reactive to light.   Cardiovascular:      Rate and Rhythm: Normal rate and regular rhythm.      Heart sounds: No murmur.   Pulmonary:      Effort: Pulmonary effort is normal.      Breath sounds: Decreased breath sounds present. No wheezing.   Abdominal:      General: Bowel sounds are normal.      Palpations: Abdomen is soft. There is mass.      Tenderness: There is no abdominal tenderness.   Musculoskeletal:         General: No swelling.      Cervical back: Neck supple.   Skin:     General: Skin is warm.   Neurological:      Comments: Left hemiparesis      exam limited by morbid obesity  Results Review:    I have reviewed the laboratory and imaging data since the last note by [facility] physician.  My annotations are noted in assessment and plan.    Medication Review:  I have reviewed the current MAR.  My annotations are noted in assessment and plan.       Plan   PCCM Problems  Acute hypoxic respiratory failure; severe and worsening  Noninvasive ventilator use  Acute  metabolic encephalopathy  Concern for aspiration pneumonia  Asthma/COPD exacerbation  Active tobacco abuse  COVID-19 ruled out x2  Relevant Medical Diagnoses  CVA with left hemiparesis  MITZI  Morbid obesity  Chronic benzodiazepine use  Baseline cognitive defect/dementia      THESE ARE NEW MEDICAL PROBLEMS TO ME.    Plan of Treatment  Continue supplemental oxygen with high flow cannula.  Wean as tolerated.    Impressive chest x-ray.  Currently on antibiotics and will need to continue.  I reviewed his CT chest and he has extensive groundglass infiltrates in both lungs.  However also limited by morbid obesity and interpreting the chest film.  So far all infectious markers are negative.      Consideration may be organizing pneumonia.  Already on 40 mg prednisone.  Consider using IV Solu-Medrol at a higher dose.    Apparent exacerbation of COPD/asthma.  Presently not wheezing but limited exam due to morbid obesity.    His recent swallow eval does not confirm the diagnosis of dysphagia and he was permitted regular diet.    Consider bronchoscopy if fails to improve.  However will be a high risk procedure with his morbid obesity.    Rodger Polanco MD  03/06/21  15:58 EST    While in the room and during my examination of the patient I wore gloves, gown, mask, eye protection and followed enhanced droplet/contact isolation protocol and precautions.  I washed my hands before and after this patient encounter.    Part of this note may be an electronic transcription/translation of spoken language to printed text using the Dragon Dictation System.

## 2021-03-06 NOTE — PROGRESS NOTES
Name: Garcia Garcia ADMIT: 3/4/2021   : 1955  PCP: Vargas Ling MD    MRN: [id number] LOS: 2 days   AGE/SEX: 65 y.o. male  ROOM: [facility]     Subjective   Subjective   Feeling better again today. Awake and alert. Eating and drinking well. Mild cough. No SOA at rest. A little tired, but otherwise doing very well. C/o loose stool--incontinent when he coughs. Only 2 episodes so far today. Voiding well today after some issues yesterday afternoon.    Review of Systems   Constitutional: Positive for fatigue. Negative for appetite change, chills, diaphoresis and fever.   HENT: Negative for congestion, ear pain, mouth sores, rhinorrhea, sore throat, trouble swallowing and voice change.    Eyes: Negative for pain and visual disturbance.   Respiratory: Positive for cough. Negative for choking, chest tightness, shortness of breath, wheezing and stridor.    Cardiovascular: Negative for chest pain, palpitations and leg swelling.   Gastrointestinal: Positive for diarrhea (loose stool). Negative for abdominal pain, blood in stool, nausea and vomiting.   Endocrine: Negative for cold intolerance and heat intolerance.   Genitourinary: Negative for decreased urine volume, difficulty urinating, dysuria and hematuria.   Musculoskeletal: Negative for back pain and neck pain.   Skin: Positive for rash. Negative for color change and pallor.   Allergic/Immunologic: Negative for environmental allergies and food allergies.   Neurological: Negative for tremors, seizures, syncope, speech difficulty and headaches.   Hematological: Negative for adenopathy. Does not bruise/bleed easily.   Psychiatric/Behavioral: Negative for behavioral problems, confusion and hallucinations.        Objective   Objective   Vital Signs  Temp:  [97.6 °F (36.4 °C)-97.9 °F (36.6 °C)] 97.8 °F (36.6 °C)  Heart Rate:  [67-98] 74  Resp:  [18-20] 18  BP: ()/(57-87) 118/73  SpO2:  [86 %-98 %] 95 %  on  Flow (L/min):  [3-8] 3;   Device (Oxygen  Therapy): high-flow nasal cannula;humidified  Body mass index is 44.36 kg/m².  Physical Exam  Vitals and nursing note reviewed.   Constitutional:       General: He is not in acute distress.     Appearance: Normal appearance. He is obese. He is ill-appearing (chronically). He is not toxic-appearing or diaphoretic.   HENT:      Head: Normocephalic and atraumatic.      Right Ear: External ear normal.      Left Ear: External ear normal.      Nose: Nose normal.      Mouth/Throat:      Mouth: Mucous membranes are moist.      Pharynx: Oropharynx is clear.   Eyes:      General: No scleral icterus.        Right eye: No discharge.         Left eye: No discharge.      Conjunctiva/sclera: Conjunctivae normal.   Cardiovascular:      Rate and Rhythm: Normal rate and regular rhythm.      Pulses: Normal pulses.   Pulmonary:      Effort: Pulmonary effort is normal. No respiratory distress.      Breath sounds: Normal breath sounds.   Abdominal:      General: Bowel sounds are normal. There is no distension.      Palpations: Abdomen is soft.      Tenderness: There is no abdominal tenderness.      Comments: obese   Musculoskeletal:         General: No swelling or deformity. Normal range of motion.      Cervical back: Neck supple. No rigidity.   Lymphadenopathy:      Cervical: No cervical adenopathy.   Skin:     General: Skin is warm and dry.      Capillary Refill: Capillary refill takes less than 2 seconds.      Coloration: Skin is not jaundiced.      Comments: Scattered areas of what appear to be neurotic excoriation   Neurological:      Mental Status: He is alert. Mental status is at baseline.      Cranial Nerves: No cranial nerve deficit.      Coordination: Coordination normal.      Comments: Oriented to person, place, purpose   Psychiatric:         Mood and Affect: Mood normal.         Behavior: Behavior normal.         Thought Content: Thought content normal.         Results Review     I reviewed the patient's new clinical  results.  Results from last 7 days   Lab Units 03/06/21 0551 03/05/21  0659 03/04/21  0215   WBC 10*3/mm3 11.95* 11.79* 18.79*   HEMOGLOBIN g/dL 13.6 14.4 14.4   PLATELETS 10*3/mm3 293 345 369     Results from last 7 days   Lab Units 03/06/21 0551 03/05/21  0659 03/04/21  0215   SODIUM mmol/L 135* 143 136   POTASSIUM mmol/L 3.4* 3.9 3.5   CHLORIDE mmol/L 99 104 100   CO2 mmol/L 26.2 25.5 22.0   BUN mg/dL 20 24* 18   CREATININE mg/dL 0.70* 0.75* 0.77   GLUCOSE mg/dL 98 135* 136*   Estimated Creatinine Clearance: 139.5 mL/min (A) (by C-G formula based on SCr of 0.7 mg/dL (L)).  Results from last 7 days   Lab Units 03/06/21 0551 03/05/21 0659 03/04/21 0215   ALBUMIN g/dL 2.30* 2.70* 2.40*   BILIRUBIN mg/dL 0.6 0.4 0.8   ALK PHOS U/L 68 74 76   AST (SGOT) U/L 58* 29 42*   ALT (SGPT) U/L 65* 40 49*     Results from last 7 days   Lab Units 03/06/21 0551 03/05/21  0659 03/04/21  0215   CALCIUM mg/dL 8.3* 9.0 8.7   ALBUMIN g/dL 2.30* 2.70* 2.40*   MAGNESIUM mg/dL 1.8 2.2  --      Results from last 7 days   Lab Units 03/05/21  0659 03/04/21  0807 03/04/21  0332 03/04/21  0215   PROCALCITONIN ng/mL 0.10  --   --  0.17   LACTATE mmol/L  --  1.5 2.1*  --      COVID19   Date Value Ref Range Status   03/04/2021 Not Detected Not Detected - Ref. Range Final   03/04/2021 Not Detected Not Detected - Ref. Range Final     Hemoglobin A1C   Date/Time Value Ref Range Status   03/05/2021 0659 6.14 (H) 4.80 - 5.60 % Final     Glucose   Date/Time Value Ref Range Status   03/06/2021 0631 100 70 - 130 mg/dL Final   03/05/2021 2104 155 (H) 70 - 130 mg/dL Final   03/05/2021 1558 196 (H) 70 - 130 mg/dL Final   03/05/2021 1106 149 (H) 70 - 130 mg/dL Final   03/05/2021 0605 121 70 - 130 mg/dL Final   03/04/2021 2035 116 70 - 130 mg/dL Final   03/04/2021 1613 116 70 - 130 mg/dL Final       Duplex Venous Lower Extremity - Bilateral CAR  · Normal bilateral lower extremity venous duplex scan.     XR Chest 1 View  ONE VIEW PORTABLE CHEST AT 2:31  PM     HISTORY: Shortness of breath. Hypoxia.     FINDINGS: The lungs are moderately expanded with prominent haziness  throughout the right lung and at the left base showing marked worsening  from 12 hours ago and most consistent with areas of pneumonia. The  patient had a CT scan performed 11 hours ago showing groundglass opacity  throughout the right lung and scattered in the left lung and raising the  concern of Covid 19 pneumonia. Clinical correlation is therefore  recommended. The heart remains borderline enlarged.     This report was finalized on 3/4/2021 3:04 PM by Dr. Josesito Goldberg M.D.     CT Angiogram Chest  Narrative: Patient: ELMER ROSALES  Time Out: 05:09  Exam(s): CTA CHEST     EXAM:    CT Angiography Chest With Intravenous Contrast    CLINICAL HISTORY:     Reason for exam: r o PE.    TECHNIQUE:    Axial computed tomographic angiography images of the chest with   intravenous contrast.  CTDI is 14.80 mGy and DLP is 613.80 mGy-cm.  This   CT exam was performed according to the principle of ALARA (As Low As   Reasonably Achievable) by using one or more of the following dose   reduction techniques: automated exposure control, adjustment of the mA   and or kV according to patient size, and or use of iterative   reconstruction technique.    MIP reconstructed images were created and reviewed.    COMPARISON:    No relevant prior studies available.    FINDINGS:    Pulmonary arteries:  Unremarkable.  No pulmonary embolism.    Aorta:  No acute findings.  No thoracic aortic aneurysm.    Lungs: Calcified granulomas with calcified subcarinal right hilar lymph   nodes compatible with prior granulomatous disease.  No mass.  Ground-  glass opacification of the right lung left apex with scattered ground-  glass opacification of the superior aspect of the left lower lobe..    Pleural space:  Unremarkable.  No significant effusion.  No   pneumothorax.    Heart:  Unremarkable.  No cardiomegaly.  No significant  pericardial   effusion.  No evidence of RV dysfunction.    Bones joints:  No acute fracture.  No dislocation.    Soft tissues:  Unremarkable.    Lymph nodes:  Unremarkable.  No enlarged lymph nodes.    IMPRESSION:       1. No acute pulmonary embolism.    2. Diffuse ground-glass opacification of the right lung with patchy areas   of glass opacification of the superior left upper lobe and superior   aspect of the left lower lobe which may be due to infection, inhalational   injury, or drug-induced pneumonitis.  Impression: Electronically signed by Harlan Haskins M.D. on 03-04-21 at 0509  XR Spine Lumbar Complete 4+VW  Narrative: Patient: ELMER ROSALES  Time Out: 03:12  Exam(s): FILM L SPINE      EXAM:    XR Lumbosacral Spine, 2 or 3 Views    CLINICAL HISTORY:     Reason for exam: low back pain.    TECHNIQUE:    Frontal and lateral views of the lumbar spine and sacrum.    COMPARISON:    No relevant prior studies available.    FINDINGS:    Vertebrae:  Unremarkable.  No acute fracture.  Normal alignment.    Sacrum coccyx:  Unremarkable as visualized.  No acute fracture.    Disc spaces:  No acute findings.  No significant narrowing.  Small   multilevel osteophytes throughout the thoracic spine.    Soft tissues:  Unremarkable.  Heart vascular calcifications.    IMPRESSION:       No acute fractures or spondylolisthesis of the lumbar spine.  Impression: Electronically signed by Harlan Haskins M.D. on 03-04-21 at 0312  XR Chest 1 View  Narrative: Patient: ELMER ROSALES  Time Out: 02:58  Exam(s): FILM CXR 1 VIEW     EXAM:    XR Chest, 1 View    CLINICAL HISTORY:     Reason for exam: SOA.    TECHNIQUE:    Frontal view of the chest.    COMPARISON:    No relevant prior studies available.    FINDINGS:    Lungs: Low lung volumes and mild perihilar and right upper lobe   reticular opacities.  No consolidation.    Pleural space:  No pleural effusion.  No pneumothorax.    Heart:  Unremarkable.  No cardiomegaly.    Mediastinum:   Unremarkable.    Bones joints:  Unremarkable.    IMPRESSION:       Low lung volumes and mild perihilar and right upper lobe reticular   opacities which may be due to edema or infection.   Impression: Electronically signed by Harlan Haskins M.D. on 03-04-21 at 0258    Scheduled Medications  allopurinol, 200 mg, Oral, Daily  aspirin, 81 mg, Oral, Daily  atorvastatin, 20 mg, Oral, Nightly  budesonide-formoterol, 2 puff, Inhalation, BID - RT  buPROPion SR, 450 mg, Oral, Daily  busPIRone, 15 mg, Oral, TID  clopidogrel, 75 mg, Oral, Daily  divalproex, 250 mg, Oral, BID  enoxaparin, 40 mg, Subcutaneous, Q12H  hydrocortisone-bacitracin-zinc oxide-nystatin, 1 application, Topical, BID  ipratropium-albuterol, 3 mL, Nebulization, 4x Daily - RT  levothyroxine, 25 mcg, Oral, Q AM  lisinopril, 2.5 mg, Oral, Daily  metoprolol tartrate, 50 mg, Oral, BID  montelukast, 10 mg, Oral, Nightly  nystatin, , Topical, Q12H  pantoprazole, 40 mg, Oral, Daily  piperacillin-tazobactam, 3.375 g, Intravenous, Q8H  predniSONE, 40 mg, Oral, Daily With Breakfast  vitamin D, 50,000 Units, Oral, Q7 Days    Infusions   Diet  Diet Regular; Cardiac, Consistent Carbohydrate       Assessment/Plan     Active Hospital Problems    Diagnosis  POA   • **Pneumonia due to infectious organism [J18.9]  Yes   • Hypokalemia [E87.6]  No   • Vitamin D deficiency [E55.9]  Yes   • MITZI (obstructive sleep apnea) [G47.33]  Yes   • Severe depression (CMS/HCC) [F32.2]  Yes   • HLD (hyperlipidemia) [E78.5]  Yes   • HTN (hypertension) [I10]  Yes   • History of stroke [Z86.73]  Not Applicable   • GERD without esophagitis [K21.9]  Yes   • Diastolic CHF, chronic (CMS/HCC) [I50.32]  Yes   • COPD (chronic obstructive pulmonary disease) (CMS/HCC) [J44.9]  Yes   • Dementia (CMS/HCC) [F03.90]  Yes   • Lactic acidosis [E87.2]  Yes   • Leukocytosis [D72.829]  Yes   • Elevated d-dimer [R79.89]  Yes   • Type 2 diabetes mellitus with diabetic neuropathy, unspecified (CMS/HCC) [E11.40]   Yes   • Acute respiratory failure with hypoxia (CMS/[facility]) [J96.01]  Yes      Resolved Hospital Problems   No resolved problems to display.       64yo gentleman sent from NH with SOA and hypoxia. Suffers from dementia and likely some metabolic encephalopathy on top of that due to infection. Admitted here with concern for recurrent PNA. Was just admitted to another facility for PNA with sepsis.     Suspect recurrent bacterial PNA with acute hypoxic resp failure, bilateral infiltrates on CT, and elevated WBC, aspiration PNA is concern  Continue IV Zosyn, dc'd Vanc as MRSA screen negative  Cultures all neg so far, COViD neg  WBC stable around 11 today  Afebrile, no hypotension or tachycardia  IS and OPEP ordered  Marked worsening of resp failure late 3/4, but much improved now, was up to 10L/min highflow at one point and required NIPPV overnight, last night had similar situation--required 8L/min and NIPPV, but O2 pretty stable again today on only 3L/min  Steroids added for smoking/COPD history  Appreciate Pulm attention to pt    SLP onel very reassuring (now that he is more awake/alert), regular diet in place with swallow precautions, restarted home meds, though lower dose of benzo and depakote    DDimer elevated, no PE on CTA, venous u/s of legs neg for DVT    Stool loose but not frequent, abd exam benign, WBC not impressive, continue to monitor as he is at risk for CDiff given amount of abx he's received recently    A1c only 6.14, continue to monitor accuchecks, sugars okay again today despite steroids    Vitamin D very low, started po replacement    K+ low today, will replace orally, Mg++ level fine    LFTs up slightly, continue to monitor    Has h/o CVA with mild residual non-dominant left hemiparesis, at baseline      · Lovenox 40mg BID for DVT prophylaxis.  · Full code.  · Discussed with patient and nursing staff.  · Anticipate discharge back to SNU facility, PT onel noted,  timing yet to be determined.      Malcolm  CONSUELO Hillman MD  [facility]  03/06/21  10:55 EST

## 2021-03-06 NOTE — SIGNIFICANT NOTE
03/06/21 1543   OTHER   Discipline physical therapy assistant   Rehab Time/Intention   Session Not Performed other (see comments)  (Pt is awaiting a bath due to a BM)   Recommendation   PT - Next Appointment 03/07/21

## 2021-03-07 ENCOUNTER — APPOINTMENT (OUTPATIENT)
Dept: CT IMAGING | Facility: HOSPITAL | Age: 66
End: 2021-03-07

## 2021-03-07 LAB
ALBUMIN SERPL-MCNC: 2.3 G/DL (ref 3.5–5.2)
ALBUMIN/GLOB SERPL: 0.7 G/DL
ALP SERPL-CCNC: 64 U/L (ref 39–117)
ALT SERPL W P-5'-P-CCNC: 55 U/L (ref 1–41)
ANION GAP SERPL CALCULATED.3IONS-SCNC: 8.6 MMOL/L (ref 5–15)
AST SERPL-CCNC: 32 U/L (ref 1–40)
BASOPHILS # BLD AUTO: 0.01 10*3/MM3 (ref 0–0.2)
BASOPHILS NFR BLD AUTO: 0.1 % (ref 0–1.5)
BILIRUB SERPL-MCNC: 0.4 MG/DL (ref 0–1.2)
BUN SERPL-MCNC: 13 MG/DL (ref 8–23)
BUN/CREAT SERPL: 22.4 (ref 7–25)
CALCIUM SPEC-SCNC: 8 MG/DL (ref 8.6–10.5)
CHLORIDE SERPL-SCNC: 99 MMOL/L (ref 98–107)
CO2 SERPL-SCNC: 26.4 MMOL/L (ref 22–29)
CREAT SERPL-MCNC: 0.58 MG/DL (ref 0.76–1.27)
DEPRECATED RDW RBC AUTO: 45 FL (ref 37–54)
EOSINOPHIL # BLD AUTO: 0.02 10*3/MM3 (ref 0–0.4)
EOSINOPHIL NFR BLD AUTO: 0.2 % (ref 0.3–6.2)
ERYTHROCYTE [DISTWIDTH] IN BLOOD BY AUTOMATED COUNT: 15.3 % (ref 12.3–15.4)
GFR SERPL CREATININE-BSD FRML MDRD: 142 ML/MIN/1.73
GLOBULIN UR ELPH-MCNC: 3.3 GM/DL
GLUCOSE BLDC GLUCOMTR-MCNC: 138 MG/DL (ref 70–130)
GLUCOSE BLDC GLUCOMTR-MCNC: 95 MG/DL (ref 70–130)
GLUCOSE BLDC GLUCOMTR-MCNC: 99 MG/DL (ref 70–130)
GLUCOSE SERPL-MCNC: 91 MG/DL (ref 65–99)
HCT VFR BLD AUTO: 39.1 % (ref 37.5–51)
HGB BLD-MCNC: 13.4 G/DL (ref 13–17.7)
IMM GRANULOCYTES # BLD AUTO: 0.16 10*3/MM3 (ref 0–0.05)
IMM GRANULOCYTES NFR BLD AUTO: 1.5 % (ref 0–0.5)
LYMPHOCYTES # BLD AUTO: 1.65 10*3/MM3 (ref 0.7–3.1)
LYMPHOCYTES NFR BLD AUTO: 15.6 % (ref 19.6–45.3)
MAGNESIUM SERPL-MCNC: 1.7 MG/DL (ref 1.6–2.4)
MCH RBC QN AUTO: 28.2 PG (ref 26.6–33)
MCHC RBC AUTO-ENTMCNC: 34.3 G/DL (ref 31.5–35.7)
MCV RBC AUTO: 82.3 FL (ref 79–97)
MONOCYTES # BLD AUTO: 0.79 10*3/MM3 (ref 0.1–0.9)
MONOCYTES NFR BLD AUTO: 7.5 % (ref 5–12)
NEUTROPHILS NFR BLD AUTO: 7.96 10*3/MM3 (ref 1.7–7)
NEUTROPHILS NFR BLD AUTO: 75.1 % (ref 42.7–76)
NRBC BLD AUTO-RTO: 0 /100 WBC (ref 0–0.2)
PLATELET # BLD AUTO: 259 10*3/MM3 (ref 140–450)
PMV BLD AUTO: 9.3 FL (ref 6–12)
POTASSIUM SERPL-SCNC: 3.6 MMOL/L (ref 3.5–5.2)
PROT SERPL-MCNC: 5.6 G/DL (ref 6–8.5)
RBC # BLD AUTO: 4.75 10*6/MM3 (ref 4.14–5.8)
SODIUM SERPL-SCNC: 134 MMOL/L (ref 136–145)
WBC # BLD AUTO: 10.59 10*3/MM3 (ref 3.4–10.8)

## 2021-03-07 PROCEDURE — 94799 UNLISTED PULMONARY SVC/PX: CPT

## 2021-03-07 PROCEDURE — 25010000002 PIPERACILLIN SOD-TAZOBACTAM PER 1 G: Performed by: HOSPITALIST

## 2021-03-07 PROCEDURE — 70450 CT HEAD/BRAIN W/O DYE: CPT

## 2021-03-07 PROCEDURE — 85025 COMPLETE CBC W/AUTO DIFF WBC: CPT | Performed by: HOSPITALIST

## 2021-03-07 PROCEDURE — 63710000001 PREDNISONE PER 1 MG: Performed by: INTERNAL MEDICINE

## 2021-03-07 PROCEDURE — 82962 GLUCOSE BLOOD TEST: CPT

## 2021-03-07 PROCEDURE — 25010000002 ENOXAPARIN PER 10 MG: Performed by: HOSPITALIST

## 2021-03-07 PROCEDURE — 83735 ASSAY OF MAGNESIUM: CPT | Performed by: HOSPITALIST

## 2021-03-07 PROCEDURE — 80053 COMPREHEN METABOLIC PANEL: CPT | Performed by: HOSPITALIST

## 2021-03-07 PROCEDURE — 94660 CPAP INITIATION&MGMT: CPT

## 2021-03-07 RX ADMIN — METOPROLOL TARTRATE 50 MG: 50 TABLET, FILM COATED ORAL at 09:44

## 2021-03-07 RX ADMIN — PANTOPRAZOLE SODIUM 40 MG: 40 TABLET, DELAYED RELEASE ORAL at 09:45

## 2021-03-07 RX ADMIN — ENOXAPARIN SODIUM 40 MG: 40 INJECTION SUBCUTANEOUS at 09:45

## 2021-03-07 RX ADMIN — BUDESONIDE AND FORMOTEROL FUMARATE DIHYDRATE 2 PUFF: 80; 4.5 AEROSOL RESPIRATORY (INHALATION) at 19:29

## 2021-03-07 RX ADMIN — Medication 1 APPLICATION: at 09:46

## 2021-03-07 RX ADMIN — DIVALPROEX SODIUM 250 MG: 250 TABLET, DELAYED RELEASE ORAL at 09:45

## 2021-03-07 RX ADMIN — NYSTATIN: 100000 POWDER TOPICAL at 09:46

## 2021-03-07 RX ADMIN — ASPIRIN 81 MG: 81 TABLET, COATED ORAL at 09:44

## 2021-03-07 RX ADMIN — IPRATROPIUM BROMIDE AND ALBUTEROL SULFATE 3 ML: 2.5; .5 SOLUTION RESPIRATORY (INHALATION) at 00:00

## 2021-03-07 RX ADMIN — BUPROPION HYDROCHLORIDE 450 MG: 150 TABLET, EXTENDED RELEASE ORAL at 09:44

## 2021-03-07 RX ADMIN — Medication 1 APPLICATION: at 20:14

## 2021-03-07 RX ADMIN — IPRATROPIUM BROMIDE AND ALBUTEROL SULFATE 3 ML: 2.5; .5 SOLUTION RESPIRATORY (INHALATION) at 15:23

## 2021-03-07 RX ADMIN — CLOPIDOGREL 75 MG: 75 TABLET, FILM COATED ORAL at 09:44

## 2021-03-07 RX ADMIN — ALLOPURINOL 200 MG: 100 TABLET ORAL at 09:44

## 2021-03-07 RX ADMIN — TAZOBACTAM SODIUM AND PIPERACILLIN SODIUM 3.38 G: 375; 3 INJECTION, SOLUTION INTRAVENOUS at 06:33

## 2021-03-07 RX ADMIN — LEVOTHYROXINE SODIUM 25 MCG: 0.03 TABLET ORAL at 06:33

## 2021-03-07 RX ADMIN — BUSPIRONE HYDROCHLORIDE 15 MG: 15 TABLET ORAL at 09:45

## 2021-03-07 RX ADMIN — LISINOPRIL 2.5 MG: 2.5 TABLET ORAL at 09:44

## 2021-03-07 RX ADMIN — NYSTATIN: 100000 POWDER TOPICAL at 20:14

## 2021-03-07 RX ADMIN — IPRATROPIUM BROMIDE AND ALBUTEROL SULFATE 3 ML: 2.5; .5 SOLUTION RESPIRATORY (INHALATION) at 11:20

## 2021-03-07 RX ADMIN — BUDESONIDE AND FORMOTEROL FUMARATE DIHYDRATE 2 PUFF: 80; 4.5 AEROSOL RESPIRATORY (INHALATION) at 08:15

## 2021-03-07 RX ADMIN — BUSPIRONE HYDROCHLORIDE 15 MG: 15 TABLET ORAL at 15:02

## 2021-03-07 RX ADMIN — PREDNISONE 40 MG: 20 TABLET ORAL at 09:44

## 2021-03-07 RX ADMIN — ENOXAPARIN SODIUM 40 MG: 40 INJECTION SUBCUTANEOUS at 20:23

## 2021-03-07 RX ADMIN — TAZOBACTAM SODIUM AND PIPERACILLIN SODIUM 3.38 G: 375; 3 INJECTION, SOLUTION INTRAVENOUS at 15:02

## 2021-03-07 NOTE — PLAN OF CARE
Problem: Adult Inpatient Plan of Care  Goal: Patient-Specific Goal (Individualized)  Outcome: Ongoing, Progressing     Problem: Obstructive Sleep Apnea Risk or Actual (Comorbidity Management)  Goal: Unobstructed Breathing During Sleep  Outcome: Ongoing, Progressing  Intervention: Monitor and Manage Obstructive Sleep Apnea    Problem: Infection (Pneumonia)  Goal: Resolution of Infection Signs and Symptoms  Outcome: Ongoing, Progressing     Problem: Respiratory Compromise (Pneumonia)  Goal: Effective Oxygenation and Ventilation  Outcome: Ongoing, Progressing  Intervention: Optimize Oxygenation and Ventilation    Goal Outcome Evaluation:  Plan of Care Reviewed With: patient  Progress: improving  Outcome Summary: 2L nc. Does not tolerate bipap well while asleep due to hair and beard, but holds sats well on nasal cannula. Frequent liquid stools past 2 nights, I sent sample to lab which was negative for cdiff. He is able to get up to the bsc with 1-2 assist and use the urinal in bed, he just needs encouragment or he will go in his brief. VSS. No complaints. Occasionally forgetful, but alert and oriented now, which is much improved from 2 nights ago. juliann.

## 2021-03-07 NOTE — PROGRESS NOTES
Name: Garcia Garcia ADMIT: 3/4/2021   : 1955  PCP: Vargas Ling MD    MRN: [id number] LOS: 3 days   AGE/SEX: 65 y.o. male  ROOM: [facility]     Subjective   Subjective    C/o being lightheaded when upright. C/o some back pain from laying in bed. Eating and drinking well. Mild cough. No SOA at rest. A little tired, but otherwise doing very well. Diarrhea improved today per RN. Voiding well today.    Review of Systems   Constitutional: Positive for fatigue. Negative for appetite change, chills, diaphoresis and fever.   HENT: Negative for congestion, ear pain, mouth sores, rhinorrhea, sore throat, trouble swallowing and voice change.    Eyes: Negative for pain and visual disturbance.   Respiratory: Positive for cough. Negative for choking, chest tightness, shortness of breath, wheezing and stridor.    Cardiovascular: Negative for chest pain, palpitations and leg swelling.   Gastrointestinal: Positive for diarrhea (loose stool). Negative for abdominal pain, blood in stool, nausea and vomiting.   Endocrine: Negative for cold intolerance and heat intolerance.   Genitourinary: Negative for decreased urine volume, difficulty urinating, dysuria and hematuria.   Musculoskeletal: Positive for back pain. Negative for neck pain.   Skin: Positive for rash. Negative for color change and pallor.   Allergic/Immunologic: Negative for environmental allergies and food allergies.   Neurological: Positive for light-headedness. Negative for tremors, seizures, syncope, speech difficulty and headaches.   Hematological: Negative for adenopathy. Does not bruise/bleed easily.   Psychiatric/Behavioral: Negative for behavioral problems, confusion and hallucinations.        Objective   Objective   Vital Signs  Temp:  [97.7 °F (36.5 °C)] 97.7 °F (36.5 °C)  Heart Rate:  [] 100  Resp:  [16-18] 18  BP: (118-135)/(60-68) 135/68  SpO2:  [92 %-93 %] 92 %  on  Flow (L/min):  [1-4] 3;   Device (Oxygen Therapy): nasal cannula  Body  mass index is 44.36 kg/m².  Physical Exam  Vitals and nursing note reviewed.   Constitutional:       General: He is not in acute distress.     Appearance: Normal appearance. He is obese. He is ill-appearing (chronically). He is not toxic-appearing or diaphoretic.   HENT:      Head: Normocephalic and atraumatic.      Right Ear: External ear normal.      Left Ear: External ear normal.      Nose: Nose normal.      Mouth/Throat:      Mouth: Mucous membranes are moist.      Pharynx: Oropharynx is clear.   Eyes:      General: No scleral icterus.        Right eye: No discharge.         Left eye: No discharge.      Conjunctiva/sclera: Conjunctivae normal.   Cardiovascular:      Rate and Rhythm: Normal rate and regular rhythm.      Pulses: Normal pulses.   Pulmonary:      Effort: Pulmonary effort is normal. No respiratory distress.      Breath sounds: Normal breath sounds.      Comments: BS decreased due to body habitus  Abdominal:      General: Bowel sounds are normal. There is no distension.      Palpations: Abdomen is soft.      Tenderness: There is no abdominal tenderness.      Comments: obese   Musculoskeletal:         General: No swelling or deformity. Normal range of motion.      Cervical back: Neck supple. No rigidity.   Lymphadenopathy:      Cervical: No cervical adenopathy.   Skin:     General: Skin is warm and dry.      Capillary Refill: Capillary refill takes less than 2 seconds.      Coloration: Skin is not jaundiced.      Comments: Scattered areas of what appear to be neurotic excoriation   Neurological:      Mental Status: He is alert. Mental status is at baseline.      Cranial Nerves: No cranial nerve deficit.      Coordination: Coordination normal.      Comments: Oriented to person, place, purpose   Psychiatric:         Mood and Affect: Mood normal.         Behavior: Behavior normal.         Thought Content: Thought content normal.         Results Review     I reviewed the patient's new clinical  results.  Results from last 7 days   Lab Units 03/07/21  0550 03/06/21  0551 03/05/21 0659 03/04/21 0215   WBC 10*3/mm3 10.59 11.95* 11.79* 18.79*   HEMOGLOBIN g/dL 13.4 13.6 14.4 14.4   PLATELETS 10*3/mm3 259 293 345 369     Results from last 7 days   Lab Units 03/07/21  0550 03/06/21  0551 03/05/21 0659 03/04/21 0215   SODIUM mmol/L 134* 135* 143 136   POTASSIUM mmol/L 3.6 3.4* 3.9 3.5   CHLORIDE mmol/L 99 99 104 100   CO2 mmol/L 26.4 26.2 25.5 22.0   BUN mg/dL 13 20 24* 18   CREATININE mg/dL 0.58* 0.70* 0.75* 0.77   GLUCOSE mg/dL 91 98 135* 136*   Estimated Creatinine Clearance: 168.3 mL/min (A) (by C-G formula based on SCr of 0.58 mg/dL (L)).  Results from last 7 days   Lab Units 03/07/21  0550 03/06/21  0551 03/05/21 0659 03/04/21 0215   ALBUMIN g/dL 2.30* 2.30* 2.70* 2.40*   BILIRUBIN mg/dL 0.4 0.6 0.4 0.8   ALK PHOS U/L 64 68 74 76   AST (SGOT) U/L 32 58* 29 42*   ALT (SGPT) U/L 55* 65* 40 49*     Results from last 7 days   Lab Units 03/07/21  0550 03/06/21  0551 03/05/21 0659 03/04/21 0215   CALCIUM mg/dL 8.0* 8.3* 9.0 8.7   ALBUMIN g/dL 2.30* 2.30* 2.70* 2.40*   MAGNESIUM mg/dL 1.7 1.8 2.2  --      Results from last 7 days   Lab Units 03/05/21  0659 03/04/21  0807 03/04/21  0332 03/04/21  0215   PROCALCITONIN ng/mL 0.10  --   --  0.17   LACTATE mmol/L  --  1.5 2.1*  --      COVID19   Date Value Ref Range Status   03/04/2021 Not Detected Not Detected - Ref. Range Final   03/04/2021 Not Detected Not Detected - Ref. Range Final     Hemoglobin A1C   Date/Time Value Ref Range Status   03/05/2021 0659 6.14 (H) 4.80 - 5.60 % Final     Glucose   Date/Time Value Ref Range Status   03/07/2021 1052 99 70 - 130 mg/dL Final   03/07/2021 0636 95 70 - 130 mg/dL Final   03/06/2021 2049 148 (H) 70 - 130 mg/dL Final   03/06/2021 1548 151 (H) 70 - 130 mg/dL Final   03/06/2021 1111 91 70 - 130 mg/dL Final   03/06/2021 0631 100 70 - 130 mg/dL Final   03/05/2021 2104 155 (H) 70 - 130 mg/dL Final       Duplex Venous Lower  Extremity - Bilateral CAR  · Normal bilateral lower extremity venous duplex scan.     XR Chest 1 View  ONE VIEW PORTABLE CHEST AT 2:31 PM     HISTORY: Shortness of breath. Hypoxia.     FINDINGS: The lungs are moderately expanded with prominent haziness  throughout the right lung and at the left base showing marked worsening  from 12 hours ago and most consistent with areas of pneumonia. The  patient had a CT scan performed 11 hours ago showing groundglass opacity  throughout the right lung and scattered in the left lung and raising the  concern of Covid 19 pneumonia. Clinical correlation is therefore  recommended. The heart remains borderline enlarged.     This report was finalized on 3/4/2021 3:04 PM by Dr. Josesito Goldberg M.D.     CT Angiogram Chest  Narrative: Patient: ELMER ROSALES  Time Out: 05:09  Exam(s): CTA CHEST     EXAM:    CT Angiography Chest With Intravenous Contrast    CLINICAL HISTORY:     Reason for exam: r o PE.    TECHNIQUE:    Axial computed tomographic angiography images of the chest with   intravenous contrast.  CTDI is 14.80 mGy and DLP is 613.80 mGy-cm.  This   CT exam was performed according to the principle of ALARA (As Low As   Reasonably Achievable) by using one or more of the following dose   reduction techniques: automated exposure control, adjustment of the mA   and or kV according to patient size, and or use of iterative   reconstruction technique.    MIP reconstructed images were created and reviewed.    COMPARISON:    No relevant prior studies available.    FINDINGS:    Pulmonary arteries:  Unremarkable.  No pulmonary embolism.    Aorta:  No acute findings.  No thoracic aortic aneurysm.    Lungs: Calcified granulomas with calcified subcarinal right hilar lymph   nodes compatible with prior granulomatous disease.  No mass.  Ground-  glass opacification of the right lung left apex with scattered ground-  glass opacification of the superior aspect of the left lower lobe..    Pleural  space:  Unremarkable.  No significant effusion.  No   pneumothorax.    Heart:  Unremarkable.  No cardiomegaly.  No significant pericardial   effusion.  No evidence of RV dysfunction.    Bones joints:  No acute fracture.  No dislocation.    Soft tissues:  Unremarkable.    Lymph nodes:  Unremarkable.  No enlarged lymph nodes.    IMPRESSION:       1. No acute pulmonary embolism.    2. Diffuse ground-glass opacification of the right lung with patchy areas   of glass opacification of the superior left upper lobe and superior   aspect of the left lower lobe which may be due to infection, inhalational   injury, or drug-induced pneumonitis.  Impression: Electronically signed by Harlan Haskins M.D. on 03-04-21 at 0509  XR Spine Lumbar Complete 4+VW  Narrative: Patient: ELMER ROSALES  Time Out: 03:12  Exam(s): FILM L SPINE      EXAM:    XR Lumbosacral Spine, 2 or 3 Views    CLINICAL HISTORY:     Reason for exam: low back pain.    TECHNIQUE:    Frontal and lateral views of the lumbar spine and sacrum.    COMPARISON:    No relevant prior studies available.    FINDINGS:    Vertebrae:  Unremarkable.  No acute fracture.  Normal alignment.    Sacrum coccyx:  Unremarkable as visualized.  No acute fracture.    Disc spaces:  No acute findings.  No significant narrowing.  Small   multilevel osteophytes throughout the thoracic spine.    Soft tissues:  Unremarkable.  Heart vascular calcifications.    IMPRESSION:       No acute fractures or spondylolisthesis of the lumbar spine.  Impression: Electronically signed by Harlan Haskins M.D. on 03-04-21 at 0312  XR Chest 1 View  Narrative: Patient: ELMER ROSALES  Time Out: 02:58  Exam(s): FILM CXR 1 VIEW     EXAM:    XR Chest, 1 View    CLINICAL HISTORY:     Reason for exam: SOA.    TECHNIQUE:    Frontal view of the chest.    COMPARISON:    No relevant prior studies available.    FINDINGS:    Lungs: Low lung volumes and mild perihilar and right upper lobe   reticular opacities.  No  consolidation.    Pleural space:  No pleural effusion.  No pneumothorax.    Heart:  Unremarkable.  No cardiomegaly.    Mediastinum:  Unremarkable.    Bones joints:  Unremarkable.    IMPRESSION:       Low lung volumes and mild perihilar and right upper lobe reticular   opacities which may be due to edema or infection.   Impression: Electronically signed by Harlan Haskins M.D. on 03-04-21 at 0258    Scheduled Medications  allopurinol, 200 mg, Oral, Daily  aspirin, 81 mg, Oral, Daily  atorvastatin, 20 mg, Oral, Nightly  budesonide-formoterol, 2 puff, Inhalation, BID - RT  buPROPion SR, 450 mg, Oral, Daily  busPIRone, 15 mg, Oral, TID  clopidogrel, 75 mg, Oral, Daily  divalproex, 250 mg, Oral, BID  enoxaparin, 40 mg, Subcutaneous, Q12H  hydrocortisone-bacitracin-zinc oxide-nystatin, 1 application, Topical, BID  ipratropium-albuterol, 3 mL, Nebulization, 4x Daily - RT  levothyroxine, 25 mcg, Oral, Q AM  lisinopril, 2.5 mg, Oral, Daily  metoprolol tartrate, 50 mg, Oral, BID  montelukast, 10 mg, Oral, Nightly  nystatin, , Topical, Q12H  pantoprazole, 40 mg, Oral, Daily  piperacillin-tazobactam, 3.375 g, Intravenous, Q8H  predniSONE, 40 mg, Oral, Daily With Breakfast  vitamin D, 50,000 Units, Oral, Q7 Days    Infusions   Diet  Diet Regular; Cardiac, Consistent Carbohydrate       Assessment/Plan     Active Hospital Problems    Diagnosis  POA   • **Pneumonia due to infectious organism [J18.9]  Yes   • Hypokalemia [E87.6]  No   • Vitamin D deficiency [E55.9]  Yes   • MITZI (obstructive sleep apnea) [G47.33]  Yes   • Severe depression (CMS/HCC) [F32.2]  Yes   • HLD (hyperlipidemia) [E78.5]  Yes   • HTN (hypertension) [I10]  Yes   • History of stroke [Z86.73]  Not Applicable   • GERD without esophagitis [K21.9]  Yes   • Diastolic CHF, chronic (CMS/HCC) [I50.32]  Yes   • COPD (chronic obstructive pulmonary disease) (CMS/HCC) [J44.9]  Yes   • Dementia (CMS/HCC) [F03.90]  Yes   • Lactic acidosis [E87.2]  Yes   • Leukocytosis  [D72.829]  Yes   • Elevated d-dimer [R79.89]  Yes   • Type 2 diabetes mellitus with diabetic neuropathy, unspecified (CMS/HCC) [E11.40]  Yes   • Acute respiratory failure with hypoxia (CMS/[facility]) [J96.01]  Yes      Resolved Hospital Problems   No resolved problems to display.       64yo gentleman sent from NH with SOA and hypoxia. Suffers from dementia and likely some metabolic encephalopathy on top of that due to infection. Admitted here with concern for recurrent PNA. Was just admitted to another facility for PNA with sepsis.     Suspect recurrent bacterial PNA: acute hypoxic resp failure, bilateral infiltrates on CT, and elevated WBC--aspiration PNA is concern  Continue IV Zosyn, dc'd Vanc as MRSA screen negative  Cultures all neg so far, COViD neg  WBC wnl today  Afebrile, no hypotension or tachycardia  IS and OPEP in use  O2 pretty stable again today on only 3L/min  Steroids (prednisone) added for smoking/COPD history  Appreciate Pulm attention to pt    Lightheaded when upright, check orthostatic BPs, HR is fine and BP currently 140/100, BCR <2sec    SLP eval very reassuring (now that he is more awake/alert), regular diet in place with swallow precautions, restarted home meds, though lower dose of benzo and depakote    DDimer elevated, no PE on CTA, venous u/s of legs neg for DVT    Stool loose but not frequent, abd exam benign, WBC not impressive, CDiff negative, continue to monitor as he is at risk for CDiff given amount of abx he's received recently    A1c only 6.14, continue to monitor accuchecks, sugars okay again today despite steroids    Vitamin D very low, started po replacement    K+ wnl today, replacing orally as needed, Mg++ level fine    LFTs better today, continue to monitor    Has h/o CVA with mild residual non-dominant left hemiparesis, at baseline      · Lovenox 40mg BID for DVT prophylaxis.  · Full code.  · Discussed with patient and nursing staff.  · Anticipate discharge back to SNU facility, PT  onel noted,  timing yet to be determined.      Malcolm Hillman MD  [facility]  03/07/21  13:11 EST

## 2021-03-07 NOTE — SIGNIFICANT NOTE
"CNA called for help in patient room.  Pt was found on bathroom floor unclothed. Pt was bleeding from IV site and abrasion on R elbow.  IV was pulled out and on floor.  No other trauma was found, no AMS.  Pt stated he \"was trying to get to the bathroom to have a bowel movement\".  Pt claims to have felt lightheadedness as he proceeded to the bathroom. Pt was moved to sitting position and raised with assistance to chair.  Chair was then scooted bedside to assist patient back into bed.  All VSS.  MD notified. STAT CT ordered.      "

## 2021-03-07 NOTE — SIGNIFICANT NOTE
03/07/21 1516   OTHER   Discipline physical therapist   Rehab Time/Intention   Session Not Performed patient/family declined treatment  (pt declined PT this date stating he had a HA and felt nauseated. Encouraged pt to participate in therex but he declined. Family member present in room.)   Recommendation   PT - Next Appointment 03/08/21

## 2021-03-07 NOTE — PROGRESS NOTES
[facility]  [phone]  Rodger Polanco MD    Subjective:  LOS: 3    Feels better today. On low flow O2. Denies cough or wheezing.    Objective   Vital Signs past 24hrs    Temp range: Temp (24hrs), Av.3 °F (36.3 °C), Min:96.5 °F (35.8 °C), Max:97.7 °F (36.5 °C)    BP range: BP: (118-141)/() 141/105  Pulse range: Heart Rate:  [] 70  Resp rate range: Resp:  [16-18] 18    Device (Oxygen Therapy): nasal cannulaFlow (L/min):  [1-4] 3  Oxygen range:SpO2:  [92 %-93 %] 93 %      129 kg (283 lb 4.7 oz); Body mass index is 44.36 kg/m².    Intake/Output Summary (Last 24 hours) at 3/7/2021 1406  Last data filed at 3/7/2021 1300  Gross per 24 hour   Intake 720 ml   Output 850 ml   Net -130 ml       Physical Exam  Constitutional:       Appearance: He is obese.   HENT:      Mouth/Throat:      Mouth: Mucous membranes are moist.   Eyes:      Pupils: Pupils are equal, round, and reactive to light.   Cardiovascular:      Rate and Rhythm: Normal rate and regular rhythm.      Heart sounds: No murmur.   Pulmonary:      Effort: Pulmonary effort is normal.      Breath sounds: Decreased breath sounds present. No wheezing.   Abdominal:      General: Bowel sounds are normal.      Palpations: Abdomen is soft. There is no mass.      Tenderness: There is no abdominal tenderness.   Musculoskeletal:         General: No swelling.      Cervical back: Neck supple.   Skin:     General: Skin is warm.   Neurological:      Comments: Left hemiparesis      exam limited by morbid obesity  Results Review:    I have reviewed the laboratory and imaging data since the last note by [facility] physician.  My annotations are noted in assessment and plan.    Medication Review:  I have reviewed the current MAR.  My annotations are noted in assessment and plan.       Plan   PCCM Problems  Acute hypoxic respiratory failure; severe and worsening  Noninvasive ventilator use  Acute metabolic encephalopathy  Concern for aspiration  pneumonia  Asthma/COPD exacerbation  Active tobacco abuse  COVID-19 ruled out x2  Relevant Medical Diagnoses  CVA with left hemiparesis  MITZI  Morbid obesity  Chronic benzodiazepine use  Baseline cognitive defect/dementia        Plan of Treatment  Continue supplemental oxygen and wean as tolerated. Now on regular NC.    Impressive chest x-ray.  Currently on antibiotics and will need to continue.  I reviewed his CT chest and he has extensive groundglass infiltrates in both lungs.  However also limited by morbid obesity and interpreting the chest film.  So far all infectious markers are negative.      Consideration may be organizing pneumonia.  Already on 40 mg prednisone.  Consider using IV Solu-Medrol at a higher dose. However appears clinically better at this time.    Apparent exacerbation of COPD/asthma.  Presently not wheezing but limited exam due to morbid obesity.    His recent swallow eval does not confirm the diagnosis of dysphagia and he was permitted regular diet.    Consider bronchoscopy if fails to improve.  However will be a high risk procedure with his morbid obesity.    Chest x-ray tomorrow.    Rodger Polanco MD  03/07/21  14:06 EST    While in the room and during my examination of the patient I wore gloves, gown, mask, eye protection and followed enhanced droplet/contact isolation protocol and precautions.  I washed my hands before and after this patient encounter.    Part of this note may be an electronic transcription/translation of spoken language to printed text using the Dragon Dictation System.

## 2021-03-08 ENCOUNTER — APPOINTMENT (OUTPATIENT)
Dept: GENERAL RADIOLOGY | Facility: HOSPITAL | Age: 66
End: 2021-03-08

## 2021-03-08 PROBLEM — I95.1 ORTHOSTATIC HYPOTENSION: Status: ACTIVE | Noted: 2021-03-08

## 2021-03-08 LAB
ALBUMIN SERPL-MCNC: 2.7 G/DL (ref 3.5–5.2)
ALBUMIN/GLOB SERPL: 0.9 G/DL
ALP SERPL-CCNC: 62 U/L (ref 39–117)
ALT SERPL W P-5'-P-CCNC: 56 U/L (ref 1–41)
ANION GAP SERPL CALCULATED.3IONS-SCNC: 10.1 MMOL/L (ref 5–15)
AST SERPL-CCNC: 33 U/L (ref 1–40)
BASOPHILS # BLD AUTO: 0.03 10*3/MM3 (ref 0–0.2)
BASOPHILS NFR BLD AUTO: 0.3 % (ref 0–1.5)
BILIRUB SERPL-MCNC: 0.3 MG/DL (ref 0–1.2)
BUN SERPL-MCNC: 9 MG/DL (ref 8–23)
BUN/CREAT SERPL: 15.5 (ref 7–25)
CALCIUM SPEC-SCNC: 7.9 MG/DL (ref 8.6–10.5)
CHLORIDE SERPL-SCNC: 100 MMOL/L (ref 98–107)
CO2 SERPL-SCNC: 27.9 MMOL/L (ref 22–29)
CREAT SERPL-MCNC: 0.58 MG/DL (ref 0.76–1.27)
DEPRECATED RDW RBC AUTO: 43.3 FL (ref 37–54)
EOSINOPHIL # BLD AUTO: 0.04 10*3/MM3 (ref 0–0.4)
EOSINOPHIL NFR BLD AUTO: 0.4 % (ref 0.3–6.2)
ERYTHROCYTE [DISTWIDTH] IN BLOOD BY AUTOMATED COUNT: 15.1 % (ref 12.3–15.4)
GFR SERPL CREATININE-BSD FRML MDRD: 142 ML/MIN/1.73
GLOBULIN UR ELPH-MCNC: 3 GM/DL
GLUCOSE BLDC GLUCOMTR-MCNC: 114 MG/DL (ref 70–130)
GLUCOSE BLDC GLUCOMTR-MCNC: 127 MG/DL (ref 70–130)
GLUCOSE BLDC GLUCOMTR-MCNC: 87 MG/DL (ref 70–130)
GLUCOSE SERPL-MCNC: 93 MG/DL (ref 65–99)
HCT VFR BLD AUTO: 41 % (ref 37.5–51)
HGB BLD-MCNC: 14.2 G/DL (ref 13–17.7)
IMM GRANULOCYTES # BLD AUTO: 0.34 10*3/MM3 (ref 0–0.05)
IMM GRANULOCYTES NFR BLD AUTO: 3.4 % (ref 0–0.5)
LYMPHOCYTES # BLD AUTO: 1.64 10*3/MM3 (ref 0.7–3.1)
LYMPHOCYTES NFR BLD AUTO: 16.4 % (ref 19.6–45.3)
MAGNESIUM SERPL-MCNC: 1.6 MG/DL (ref 1.6–2.4)
MCH RBC QN AUTO: 28.2 PG (ref 26.6–33)
MCHC RBC AUTO-ENTMCNC: 34.6 G/DL (ref 31.5–35.7)
MCV RBC AUTO: 81.5 FL (ref 79–97)
MONOCYTES # BLD AUTO: 0.84 10*3/MM3 (ref 0.1–0.9)
MONOCYTES NFR BLD AUTO: 8.4 % (ref 5–12)
NEUTROPHILS NFR BLD AUTO: 7.14 10*3/MM3 (ref 1.7–7)
NEUTROPHILS NFR BLD AUTO: 71.1 % (ref 42.7–76)
NRBC BLD AUTO-RTO: 0.1 /100 WBC (ref 0–0.2)
PLATELET # BLD AUTO: 281 10*3/MM3 (ref 140–450)
PMV BLD AUTO: 9.3 FL (ref 6–12)
POTASSIUM SERPL-SCNC: 3.6 MMOL/L (ref 3.5–5.2)
PROT SERPL-MCNC: 5.7 G/DL (ref 6–8.5)
RBC # BLD AUTO: 5.03 10*6/MM3 (ref 4.14–5.8)
SODIUM SERPL-SCNC: 138 MMOL/L (ref 136–145)
WBC # BLD AUTO: 10.03 10*3/MM3 (ref 3.4–10.8)

## 2021-03-08 PROCEDURE — 82962 GLUCOSE BLOOD TEST: CPT

## 2021-03-08 PROCEDURE — 97530 THERAPEUTIC ACTIVITIES: CPT

## 2021-03-08 PROCEDURE — 92526 ORAL FUNCTION THERAPY: CPT

## 2021-03-08 PROCEDURE — 94799 UNLISTED PULMONARY SVC/PX: CPT

## 2021-03-08 PROCEDURE — 97110 THERAPEUTIC EXERCISES: CPT

## 2021-03-08 PROCEDURE — 63710000001 PREDNISONE PER 1 MG: Performed by: INTERNAL MEDICINE

## 2021-03-08 PROCEDURE — 25010000002 ENOXAPARIN PER 10 MG: Performed by: HOSPITALIST

## 2021-03-08 PROCEDURE — 85025 COMPLETE CBC W/AUTO DIFF WBC: CPT | Performed by: HOSPITALIST

## 2021-03-08 PROCEDURE — 71046 X-RAY EXAM CHEST 2 VIEWS: CPT

## 2021-03-08 PROCEDURE — 83735 ASSAY OF MAGNESIUM: CPT | Performed by: HOSPITALIST

## 2021-03-08 PROCEDURE — 80053 COMPREHEN METABOLIC PANEL: CPT | Performed by: HOSPITALIST

## 2021-03-08 PROCEDURE — 25010000002 PIPERACILLIN SOD-TAZOBACTAM PER 1 G: Performed by: HOSPITALIST

## 2021-03-08 RX ORDER — MAGNESIUM SULFATE HEPTAHYDRATE 40 MG/ML
2 INJECTION, SOLUTION INTRAVENOUS ONCE
Status: COMPLETED | OUTPATIENT
Start: 2021-03-08 | End: 2021-03-09

## 2021-03-08 RX ADMIN — IPRATROPIUM BROMIDE AND ALBUTEROL SULFATE 3 ML: 2.5; .5 SOLUTION RESPIRATORY (INHALATION) at 16:04

## 2021-03-08 RX ADMIN — PREDNISONE 40 MG: 20 TABLET ORAL at 08:35

## 2021-03-08 RX ADMIN — ENOXAPARIN SODIUM 40 MG: 40 INJECTION SUBCUTANEOUS at 08:36

## 2021-03-08 RX ADMIN — BUPROPION HYDROCHLORIDE 450 MG: 150 TABLET, EXTENDED RELEASE ORAL at 08:35

## 2021-03-08 RX ADMIN — ATORVASTATIN CALCIUM 20 MG: 20 TABLET, FILM COATED ORAL at 00:25

## 2021-03-08 RX ADMIN — ASPIRIN 81 MG: 81 TABLET, COATED ORAL at 08:35

## 2021-03-08 RX ADMIN — DIVALPROEX SODIUM 250 MG: 250 TABLET, DELAYED RELEASE ORAL at 00:23

## 2021-03-08 RX ADMIN — ATORVASTATIN CALCIUM 20 MG: 20 TABLET, FILM COATED ORAL at 20:05

## 2021-03-08 RX ADMIN — PANTOPRAZOLE SODIUM 40 MG: 40 TABLET, DELAYED RELEASE ORAL at 08:36

## 2021-03-08 RX ADMIN — DIVALPROEX SODIUM 250 MG: 250 TABLET, DELAYED RELEASE ORAL at 20:05

## 2021-03-08 RX ADMIN — METOPROLOL TARTRATE 50 MG: 50 TABLET, FILM COATED ORAL at 20:05

## 2021-03-08 RX ADMIN — METOPROLOL TARTRATE 50 MG: 50 TABLET, FILM COATED ORAL at 00:24

## 2021-03-08 RX ADMIN — TAZOBACTAM SODIUM AND PIPERACILLIN SODIUM 3.38 G: 375; 3 INJECTION, SOLUTION INTRAVENOUS at 00:17

## 2021-03-08 RX ADMIN — BUSPIRONE HYDROCHLORIDE 15 MG: 15 TABLET ORAL at 20:05

## 2021-03-08 RX ADMIN — ENOXAPARIN SODIUM 40 MG: 40 INJECTION SUBCUTANEOUS at 20:05

## 2021-03-08 RX ADMIN — METOPROLOL TARTRATE 50 MG: 50 TABLET, FILM COATED ORAL at 08:35

## 2021-03-08 RX ADMIN — NYSTATIN: 100000 POWDER TOPICAL at 08:40

## 2021-03-08 RX ADMIN — TAZOBACTAM SODIUM AND PIPERACILLIN SODIUM 3.38 G: 375; 3 INJECTION, SOLUTION INTRAVENOUS at 14:35

## 2021-03-08 RX ADMIN — ALLOPURINOL 200 MG: 100 TABLET ORAL at 08:35

## 2021-03-08 RX ADMIN — MONTELUKAST SODIUM 10 MG: 10 TABLET, FILM COATED ORAL at 20:05

## 2021-03-08 RX ADMIN — BUSPIRONE HYDROCHLORIDE 15 MG: 15 TABLET ORAL at 08:36

## 2021-03-08 RX ADMIN — BUSPIRONE HYDROCHLORIDE 15 MG: 15 TABLET ORAL at 00:20

## 2021-03-08 RX ADMIN — NYSTATIN: 100000 POWDER TOPICAL at 20:05

## 2021-03-08 RX ADMIN — Medication 1 APPLICATION: at 08:41

## 2021-03-08 RX ADMIN — IPRATROPIUM BROMIDE AND ALBUTEROL SULFATE 3 ML: 2.5; .5 SOLUTION RESPIRATORY (INHALATION) at 11:55

## 2021-03-08 RX ADMIN — BUDESONIDE AND FORMOTEROL FUMARATE DIHYDRATE 2 PUFF: 80; 4.5 AEROSOL RESPIRATORY (INHALATION) at 08:11

## 2021-03-08 RX ADMIN — LEVOTHYROXINE SODIUM 25 MCG: 0.03 TABLET ORAL at 06:20

## 2021-03-08 RX ADMIN — DIVALPROEX SODIUM 250 MG: 250 TABLET, DELAYED RELEASE ORAL at 08:36

## 2021-03-08 RX ADMIN — BUDESONIDE AND FORMOTEROL FUMARATE DIHYDRATE 2 PUFF: 80; 4.5 AEROSOL RESPIRATORY (INHALATION) at 19:52

## 2021-03-08 RX ADMIN — LISINOPRIL 2.5 MG: 2.5 TABLET ORAL at 08:36

## 2021-03-08 RX ADMIN — CLOPIDOGREL 75 MG: 75 TABLET, FILM COATED ORAL at 08:35

## 2021-03-08 RX ADMIN — TAZOBACTAM SODIUM AND PIPERACILLIN SODIUM 3.38 G: 375; 3 INJECTION, SOLUTION INTRAVENOUS at 07:28

## 2021-03-08 RX ADMIN — MONTELUKAST SODIUM 10 MG: 10 TABLET, FILM COATED ORAL at 00:24

## 2021-03-08 RX ADMIN — Medication 1 APPLICATION: at 20:05

## 2021-03-08 RX ADMIN — BUSPIRONE HYDROCHLORIDE 15 MG: 15 TABLET ORAL at 15:49

## 2021-03-08 RX ADMIN — ALPRAZOLAM 0.5 MG: 0.5 TABLET ORAL at 20:05

## 2021-03-08 NOTE — PLAN OF CARE
Goal Outcome Evaluation:  Plan of Care Reviewed With: patient     Outcome Summary: Re-eval of swallow completed. Recommend continue with regular and thins. Meds whole with thin/puree. Recommend general aspiration precautions. Recommend VFSS  to r/o silent aspiration.    Patient was not wearing a face mask during this therapy encounter. Therapist used appropriate personal protective equipment including mask, eye protection and gloves.  Mask used was N95/duckbill. Appropriate PPE was worn during the entire therapy session. Hand hygiene was completed before and after therapy session. Patient is not in enhanced droplet precautions.

## 2021-03-08 NOTE — THERAPY RE-EVALUATION
Acute Care - Speech Language Pathology   Swallow Re-Evaluation [facility]     Patient Name: Garcia Garcia  : 1955  MRN: [id number]  Today's Date: 3/8/2021               Admit Date: 3/4/2021    Visit Dx:     ICD-10-CM ICD-9-CM   1. Pneumonia due to infectious organism, unspecified laterality, unspecified part of lung  J18.9 486   2. Hypoxia  R09.02 799.02     Patient Active Problem List   Diagnosis   • Pneumonia due to infectious organism   • MITZI (obstructive sleep apnea)   • Severe depression (CMS/[facility])   • HLD (hyperlipidemia)   • HTN (hypertension)   • History of stroke   • GERD without esophagitis   • Diastolic CHF, chronic (CMS/[facility])   • COPD (chronic obstructive pulmonary disease) (CMS/[facility])   • Dementia (CMS/[facility])   • Lactic acidosis   • Leukocytosis   • Elevated d-dimer   • Type 2 diabetes mellitus with diabetic neuropathy, unspecified (CMS/[facility])   • Acute respiratory failure with hypoxia (CMS/[facility])   • Vitamin D deficiency   • Hypokalemia     Past Medical History:   Diagnosis Date   • Acidosis    • Arthritis    • Asthma    • Atherosclerotic heart disease of native coronary artery without angina pectoris    • CHF (congestive heart failure) (CMS/[facility])    • Chronic obstructive pulmonary disease, unspecified (CMS/[facility])    • Cognitive communication deficit    • Cyst of kidney, acquired    • Elevated white blood cell count, unspecified    • Essential (primary) hypertension    • GERD (gastroesophageal reflux disease)    • Hemiplegia and hemiparesis following cerebral infarction affecting left non-dominant side (CMS/[facility])    • Hyperlipidemia, unspecified    • Major depressive disorder, recurrent, severe with psychotic symptoms (CMS/[facility])    • Muscle weakness (generalized)    • Other specified metabolic disorders (CMS/[facility])    • Secondary polycythemia    • Sepsis due to Streptococcus pneumoniae (CMS/[facility])    • Sleep apnea, unspecified    • Tachycardia, unspecified    • Toxic encephalopathy    • Type 2 diabetes  "mellitus with diabetic neuropathy, unspecified (CMS/HCC)    • Type 2 diabetes mellitus with hypoglycemia without coma (CMS/[facility])    • Unspecified dementia with behavioral disturbance (CMS/[facility])      Past Surgical History:   Procedure Laterality Date   • KNEE SURGERY          SWALLOW EVALUATION (last 72 hours)      SLP Adult Swallow Evaluation     Row Name 03/08/21 1030                   Rehab Evaluation    Document Type  re-evaluation  -OC        Subjective Information  no complaints  -OC        Patient Observations  alert;cooperative;agree to therapy c/o bloody nose this AM  -OC        Care Plan Review  evaluation/treatment results reviewed  -OC        Patient Effort  good  -OC        Symptoms Noted During/After Treatment  none  -OC           General Information    Patient Profile Reviewed  yes  -OC        Pertinent History Of Current Problem  per MD notes \"recurrent PNA\"  -OC        Current Method of Nutrition  regular textures;thin liquids  -OC        Precautions/Limitations, Vision  WFL;for purposes of eval  -OC        Precautions/Limitations, Hearing  WFL;for purposes of eval  -OC        Prior Level of Function-Communication  WFL  -OC        Prior Level of Function-Swallowing  no diet consistency restrictions  -OC        Plans/Goals Discussed with  patient;agreed upon  -OC        Barriers to Rehab  none identified  -OC        Patient's Goals for Discharge  patient did not state  -OC           Pain Scale: Numbers Pre/Post-Treatment    Pretreatment Pain Rating  0/10 - no pain  -OC        Posttreatment Pain Rating  0/10 - no pain  -OC           Oral Motor Structure and Function    Dentition Assessment  missing teeth  -OC        Secretion Management  WNL/WFL  -OC        Mucosal Quality  moist, healthy  -OC        Volitional Swallow  WFL  -OC           Oral Musculature and Cranial Nerve Assessment    Oral Motor General Assessment  WFL  -OC           Clinical Swallow Eval    Clinical Swallow Evaluation Summary  Re-eval " completed. Patient demonstrated delayed cough X1 s/p thin liquids via cup. No overt s/s aspiration n subsequent trials of thins, puree, mechanical soft, and regular textures.  Patient agreeable to VFSS to r/o aspiration.   -OC           Clinical Impression    SLP Swallowing Diagnosis  R/O pharyngeal dysphagia  -OC        Functional Impact  risk of aspiration/pneumonia  -OC        Rehab Potential/Prognosis, Swallowing  good, to achieve stated therapy goals  -OC        Swallow Criteria for Skilled Therapeutic Interventions Met  demonstrates skilled criteria  -OC           Recommendations    Therapy Frequency (Swallow)  PRN  -OC        Predicted Duration Therapy Intervention (Days)  until discharge  -OC        SLP Diet Recommendation  regular textures;thin liquids  -OC        Recommended Diagnostics  VFSS (MBS)  -OC        Recommended Precautions and Strategies  small bites of food and sips of liquid;upright posture during/after eating  -OC        Oral Care Recommendations  Oral Care BID/PRN  -OC        SLP Rec. for Method of Medication Administration  meds whole;with thin liquids;with pudding or applesauce;as tolerated  -OC        Monitor for Signs of Aspiration  yes;notify SLP if any concerns  -OC        Anticipated Discharge Disposition (SLP)  skilled nursing facility  -OC           Oral Nutrition/Hydration Goal 1 (SLP)    Oral Nutrition/Hydration Goal 1, SLP  Pt will safely tolerate the least restrictive diet with no s/s of aspiration.  -OC        Time Frame (Oral Nutrition/Hydration Goal 1, SLP)  by discharge  -OC          User Key  (r) = Recorded By, (t) = Taken By, (c) = Cosigned By    Initials Name Effective Dates    OC Alice, MUKESH Sanchez,[facility]-SLP 06/08/18 -           EDUCATION  The patient has been educated in the following areas:   Dysphagia (Swallowing Impairment).    SLP Recommendation and Plan  SLP Swallowing Diagnosis: R/O pharyngeal dysphagia  SLP Diet Recommendation: regular textures, thin  liquids  Recommended Precautions and Strategies: small bites of food and sips of liquid, upright posture during/after eating  SLP Rec. for Method of Medication Administration: meds whole, with thin liquids, with pudding or applesauce, as tolerated     Monitor for Signs of Aspiration: yes, notify SLP if any concerns  Recommended Diagnostics: VFSS (MBS)  Swallow Criteria for Skilled Therapeutic Interventions Met: demonstrates skilled criteria  Anticipated Discharge Disposition (SLP): skilled nursing facility  Rehab Potential/Prognosis, Swallowing: good, to achieve stated therapy goals  Therapy Frequency (Swallow): PRN  Predicted Duration Therapy Intervention (Days): until discharge                         Plan of Care Reviewed With: patient  Outcome Summary: Re-eval of swallow completed. Recommend continue with regular and thins. Meds whole with thin/puree. Recommend general aspiration precautions. Recommend VFSS  to r/o silent aspiration.    SLP GOALS     Row Name 03/08/21 1030             Oral Nutrition/Hydration Goal 1 (SLP)    Oral Nutrition/Hydration Goal 1, SLP  Pt will safely tolerate the least restrictive diet with no s/s of aspiration.  -OC      Time Frame (Oral Nutrition/Hydration Goal 1, SLP)  by discharge  -OC        User Key  (r) = Recorded By, (t) = Taken By, (c) = Cosigned By    Initials Name Provider Type    Laura Castellanos MA,NAEEM-SLP Speech and Language Pathologist           SLP Outcome Measures (last 72 hours)      SLP Outcome Measures     Row Name 03/08/21 1300             SLP Outcome Measures    Outcome Measure Used?  Adult NOMS  -OC         Adult FCM Scores    FCM Chosen  Swallowing  -OC      Swallowing FCM Score  6  -OC        User Key  (r) = Recorded By, (t) = Taken By, (c) = Cosigned By    Initials Name Effective Dates    Laura Castellanos MA, CCC-SLP 06/08/18 -            Time Calculation:   Time Calculation- SLP     Row Name 03/08/21 1304             Time Calculation- SLP    SLP Start Time   1030  -OC      SLP Received On  03/08/21  -OC        User Key  (r) = Recorded By, (t) = Taken By, (c) = Cosigned By    Initials Name Provider Type    Laura Castellanos MA,CCC-SLP Speech and Language Pathologist          Therapy Charges for Today     Code Description Service Date Service Provider Modifiers Qty    [id number]  ST TREATMENT SWALLOW 3 3/8/2021 Laura Jenkins MA,NAEEM-SLP GN 1               Laura Jenkins MA, CCC-SLP  3/8/2021

## 2021-03-08 NOTE — PROGRESS NOTES
Continued Stay Note  [facility]     Patient Name: Garcia Garcia  MRN: [id number]  Today's Date: 3/8/2021    Admit Date: 3/4/2021    Discharge Plan     Row Name 03/08/21 1518       Plan    Plan Comments  Order noted for [facility].  Gaye/Malissa notified. Philomena Jarrett RN    Row Name 03/08/21 1512       Plan    Plan  family wanting patient to fly to [place] with them at [place]    Patient/Family in Agreement with Plan  yes    Plan Comments  Spoke with daughter in law, Lakshmi Moses, by telephone and she is asking about potential dc date.  Explained to her that patient will likely be here a few more days. She states that she wants to buy plans tickets and fly the patient to [place] with her.  Will follow. Philomena Jarrett RN        Discharge Codes    No documentation.       Expected Discharge Date and Time     Expected Discharge Date Expected Discharge Time    Mar 8, 2021             Philomena Jarrett RN

## 2021-03-08 NOTE — PROGRESS NOTES
Continued Stay Note  [facility]     Patient Name: Garcia Garcia  MRN: [id number]  Today's Date: 3/8/2021    Admit Date: 3/4/2021    Discharge Plan     Row Name 03/08/21 1512       Plan    Plan  family wanting patient to fly to [place] with them at [place]    Patient/Family in Agreement with Plan  yes    Plan Comments  Spoke with daughter in law, Lakshmi Moses, by telephone and she is asking about potential dc date.  Explained to her that patient will likely be here a few more days. She states that she wants to buy plans tickets and fly the patient to [place] with her.  Will follow. Philomena Jarrett RN        Discharge Codes    No documentation.       Expected Discharge Date and Time     Expected Discharge Date Expected Discharge Time    Mar 8, 2021             Philomena Jarrett RN

## 2021-03-08 NOTE — PROGRESS NOTES
[facility]  [phone]  Rodger Polanco MD    Subjective:  LOS: 4    No new complaints. Denies cough or phlegm. No wheezing or chest pains.    Objective   Vital Signs past 24hrs    Temp range: Temp (24hrs), Av.4 °F (36.3 °C), Min:96.3 °F (35.7 °C), Max:98.1 °F (36.7 °C)    BP range: BP: (126-152)/(66-78) 152/78  Pulse range: Heart Rate:  [65-81] 67  Resp rate range: Resp:  [18-24] 24    Device (Oxygen Therapy): room airFlow (L/min):  [2-3] 2  Oxygen range:SpO2:  [90 %-97 %] 93 %      126 kg (278 lb 7.1 oz); Body mass index is 43.6 kg/m².    Intake/Output Summary (Last 24 hours) at 3/8/2021 1547  Last data filed at 3/8/2021 1300  Gross per 24 hour   Intake 1310 ml   Output 2200 ml   Net -890 ml       Physical Exam  Constitutional:       Appearance: He is obese.   HENT:      Mouth/Throat:      Mouth: Mucous membranes are moist.   Eyes:      Pupils: Pupils are equal, round, and reactive to light.   Cardiovascular:      Rate and Rhythm: Normal rate and regular rhythm.      Heart sounds: No murmur.   Pulmonary:      Effort: Pulmonary effort is normal.      Breath sounds: Decreased breath sounds present. No wheezing.   Abdominal:      General: Bowel sounds are normal.      Palpations: Abdomen is soft. There is no mass.      Tenderness: There is no abdominal tenderness.   Musculoskeletal:         General: No swelling.      Cervical back: Neck supple.   Skin:     General: Skin is warm.   Neurological:      Comments: Left hemiparesis      exam limited by morbid obesity  Results Review:    I have reviewed the laboratory and imaging data since the last note by [facility] physician.  My annotations are noted in assessment and plan.    Medication Review:  I have reviewed the current MAR.  My annotations are noted in assessment and plan.       Plan   PCCM Problems  Acute hypoxic respiratory failure; severe and worsening  Noninvasive ventilator use  Acute metabolic encephalopathy  Concern for aspiration  pneumonia  Asthma/COPD exacerbation  Active tobacco abuse  COVID-19 ruled out x2  Relevant Medical Diagnoses  CVA with left hemiparesis  MITZI,  Morbid obesity  Chronic benzodiazepine use  Baseline cognitive defect/dementia        Plan of Treatment  Continue supplemental oxygen and wean as tolerated. Now on RA    I reviewed his CT chest and he has extensive groundglass infiltrates in both lungs.  However also limited by morbid obesity and interpreting the chest film.  So far all infectious markers are negative. CxR today shows minimal parenchymal infiltrates. Needs fu with pulmonary.    Consideration may be organizing pneumonia.  Currently on 40 mg prednisone and appears clinically better. Continue present dose for ONE MONTH then taper off. Needs fu with pulmonary.    Apparent exacerbation of COPD/asthma.  Presently not wheezing but limited exam due to morbid obesity.  On steroids as above.    His recent swallow eval does not confirm the diagnosis of dysphagia and he was permitted regular diet.    Consider bronchoscopy if fails to improve.  However will be a high risk procedure with his morbid obesity.    Okay discharge per us.  [facility].  If he would like follow-up with us, please make appointment with Dr. Vivas.    Rodger Polanco MD  03/08/21  15:47 EST    While in the room and during my examination of the patient I wore gloves, gown, mask, eye protection and followed enhanced droplet/contact isolation protocol and precautions.  I washed my hands before and after this patient encounter.    Part of this note may be an electronic transcription/translation of spoken language to printed text using the Dragon Dictation System.

## 2021-03-08 NOTE — THERAPY TREATMENT NOTE
Patient Name: Garcia Garcia  : 1955    MRN: [id number]                              Today's Date: 3/8/2021       Admit Date: 3/4/2021    Visit Dx:     ICD-10-CM ICD-9-CM   1. Pneumonia due to infectious organism, unspecified laterality, unspecified part of lung  J18.9 486   2. Hypoxia  R09.02 799.02     Patient Active Problem List   Diagnosis   • Pneumonia due to infectious organism   • MITZI (obstructive sleep apnea)   • Severe depression (CMS/[facility])   • HLD (hyperlipidemia)   • HTN (hypertension)   • History of stroke   • GERD without esophagitis   • Diastolic CHF, chronic (CMS/[facility])   • COPD (chronic obstructive pulmonary disease) (CMS/[facility])   • Dementia (CMS/[facility])   • Lactic acidosis   • Leukocytosis   • Elevated d-dimer   • Type 2 diabetes mellitus with diabetic neuropathy, unspecified (CMS/[facility])   • Acute respiratory failure with hypoxia (CMS/[facility])   • Vitamin D deficiency   • Hypokalemia     Past Medical History:   Diagnosis Date   • Acidosis    • Arthritis    • Asthma    • Atherosclerotic heart disease of native coronary artery without angina pectoris    • CHF (congestive heart failure) (CMS/[facility])    • Chronic obstructive pulmonary disease, unspecified (CMS/[facility])    • Cognitive communication deficit    • Cyst of kidney, acquired    • Elevated white blood cell count, unspecified    • Essential (primary) hypertension    • GERD (gastroesophageal reflux disease)    • Hemiplegia and hemiparesis following cerebral infarction affecting left non-dominant side (CMS/[facility])    • Hyperlipidemia, unspecified    • Major depressive disorder, recurrent, severe with psychotic symptoms (CMS/[facility])    • Muscle weakness (generalized)    • Other specified metabolic disorders (CMS/[facility])    • Secondary polycythemia    • Sepsis due to Streptococcus pneumoniae (CMS/[facility])    • Sleep apnea, unspecified    • Tachycardia, unspecified    • Toxic encephalopathy    • Type 2 diabetes mellitus with diabetic neuropathy, unspecified (CMS/[facility])    •  Type 2 diabetes mellitus with hypoglycemia without coma (CMS/[facility])    • Unspecified dementia with behavioral disturbance (CMS/[facility])      Past Surgical History:   Procedure Laterality Date   • KNEE SURGERY       General Information     Row Name 03/08/21 1316          OT Time and Intention    Document Type  therapy note (daily note)  -CW     Mode of Treatment  occupational therapy  -CW     Row Name 03/08/21 1316          General Information    Patient Profile Reviewed  yes  -CW     Existing Precautions/Restrictions  fall  -CW     Row Name 03/08/21 1316          Cognition    Orientation Status (Cognition)  oriented x 3 Seemed anxious and impulsive  -CW     Row Name 03/08/21 1316          Safety Issues, Functional Mobility    Safety Issues Affecting Function (Mobility)  safety precautions follow-through/compliance;insight into deficits/self-awareness;safety precaution awareness Seemed anxious and impulsive.  -CW       User Key  (r) = Recorded By, (t) = Taken By, (c) = Cosigned By    Initials Name Provider Type    CW Chio Unger OTR Occupational Therapist          Mobility/ADL's     Row Name 03/08/21 1318          Bed Mobility    All Activities, [place] (Bed Mobility)  minimum assist (75% patient effort)  -     Assistive Device (Bed Mobility)  bed rails;head of bed elevated  -     Row Name 03/08/21 1324          Transfers    Comment (Transfers)  Unable to progess to sit-stand due to fatigue and dizziness.  -     Row Name 03/08/21 1318          Activities of Daily Living    BADL Assessment/Intervention  upper body dressing;lower body dressing;grooming;feeding  -     Row Name 03/08/21 1318          Lower Body Dressing Assessment/Training    [place] Level (Lower Body Dressing)  moderate assist (50% patient effort);lower body dressing skills;doff;don;socks;verbal cues  -     Position (Lower Body Dressing)  edge of bed sitting;supported sitting  -CW     Row Name 03/08/21 1318          Grooming  Assessment/Training    [place] Level (Grooming)  grooming skills;hair care, combing/brushing;set up;verbal cues  -     Position (Grooming)  edge of bed sitting  -     Row Name 03/08/21 1318          Self-Feeding Assessment/Training    [place] Level (Feeding)  set up;feeding skills  -CW     Position (Self-Feeding)  sitting up in bed  -     Row Name 03/08/21 1318          Upper Body Dressing Assessment/Training    [place] Level (Upper Body Dressing)  upper body dressing skills;doff;don;minimum assist (75% patient effort);verbal cues  -     Position (Upper Body Dressing)  edge of bed sitting;supported sitting  -     Comment (Upper Body Dressing)  Changed to clean hosp gown-Malika  -       User Key  (r) = Recorded By, (t) = Taken By, (c) = Cosigned By    Initials Name Provider Type    CW Chio Unger OTR Occupational Therapist        Obj/Interventions     Row Name 03/08/21 1322          Shoulder (Therapeutic Exercise)    Shoulder (Therapeutic Exercise)  AROM (active range of motion)  -     Shoulder AROM (Therapeutic Exercise)  bilateral;horizontal aBduction/aDduction;flexion;extension;10 repetitions;2 sets  -     Row Name 03/08/21 1322          Elbow/Forearm (Therapeutic Exercise)    Elbow/Forearm (Therapeutic Exercise)  AROM (active range of motion)  -     Elbow/Forearm AROM (Therapeutic Exercise)  bilateral;flexion;extension;supination;pronation;sitting;10 repetitions;2 sets  -     Row Name 03/08/21 1322          Wrist (Therapeutic Exercise)    Wrist (Therapeutic Exercise)  AROM (active range of motion)  -     Wrist AROM (Therapeutic Exercise)  bilateral;flexion;extension;10 repetitions;2 sets  -     Row Name 03/08/21 1322          Hand (Therapeutic Exercise)    Hand (Therapeutic Exercise)  AROM (active range of motion)  -     Hand AROM/AAROM (Therapeutic Exercise)  bilateral;AROM (active range of motion);finger flexion;finger extension;10 repetitions;2 sets  -     Row Name  03/08/21 1322          Balance    Dynamic Sitting Balance  mild impairment  -CW     Row Name 03/08/21 1322          Therapeutic Exercise    Therapeutic Exercise  shoulder;wrist;hand;elbow/forearm  -CW       User Key  (r) = Recorded By, (t) = Taken By, (c) = Cosigned By    Initials Name Provider Type    Chio Morrow OTR Occupational Therapist        Goals/Plan    No documentation.       Clinical Impression     Row Name 03/08/21 1325          Pain Scale: Numbers Pre/Post-Treatment    Pretreatment Pain Rating  3/10  -CW     Posttreatment Pain Rating  3/10  -CW     Pain Location - Orientation  lower  -CW     Pain Location  back  -CW     Pain Intervention(s)  Repositioned  -CW     Row Name 03/08/21 1325          Plan of Care Review    Plan of Care Reviewed With  patient  -CW     Outcome Summary  OT- Mod encouragement to particiate. C/o fatigue and back pain. Seemed anxious and impulsive. UE therap ex. completed EOB with rest breaks. Fatigues easily. Functional endurance P+. LE dressing socks mod A seated EOB. Grooming and self feeding with set up. Pt. declined oral hygiene. Unable to progress to sit-stand due to fatigue and dizziness. Reviewed general safety.  Plan to cont. OT to maximize indep and safety with self care/functional mobility.  -CW     Row Name 03/08/21 1325          Therapy Plan Review/Discharge Plan (OT)    Anticipated Discharge Disposition (OT)  skilled nursing facility  -CW     Row Name 03/08/21 1325          Positioning and Restraints    Pre-Treatment Position  in bed  -CW     Post Treatment Position  bed  -CW     In Bed  supine;call light within reach;encouraged to call for assist;exit alarm on  -CW       User Key  (r) = Recorded By, (t) = Taken By, (c) = Cosigned By    Initials Name Provider Type    Chio Morrow OTR Occupational Therapist        Outcome Measures     Row Name 03/08/21 1330          How much help from another is currently needed...    Putting on and taking off regular lower  body clothing?  2  -CW     Bathing (including washing, rinsing, and drying)  2  -CW     Toileting (which includes using toilet bed pan or urinal)  2  -CW     Putting on and taking off regular upper body clothing  3  -CW     Taking care of personal grooming (such as brushing teeth)  3  -CW     Eating meals  3  -CW     AM-PAC 6 Clicks Score (OT)  15  -CW       User Key  (r) = Recorded By, (t) = Taken By, (c) = Cosigned By    Initials Name Provider Type    Chio Morrow OTR Occupational Therapist        Occupational Therapy Education                 Title: PT OT SLP Therapies (In Progress)     Topic: Occupational Therapy (In Progress)     Point: ADL training (In Progress)     Description:   Instruct learner(s) on proper safety adaptation and remediation techniques during self care or transfers.   Instruct in proper use of assistive devices.              Learning Progress Summary           Patient Acceptance, E, NR by YASMINE at 3/8/2021 1331    Comment: Reviewed safety and pacing during self care.    Acceptance, E, VU by BILL at 3/5/2021 1505    Comment: Educated pt on OT role, OT plan of care, safety techniques for ADLs and functional transfers, as well as energy conservation techniques.   Family Acceptance, E, VU by BILL at 3/5/2021 1505    Comment: Educated pt on OT role, OT plan of care, safety techniques for ADLs and functional transfers, as well as energy conservation techniques.                   Point: Home exercise program (Not Started)     Description:   Instruct learner(s) on appropriate technique for monitoring, assisting and/or progressing therapeutic exercises/activities.              Learner Progress:  Not documented in this visit.          Point: Precautions (Done)     Description:   Instruct learner(s) on prescribed precautions during self-care and functional transfers.              Learning Progress Summary           Patient Acceptance, E, VU by BILL at 3/5/2021 1505    Comment: Educated pt on OT role, OT  plan of care, safety techniques for ADLs and functional transfers, as well as energy conservation techniques.   Family Acceptance, E, VU by BILL at 3/5/2021 1505    Comment: Educated pt on OT role, OT plan of care, safety techniques for ADLs and functional transfers, as well as energy conservation techniques.                   Point: Body mechanics (Done)     Description:   Instruct learner(s) on proper positioning and spine alignment during self-care, functional mobility activities and/or exercises.              Learning Progress Summary           Patient Acceptance, E, VU by BILL at 3/5/2021 1505    Comment: Educated pt on OT role, OT plan of care, safety techniques for ADLs and functional transfers, as well as energy conservation techniques.   Family Acceptance, E, VU by BILL at 3/5/2021 1505    Comment: Educated pt on OT role, OT plan of care, safety techniques for ADLs and functional transfers, as well as energy conservation techniques.                               User Key     Initials Effective Dates Name Provider Type Discipline     06/08/18 -  Chio Unger OTR Occupational Therapist OT    BILL 09/14/20 -  Fior Gonzalez OT Occupational Therapist OT              OT Recommendation and Plan     Plan of Care Review  Plan of Care Reviewed With: patient  Outcome Summary: OT- Mod encouragement to particiate. C/o fatigue and back pain. Seemed anxious and impulsive. UE therap ex. completed EOB with rest breaks. Fatigues easily. Functional endurance P+. LE dressing socks mod A seated EOB. Grooming and self feeding with set up. Pt. declined oral hygiene. Unable to progress to sit-stand due to fatigue and dizziness. Reviewed general safety.  Plan to cont. OT to maximize indep and safety with self care/functional mobility.     Time Calculation:   Time Calculation- OT     Row Name 03/08/21 1333             Time Calculation- OT    OT Start Time  1258  -CW      OT Stop Time  1317  -CW      OT Time Calculation (min)  19 min   -CW      Total Timed Code Minutes- OT  19 minute(s)  -CW      OT - Next Appointment  03/09/21  -CW        User Key  (r) = Recorded By, (t) = Taken By, (c) = Cosigned By    Initials Name Provider Type    Chio Morrow OTR Occupational Therapist        Therapy Charges for Today     Code Description Service Date Service Provider Modifiers Qty    [id number] HC OT THER PROC EA 15 MIN 3/8/2021 Chio Unger OTR GO 1               DOMINGO Pendleton  3/8/2021

## 2021-03-08 NOTE — PLAN OF CARE
Goal Outcome Evaluation:  Plan of Care Reviewed With: patient     Outcome Summary: OT- Mod encouragement to particiate. C/o fatigue and back pain. Seemed anxious and impulsive. UE therap ex. completed EOB with rest breaks. Fatigues easily. Functional endurance P+. LE dressing socks mod A seated EOB. Grooming and self feeding with set up. Pt. declined oral hygiene. Unable to progress to sit-stand due to fatigue and dizziness. Reviewed general safety.  Plan to cont. OT to maximize indep and safety with self care/functional mobility.    Patient was wearing a face mask during this therapy encounter. Therapist used appropriate personal protective equipment including mask, gloves, and eye protection.  Mask used was standard procedure mask. Appropriate PPE was worn during the entire therapy session. Hand hygiene was completed before and after therapy session. Patient is not in enhanced droplet precautions.

## 2021-03-08 NOTE — PLAN OF CARE
Problem: Adult Inpatient Plan of Care  Goal: Plan of Care Review  Outcome: Ongoing, Progressing  Flowsheets (Taken 3/8/2021 0430)  Progress: improving  Plan of Care Reviewed With: patient  Outcome Summary: meds per order, no falls, skin care per protocol, o2 3l nc, see labs and vs   Goal Outcome Evaluation:  Plan of Care Reviewed With: patient  Progress: improving  Outcome Summary: meds per order, no falls, skin care per protocol, o2 3l nc, see labs and vs

## 2021-03-08 NOTE — PLAN OF CARE
Goal Outcome Evaluation:  Plan of Care Reviewed With: patient  Progress: improving  Outcome Summary: meds per order, no falls, skin care per protocol, o2 3l nc, see labs and vs

## 2021-03-08 NOTE — PROGRESS NOTES
Name: Garcia Garcia ADMIT: 3/4/2021   : 1955  PCP: Vargas Ling MD    MRN: [id number] LOS: 4 days   AGE/SEX: 65 y.o. male  ROOM: [facility]     Subjective   Subjective    C/o being lightheaded when upright with PT this AM. Back pain improved today. Eating and drinking well. Mild cough. No SOA at rest. A little tired, but otherwise doing very well. Diarrhea resolved now. Voiding well today.    Review of Systems   Constitutional: Positive for fatigue. Negative for appetite change, chills, diaphoresis and fever.   HENT: Negative for congestion, ear pain, mouth sores, rhinorrhea, sore throat, trouble swallowing and voice change.    Eyes: Negative for pain and visual disturbance.   Respiratory: Positive for cough. Negative for choking, chest tightness, shortness of breath, wheezing and stridor.    Cardiovascular: Negative for chest pain, palpitations and leg swelling.   Gastrointestinal: Negative for abdominal pain, blood in stool, diarrhea, nausea and vomiting.   Endocrine: Negative for cold intolerance and heat intolerance.   Genitourinary: Negative for decreased urine volume, difficulty urinating, dysuria and hematuria.   Musculoskeletal: Negative for back pain and neck pain.   Skin: Positive for rash. Negative for color change and pallor.   Allergic/Immunologic: Negative for environmental allergies and food allergies.   Neurological: Positive for light-headedness (when upright). Negative for tremors, seizures, syncope, speech difficulty and headaches.   Hematological: Negative for adenopathy. Does not bruise/bleed easily.   Psychiatric/Behavioral: Negative for behavioral problems, confusion and hallucinations.        Objective   Objective   Vital Signs  Temp:  [96.3 °F (35.7 °C)-98.1 °F (36.7 °C)] 96.3 °F (35.7 °C)  Heart Rate:  [65-81] 67  Resp:  [18-24] 24  BP: (126-152)/(66-78) 152/78  SpO2:  [90 %-97 %] 93 %  on  Flow (L/min):  [2-3] 2;   Device (Oxygen Therapy): room air  Body mass index is  43.6 kg/m².  Physical Exam  Vitals and nursing note reviewed.   Constitutional:       General: He is not in acute distress.     Appearance: Normal appearance. He is obese. He is ill-appearing (chronically). He is not toxic-appearing or diaphoretic.   HENT:      Head: Normocephalic and atraumatic.      Right Ear: External ear normal.      Left Ear: External ear normal.      Nose: Nose normal.      Mouth/Throat:      Mouth: Mucous membranes are moist.      Pharynx: Oropharynx is clear.   Eyes:      General: No scleral icterus.        Right eye: No discharge.         Left eye: No discharge.      Conjunctiva/sclera: Conjunctivae normal.   Cardiovascular:      Rate and Rhythm: Normal rate and regular rhythm.      Pulses: Normal pulses.   Pulmonary:      Effort: Pulmonary effort is normal. No respiratory distress.      Breath sounds: Normal breath sounds.      Comments: BS decreased due to body habitus  Abdominal:      General: Bowel sounds are normal. There is no distension.      Palpations: Abdomen is soft.      Tenderness: There is no abdominal tenderness.      Comments: obese   Musculoskeletal:         General: No swelling or deformity. Normal range of motion.      Cervical back: Neck supple. No rigidity.   Lymphadenopathy:      Cervical: No cervical adenopathy.   Skin:     General: Skin is warm and dry.      Capillary Refill: Capillary refill takes less than 2 seconds.      Coloration: Skin is not jaundiced.      Comments: Scattered areas of what appear to be neurotic excoriation   Neurological:      Mental Status: He is alert. Mental status is at baseline.      Cranial Nerves: No cranial nerve deficit.      Coordination: Coordination normal.      Comments: Oriented to person, place, purpose   Psychiatric:         Mood and Affect: Mood normal.         Behavior: Behavior normal.         Thought Content: Thought content normal.         Results Review     I reviewed the patient's new clinical results.  Results from last  7 days   Lab Units 03/08/21  0607 03/07/21  0550 03/06/21  0551 03/05/21  0659   WBC 10*3/mm3 10.03 10.59 11.95* 11.79*   HEMOGLOBIN g/dL 14.2 13.4 13.6 14.4   PLATELETS 10*3/mm3 281 259 293 345     Results from last 7 days   Lab Units 03/08/21  0607 03/07/21  0550 03/06/21  0551 03/05/21  0659   SODIUM mmol/L 138 134* 135* 143   POTASSIUM mmol/L 3.6 3.6 3.4* 3.9   CHLORIDE mmol/L 100 99 99 104   CO2 mmol/L 27.9 26.4 26.2 25.5   BUN mg/dL 9 13 20 24*   CREATININE mg/dL 0.58* 0.58* 0.70* 0.75*   GLUCOSE mg/dL 93 91 98 135*   Estimated Creatinine Clearance: 166.1 mL/min (A) (by C-G formula based on SCr of 0.58 mg/dL (L)).  Results from last 7 days   Lab Units 03/08/21  0607 03/07/21  0550 03/06/21  0551 03/05/21  0659   ALBUMIN g/dL 2.70* 2.30* 2.30* 2.70*   BILIRUBIN mg/dL 0.3 0.4 0.6 0.4   ALK PHOS U/L 62 64 68 74   AST (SGOT) U/L 33 32 58* 29   ALT (SGPT) U/L 56* 55* 65* 40     Results from last 7 days   Lab Units 03/08/21  0607 03/07/21  0550 03/06/21  0551 03/05/21  0659   CALCIUM mg/dL 7.9* 8.0* 8.3* 9.0   ALBUMIN g/dL 2.70* 2.30* 2.30* 2.70*   MAGNESIUM mg/dL 1.6 1.7 1.8 2.2     Results from last 7 days   Lab Units 03/05/21  0659 03/04/21  0807 03/04/21  0332 03/04/21  0215   PROCALCITONIN ng/mL 0.10  --   --  0.17   LACTATE mmol/L  --  1.5 2.1*  --      COVID19   Date Value Ref Range Status   03/04/2021 Not Detected Not Detected - Ref. Range Final   03/04/2021 Not Detected Not Detected - Ref. Range Final     Glucose   Date/Time Value Ref Range Status   03/08/2021 1054 114 70 - 130 mg/dL Final   03/08/2021 0607 87 70 - 130 mg/dL Final   03/07/2021 1625 138 (H) 70 - 130 mg/dL Final   03/07/2021 1052 99 70 - 130 mg/dL Final   03/07/2021 0636 95 70 - 130 mg/dL Final   03/06/2021 2049 148 (H) 70 - 130 mg/dL Final   03/06/2021 1548 151 (H) 70 - 130 mg/dL Final       CT Head Without Contrast  Narrative: Patient: ELMER ROSALES  Time Out: 18:32  Exam(s): CT HEAD Without Contrast     EXAM:    CT Head Without  Intravenous Contrast    CLINICAL HISTORY:     Reason for exam: Head trauma, minor (Age >= 65y). fall.    TECHNIQUE:    Axial computed tomography images of the head brain without intravenous   contrast.  CTDI is 54.8 mGy and DLP is 1056.6 mGy-cm.  This CT exam was   performed according to the principle of ALARA (As Low As Reasonably   Achievable) by using one or more of the following dose reduction   techniques: automated exposure control, adjustment of the mA and or kV   according to patient size, and or use of iterative reconstruction   technique.    COMPARISON:    None    FINDINGS:    Brain: No acute infarct or hemorrhage identified. No extra-axial fluid   collection. No mass effect or midline shift. Scattered areas of   hypoattenuation in the supratentorial white matter likely represent   chronic small vessel ischemic changes.      Ventricles and sulci: Prominence of the ventricles and sulci is likely   secondary to cerebral volume loss.      Bones:  Normal. No bony lesion or acute fracture.      Subcutaneous tissues: Normal.      Sinuses: Normal. No air-fluid levels or mucosal thickening.      Mastoid air cells: Normal.      Orbits: Grossly unremarkable.      Other: Atherosclerotic calcifications in the intracranial vasculature.    IMPRESSION:     1.  No acute intracranial abnormality.  2.  Chronic small vessel ischemic changes and cerebral volume loss.  Impression: Electronically signed by Enriqueta Dickinson M.D. on 03-07-21 at 1832    Scheduled Medications  allopurinol, 200 mg, Oral, Daily  aspirin, 81 mg, Oral, Daily  atorvastatin, 20 mg, Oral, Nightly  budesonide-formoterol, 2 puff, Inhalation, BID - RT  buPROPion SR, 450 mg, Oral, Daily  busPIRone, 15 mg, Oral, TID  clopidogrel, 75 mg, Oral, Daily  divalproex, 250 mg, Oral, BID  enoxaparin, 40 mg, Subcutaneous, Q12H  hydrocortisone-bacitracin-zinc oxide-nystatin, 1 application, Topical, BID  ipratropium-albuterol, 3 mL, Nebulization, 4x Daily -  RT  levothyroxine, 25 mcg, Oral, Q AM  lisinopril, 2.5 mg, Oral, Daily  metoprolol tartrate, 50 mg, Oral, BID  montelukast, 10 mg, Oral, Nightly  nystatin, , Topical, Q12H  pantoprazole, 40 mg, Oral, Daily  piperacillin-tazobactam, 3.375 g, Intravenous, Q8H  predniSONE, 40 mg, Oral, Daily With Breakfast  vitamin D, 50,000 Units, Oral, Q7 Days    Infusions   Diet  Diet Regular; Cardiac, Consistent Carbohydrate       Assessment/Plan     Active Hospital Problems    Diagnosis  POA   • **Pneumonia due to infectious organism [J18.9]  Yes   • Hypokalemia [E87.6]  No   • Vitamin D deficiency [E55.9]  Yes   • MITZI (obstructive sleep apnea) [G47.33]  Yes   • Severe depression (CMS/[facility]) [F32.2]  Yes   • HLD (hyperlipidemia) [E78.5]  Yes   • HTN (hypertension) [I10]  Yes   • History of stroke [Z86.73]  Not Applicable   • GERD without esophagitis [K21.9]  Yes   • Diastolic CHF, chronic (CMS/HCC) [I50.32]  Yes   • COPD (chronic obstructive pulmonary disease) (CMS/HCC) [J44.9]  Yes   • Dementia (CMS/[facility]) [F03.90]  Yes   • Lactic acidosis [E87.2]  Yes   • Leukocytosis [D72.829]  Yes   • Elevated d-dimer [R79.89]  Yes   • Type 2 diabetes mellitus with diabetic neuropathy, unspecified (CMS/[facility]) [E11.40]  Yes   • Acute respiratory failure with hypoxia (CMS/[facility]) [J96.01]  Yes      Resolved Hospital Problems   No resolved problems to display.       66yo gentleman sent from NH with SOA and hypoxia. Suffers from dementia and likely some metabolic encephalopathy on top of that due to infection. Admitted here with concern for recurrent PNA. Was just admitted to another facility for PNA with sepsis.     Suspect recurrent bacterial PNA: acute hypoxic resp failure, bilateral infiltrates on CT, and elevated WBC--aspiration PNA is concern  Continue IV Zosyn, dc'd Vanc as MRSA screen negative  Cultures all neg so far, COViD neg  WBC wnl today  Afebrile, no hypotension or tachycardia  IS and OPEP in use  O2 pretty stable again today on only  2L/min  Steroids (prednisone) added for smoking/COPD history  Repeat CXR today pending  Appreciate Pulm attention to pt    Lightheaded when upright, quite orthostatic today, BCR <2sec, d/w RN, will ask CCP to get pt some Jobst stockings, keep HOB >60deg, will give 500ml IV NS    SLP eval very reassuring (now that he is more awake/alert), regular diet in place with swallow precautions, restarted home meds, though lower dose of benzo and depakote, SLP recommends VFSS today    DDimer elevated, no PE on CTA, venous u/s of legs neg for DVT    Stool loose but not frequent, abd exam benign, WBC not impressive, CDiff negative, continue to monitor as he is at risk for CDiff given amount of abx he's received recently    A1c only 6.14, continue to monitor accuchecks, sugars okay again today despite steroids    Vitamin D very low, started po replacement    K+ wnl today, replacing orally as needed, Mg++ level only 1.6, will give 2g IV and monitor    LFTs better today, continue to monitor    Has h/o CVA with mild residual non-dominant left hemiparesis, at baseline    Fell in room yesterday, CT head unremarkable, no other injuries reported      · Lovenox 40mg BID for DVT prophylaxis.  · Full code.  · Discussed with patient and nursing staff.  · Anticipate discharge back to SNU facility, PT onel noted,  timing yet to be determined.      Malcolm Hillman MD  [facility]  03/08/21  14:04 EST

## 2021-03-08 NOTE — PLAN OF CARE
"Pt initially refused therapy stating he was too tired and \"just didn't feel good.\" Encouraged patient on importance of mobility and continuing to monitor and accommodate to his orthostatic hypotension. Pt sat EOB with SBA and use of bed rails. Pt required Emily for sit<>stand with FWW. Standing tolerance <10-15 sec. Pt complaining of dizziness with positional changes. Did have severe orthostatic hypotension in standing-see below, however high anxiety and fear with standing also significant limiting factor. Pt returned to supine with CGA. Skilled PT needed to progress above impairments. DC recs include SNU/ANIVAL.      SUPINE: 145/71  SITTIN/93  STANDIN/69    .Patient was intermittently wearing a face mask during this therapy encounter. Therapist used appropriate personal protective equipment including eye protection, mask, and gloves.  Mask used was standard procedure mask. Appropriate PPE was worn during the entire therapy session. Hand hygiene was completed before and after therapy session. Patient is not in enhanced droplet precautions.    Problem: Adult Inpatient Plan of Care  Goal: Plan of Care Review  Outcome: Ongoing, Not Progressing  Flowsheets (Taken 3/8/2021 0430 by hCristine Morocho, RN)  Plan of Care Reviewed With: patient   Goal Outcome Evaluation:           "

## 2021-03-09 ENCOUNTER — APPOINTMENT (OUTPATIENT)
Dept: GENERAL RADIOLOGY | Facility: HOSPITAL | Age: 66
End: 2021-03-09

## 2021-03-09 LAB
ALBUMIN SERPL-MCNC: 2.8 G/DL (ref 3.5–5.2)
ALBUMIN/GLOB SERPL: 0.7 G/DL
ALP SERPL-CCNC: 74 U/L (ref 39–117)
ALT SERPL W P-5'-P-CCNC: 72 U/L (ref 1–41)
ANION GAP SERPL CALCULATED.3IONS-SCNC: 12.5 MMOL/L (ref 5–15)
AST SERPL-CCNC: 37 U/L (ref 1–40)
BACTERIA SPEC AEROBE CULT: NORMAL
BILIRUB SERPL-MCNC: 0.4 MG/DL (ref 0–1.2)
BUN SERPL-MCNC: 10 MG/DL (ref 8–23)
BUN/CREAT SERPL: 15.9 (ref 7–25)
CALCIUM SPEC-SCNC: 8.7 MG/DL (ref 8.6–10.5)
CHLORIDE SERPL-SCNC: 99 MMOL/L (ref 98–107)
CO2 SERPL-SCNC: 25.5 MMOL/L (ref 22–29)
CREAT SERPL-MCNC: 0.63 MG/DL (ref 0.76–1.27)
DEPRECATED RDW RBC AUTO: 45.1 FL (ref 37–54)
ERYTHROCYTE [DISTWIDTH] IN BLOOD BY AUTOMATED COUNT: 15.5 % (ref 12.3–15.4)
GFR SERPL CREATININE-BSD FRML MDRD: 129 ML/MIN/1.73
GLOBULIN UR ELPH-MCNC: 3.8 GM/DL
GLUCOSE BLDC GLUCOMTR-MCNC: 142 MG/DL (ref 70–130)
GLUCOSE BLDC GLUCOMTR-MCNC: 169 MG/DL (ref 70–130)
GLUCOSE BLDC GLUCOMTR-MCNC: 169 MG/DL (ref 70–130)
GLUCOSE BLDC GLUCOMTR-MCNC: 98 MG/DL (ref 70–130)
GLUCOSE SERPL-MCNC: 92 MG/DL (ref 65–99)
HCT VFR BLD AUTO: 46.7 % (ref 37.5–51)
HGB BLD-MCNC: 16 G/DL (ref 13–17.7)
MAGNESIUM SERPL-MCNC: 1.7 MG/DL (ref 1.6–2.4)
MCH RBC QN AUTO: 28.3 PG (ref 26.6–33)
MCHC RBC AUTO-ENTMCNC: 34.3 G/DL (ref 31.5–35.7)
MCV RBC AUTO: 82.5 FL (ref 79–97)
PLATELET # BLD AUTO: 283 10*3/MM3 (ref 140–450)
PMV BLD AUTO: 9.2 FL (ref 6–12)
POTASSIUM SERPL-SCNC: 3.6 MMOL/L (ref 3.5–5.2)
PROT SERPL-MCNC: 6.6 G/DL (ref 6–8.5)
RBC # BLD AUTO: 5.66 10*6/MM3 (ref 4.14–5.8)
SODIUM SERPL-SCNC: 137 MMOL/L (ref 136–145)
WBC # BLD AUTO: 12.65 10*3/MM3 (ref 3.4–10.8)

## 2021-03-09 PROCEDURE — 63710000001 PREDNISONE PER 1 MG: Performed by: INTERNAL MEDICINE

## 2021-03-09 PROCEDURE — 25010000002 MAGNESIUM SULFATE 2 GM/50ML SOLUTION: Performed by: HOSPITALIST

## 2021-03-09 PROCEDURE — 85027 COMPLETE CBC AUTOMATED: CPT | Performed by: HOSPITALIST

## 2021-03-09 PROCEDURE — 25010000002 ENOXAPARIN PER 10 MG: Performed by: HOSPITALIST

## 2021-03-09 PROCEDURE — 94799 UNLISTED PULMONARY SVC/PX: CPT

## 2021-03-09 PROCEDURE — 82962 GLUCOSE BLOOD TEST: CPT

## 2021-03-09 PROCEDURE — 92611 MOTION FLUOROSCOPY/SWALLOW: CPT | Performed by: SPEECH-LANGUAGE PATHOLOGIST

## 2021-03-09 PROCEDURE — 83735 ASSAY OF MAGNESIUM: CPT | Performed by: HOSPITALIST

## 2021-03-09 PROCEDURE — 74230 X-RAY XM SWLNG FUNCJ C+: CPT

## 2021-03-09 PROCEDURE — 97530 THERAPEUTIC ACTIVITIES: CPT

## 2021-03-09 PROCEDURE — 25010000002 PIPERACILLIN SOD-TAZOBACTAM PER 1 G: Performed by: HOSPITALIST

## 2021-03-09 PROCEDURE — 80053 COMPREHEN METABOLIC PANEL: CPT | Performed by: HOSPITALIST

## 2021-03-09 RX ORDER — AMOXICILLIN AND CLAVULANATE POTASSIUM 875; 125 MG/1; MG/1
1 TABLET, FILM COATED ORAL EVERY 12 HOURS SCHEDULED
Status: COMPLETED | OUTPATIENT
Start: 2021-03-09 | End: 2021-03-10

## 2021-03-09 RX ADMIN — PREDNISONE 40 MG: 20 TABLET ORAL at 08:38

## 2021-03-09 RX ADMIN — BUDESONIDE AND FORMOTEROL FUMARATE DIHYDRATE 2 PUFF: 80; 4.5 AEROSOL RESPIRATORY (INHALATION) at 08:51

## 2021-03-09 RX ADMIN — Medication 1 APPLICATION: at 08:39

## 2021-03-09 RX ADMIN — BUSPIRONE HYDROCHLORIDE 15 MG: 15 TABLET ORAL at 22:08

## 2021-03-09 RX ADMIN — TAZOBACTAM SODIUM AND PIPERACILLIN SODIUM 3.38 G: 375; 3 INJECTION, SOLUTION INTRAVENOUS at 05:39

## 2021-03-09 RX ADMIN — ATORVASTATIN CALCIUM 20 MG: 20 TABLET, FILM COATED ORAL at 22:03

## 2021-03-09 RX ADMIN — METOPROLOL TARTRATE 50 MG: 50 TABLET, FILM COATED ORAL at 22:03

## 2021-03-09 RX ADMIN — ENOXAPARIN SODIUM 40 MG: 40 INJECTION SUBCUTANEOUS at 08:37

## 2021-03-09 RX ADMIN — Medication 1 APPLICATION: at 22:03

## 2021-03-09 RX ADMIN — NYSTATIN: 100000 POWDER TOPICAL at 08:39

## 2021-03-09 RX ADMIN — BARIUM SULFATE 4 ML: 980 POWDER, FOR SUSPENSION ORAL at 15:29

## 2021-03-09 RX ADMIN — IPRATROPIUM BROMIDE AND ALBUTEROL SULFATE 3 ML: 2.5; .5 SOLUTION RESPIRATORY (INHALATION) at 12:15

## 2021-03-09 RX ADMIN — BUSPIRONE HYDROCHLORIDE 15 MG: 15 TABLET ORAL at 08:38

## 2021-03-09 RX ADMIN — ALLOPURINOL 200 MG: 100 TABLET ORAL at 08:38

## 2021-03-09 RX ADMIN — METOPROLOL TARTRATE 50 MG: 50 TABLET, FILM COATED ORAL at 08:38

## 2021-03-09 RX ADMIN — TAZOBACTAM SODIUM AND PIPERACILLIN SODIUM 3.38 G: 375; 3 INJECTION, SOLUTION INTRAVENOUS at 14:41

## 2021-03-09 RX ADMIN — NYSTATIN: 100000 POWDER TOPICAL at 22:09

## 2021-03-09 RX ADMIN — MONTELUKAST SODIUM 10 MG: 10 TABLET, FILM COATED ORAL at 22:02

## 2021-03-09 RX ADMIN — DIVALPROEX SODIUM 250 MG: 250 TABLET, DELAYED RELEASE ORAL at 22:04

## 2021-03-09 RX ADMIN — IPRATROPIUM BROMIDE AND ALBUTEROL SULFATE 3 ML: 2.5; .5 SOLUTION RESPIRATORY (INHALATION) at 16:58

## 2021-03-09 RX ADMIN — CLOPIDOGREL 75 MG: 75 TABLET, FILM COATED ORAL at 08:38

## 2021-03-09 RX ADMIN — LEVOTHYROXINE SODIUM 25 MCG: 0.03 TABLET ORAL at 05:43

## 2021-03-09 RX ADMIN — PANTOPRAZOLE SODIUM 40 MG: 40 TABLET, DELAYED RELEASE ORAL at 08:39

## 2021-03-09 RX ADMIN — SODIUM CHLORIDE, PRESERVATIVE FREE 10 ML: 5 INJECTION INTRAVENOUS at 22:05

## 2021-03-09 RX ADMIN — BUDESONIDE AND FORMOTEROL FUMARATE DIHYDRATE 2 PUFF: 80; 4.5 AEROSOL RESPIRATORY (INHALATION) at 19:15

## 2021-03-09 RX ADMIN — BUPROPION HYDROCHLORIDE 450 MG: 150 TABLET, EXTENDED RELEASE ORAL at 08:38

## 2021-03-09 RX ADMIN — BUSPIRONE HYDROCHLORIDE 15 MG: 15 TABLET ORAL at 17:05

## 2021-03-09 RX ADMIN — ENOXAPARIN SODIUM 40 MG: 40 INJECTION SUBCUTANEOUS at 22:03

## 2021-03-09 RX ADMIN — AMOXICILLIN AND CLAVULANATE POTASSIUM 1 TABLET: 875; 125 TABLET, FILM COATED ORAL at 22:03

## 2021-03-09 RX ADMIN — DIVALPROEX SODIUM 250 MG: 250 TABLET, DELAYED RELEASE ORAL at 08:38

## 2021-03-09 RX ADMIN — LISINOPRIL 2.5 MG: 2.5 TABLET ORAL at 08:38

## 2021-03-09 RX ADMIN — MAGNESIUM SULFATE 2 G: 2 INJECTION INTRAVENOUS at 10:30

## 2021-03-09 RX ADMIN — ASPIRIN 81 MG: 81 TABLET, COATED ORAL at 08:38

## 2021-03-09 RX ADMIN — SODIUM CHLORIDE 500 ML: 9 INJECTION, SOLUTION INTRAVENOUS at 05:39

## 2021-03-09 RX ADMIN — BARIUM SULFATE 55 ML: 0.81 POWDER, FOR SUSPENSION ORAL at 15:29

## 2021-03-09 NOTE — PROGRESS NOTES
[facility]  [phone]  Rodger Polanco MD    Subjective:  LOS: 5    No new respiratory complaints.  Eager to go home.    Objective   Vital Signs past 24hrs    Temp range: Temp (24hrs), Av.9 °F (36.6 °C), Min:97.7 °F (36.5 °C), Max:98.1 °F (36.7 °C)    BP range: BP: (142-143)/(67-87) 143/87  Pulse range: Heart Rate:  [63-82] 72  Resp rate range: Resp:  [18-24] 18    Device (Oxygen Therapy): room air   Oxygen range:SpO2:  [91 %-93 %] 92 %      125 kg (276 lb 0.3 oz); Body mass index is 43.22 kg/m².    Intake/Output Summary (Last 24 hours) at 3/9/2021 1058  Last data filed at 3/9/2021 0539  Gross per 24 hour   Intake 620 ml   Output 675 ml   Net -55 ml       Physical Exam  Constitutional:       Appearance: He is obese.   HENT:      Mouth/Throat:      Mouth: Mucous membranes are moist.   Eyes:      Pupils: Pupils are equal, round, and reactive to light.   Cardiovascular:      Rate and Rhythm: Normal rate and regular rhythm.      Heart sounds: No murmur.   Pulmonary:      Effort: Pulmonary effort is normal.      Breath sounds: Decreased breath sounds present. No wheezing.   Abdominal:      General: Bowel sounds are normal.      Palpations: Abdomen is soft. There is no mass.      Tenderness: There is no abdominal tenderness.   Musculoskeletal:         General: No swelling.      Cervical back: Neck supple.   Skin:     General: Skin is warm.   Neurological:      Comments: Left hemiparesis      exam limited by morbid obesity  Results Review:    I have reviewed the laboratory and imaging data since the last note by [facility] physician.  My annotations are noted in assessment and plan.    Medication Review:  I have reviewed the current MAR.  My annotations are noted in assessment and plan.       Plan   PCCM Problems  Acute hypoxic respiratory failure; severe and worsening  Noninvasive ventilator use  Acute metabolic encephalopathy  Concern for aspiration pneumonia  Asthma/COPD exacerbation  Active tobacco  abuse  COVID-19 ruled out x2  Relevant Medical Diagnoses  CVA with left hemiparesis  MITZI,  Morbid obesity  Chronic benzodiazepine use  Baseline cognitive defect/dementia        Plan of Treatment  Tolerating room air.    I reviewed his CT chest and he has extensive groundglass infiltrates in both lungs.  However also limited by morbid obesity and interpreting the chest film.  So far all infectious markers are negative. CxR today shows minimal parenchymal infiltrates. Needs fu with pulmonary.    Consideration may be organizing pneumonia.  Currently on 40 mg prednisone and appears clinically better. Continue present dose for ONE MONTH then taper off. Needs fu with pulmonary.    Apparent exacerbation of COPD/asthma.  Presently not wheezing but limited exam due to morbid obesity.  On steroids as above.    His recent swallow eval does not confirm the diagnosis of dysphagia and he was permitted regular diet.    Consider bronchoscopy if fails to improve.  However will be a high risk procedure with his morbid obesity.    Okay discharge per us.  [facility]  If he would like follow-up with us, please make appointment with Dr. Vivas.    Rodger Polanco MD  03/09/21  10:58 EST    While in the room and during my examination of the patient I wore gloves, gown, mask, eye protection and followed enhanced droplet/contact isolation protocol and precautions.  I washed my hands before and after this patient encounter.    Part of this note may be an electronic transcription/translation of spoken language to printed text using the Dragon Dictation System.

## 2021-03-09 NOTE — NURSING NOTE
Telemetry resumed at this time. Still refusing IV and O2 monitoring. Multiple RN's have attempted, including CN. Mag, Zosyn, and 500cc bolus not given. juliann.

## 2021-03-09 NOTE — THERAPY TREATMENT NOTE
Patient Name: Garcia Garcia  : 1955    MRN: [id number]                              Today's Date: 3/9/2021       Admit Date: 3/4/2021    Visit Dx:     ICD-10-CM ICD-9-CM   1. Pneumonia due to infectious organism, unspecified laterality, unspecified part of lung  J18.9 486   2. Hypoxia  R09.02 799.02     Patient Active Problem List   Diagnosis   • Pneumonia due to infectious organism   • MITZI (obstructive sleep apnea)   • Severe depression (CMS/[facility])   • HLD (hyperlipidemia)   • HTN (hypertension)   • History of stroke   • GERD without esophagitis   • Diastolic CHF, chronic (CMS/[facility])   • COPD (chronic obstructive pulmonary disease) (CMS/[facility])   • Dementia (CMS/[facility])   • Lactic acidosis   • Leukocytosis   • Elevated d-dimer   • Type 2 diabetes mellitus with diabetic neuropathy, unspecified (CMS/[facility])   • Acute respiratory failure with hypoxia (CMS/[facility])   • Vitamin D deficiency   • Hypokalemia   • Orthostatic hypotension     Past Medical History:   Diagnosis Date   • Acidosis    • Arthritis    • Asthma    • Atherosclerotic heart disease of native coronary artery without angina pectoris    • CHF (congestive heart failure) (CMS/[facility])    • Chronic obstructive pulmonary disease, unspecified (CMS/[facility])    • Cognitive communication deficit    • Cyst of kidney, acquired    • Elevated white blood cell count, unspecified    • Essential (primary) hypertension    • GERD (gastroesophageal reflux disease)    • Hemiplegia and hemiparesis following cerebral infarction affecting left non-dominant side (CMS/[facility])    • Hyperlipidemia, unspecified    • Major depressive disorder, recurrent, severe with psychotic symptoms (CMS/[facility])    • Muscle weakness (generalized)    • Other specified metabolic disorders (CMS/[facility])    • Secondary polycythemia    • Sepsis due to Streptococcus pneumoniae (CMS/[facility])    • Sleep apnea, unspecified    • Tachycardia, unspecified    • Toxic encephalopathy    • Type 2 diabetes mellitus with diabetic neuropathy,  unspecified (CMS/[facility])    • Type 2 diabetes mellitus with hypoglycemia without coma (CMS/[facility])    • Unspecified dementia with behavioral disturbance (CMS/[facility])      Past Surgical History:   Procedure Laterality Date   • KNEE SURGERY       General Information     Row Name 03/09/21 1310          Physical Therapy Time and Intention    Document Type  therapy note (daily note)  -AE     Mode of Treatment  individual therapy;physical therapy  -AE     Row Name 03/09/21 1310          General Information    Patient Profile Reviewed  yes  -AE       User Key  (r) = Recorded By, (t) = Taken By, (c) = Cosigned By    Initials Name Provider Type    AE Ángela Melvin PT Physical Therapist        Mobility     Row Name 03/09/21 1312          Bed Mobility    Bed Mobility  bed mobility (all) activities  -AE     All Activities, [place] (Bed Mobility)  contact guard assist;1 person assist  -AE     Assistive Device (Bed Mobility)  bed rails  -AE     Row Name 03/09/21 1312          Transfers    Comment (Transfers)  Emily sit<>stand with FWW;requires CGA to maintain upright sitting  -AE     Row Name 03/09/21 1312          Bed-Chair Transfer    Bed-Chair [place] (Transfers)  not tested  -AE     Row Name 03/09/21 1312          Sit-Stand Transfer    Sit-Stand [place] (Transfers)  minimum assist (75% patient effort);1 person assist  -AE     Assistive Device (Sit-Stand Transfers)  walker, front-wheeled  -AE     Row Name 03/09/21 1312          Gait/Stairs (Locomotion)    Distance in Feet (Gait)  pt refused  -AE       User Key  (r) = Recorded By, (t) = Taken By, (c) = Cosigned By    Initials Name Provider Type    AE Ángela Melvin PT Physical Therapist        Obj/Interventions    No documentation.       Goals/Plan    No documentation.       Clinical Impression     Row Name 03/09/21 1312          Pain    Additional Documentation  Pain Scale: Numbers Pre/Post-Treatment (Group)  -AE     Row Name 03/09/21 1312          Pain Scale:  Numbers Pre/Post-Treatment    Pretreatment Pain Rating  2/10  -AE     Posttreatment Pain Rating  2/10  -AE     Pain Location - Orientation  lower  -AE     Pain Location  back  -AE     Pain Intervention(s)  Repositioned;Rest  -AE     Row Name 03/09/21 1312          Positioning and Restraints    Pre-Treatment Position  in bed  -AE     Post Treatment Position  bed  -AE     In Bed  side lying left;call light within reach;encouraged to call for assist;exit alarm on;notified nsg  -AE       User Key  (r) = Recorded By, (t) = Taken By, (c) = Cosigned By    Initials Name Provider Type    AE Ángela Melvin PT Physical Therapist        Outcome Measures     Row Name 03/09/21 1313          Functional Assessment    Outcome Measure Options  AM-PAC 6 Clicks Basic Mobility (PT)  -AE       User Key  (r) = Recorded By, (t) = Taken By, (c) = Cosigned By    Initials Name Provider Type    AE Ángela Melvin PT Physical Therapist        Physical Therapy Education                 Title: PT OT SLP Therapies (In Progress)     Topic: Physical Therapy (Done)     Point: Mobility training (Done)     Learning Progress Summary           Patient Acceptance, E,TB, VU,NR by AE at 3/5/2021 1312                   Point: Home exercise program (Done)     Learning Progress Summary           Patient Acceptance, E,TB, VU,NR by AE at 3/5/2021 1312                   Point: Body mechanics (Done)     Learning Progress Summary           Patient Acceptance, E,TB, VU,NR by AE at 3/5/2021 1312                   Point: Precautions (Done)     Learning Progress Summary           Patient Acceptance, E,TB, VU,NR by AE at 3/5/2021 1312                               User Key     Initials Effective Dates Name Provider Type Discipline    AE 09/04/19 -  Ángela Melvin PT Physical Therapist PT              PT Recommendation and Plan  Planned Therapy Interventions (PT): balance training, bed mobility training, gait training, home exercise program, manual therapy  techniques, motor coordination training, neuromuscular re-education, patient/family education, postural re-education, ROM (range of motion), stair training, strengthening, transfer training        Time Calculation:   PT Charges     Row Name 03/09/21 1320 03/09/21 1213          Time Calculation    Start Time  1030  -AE  --     Stop Time  1100  -AE  --     Time Calculation (min)  30 min  -AE  --     PT Received On  --  03/09/21  -AE     PT - Next Appointment  --  03/10/21  -AE        Time Calculation- PT    Total Timed Code Minutes- PT  30 minute(s)  -AE  --       User Key  (r) = Recorded By, (t) = Taken By, (c) = Cosigned By    Initials Name Provider Type    AE Ángela Melvin, ALEJA Physical Therapist        Therapy Charges for Today     Code Description Service Date Service Provider Modifiers Qty    62808910123  PT THERAPEUTIC ACT EA 15 MIN 3/8/2021 Ángela Melvin, PT GP 1    74355994853 HC PT THER SUPP EA 15 MIN 3/8/2021 Ángela Melvin, PT GP 1    58222854346 HC PT THERAPEUTIC ACT EA 15 MIN 3/9/2021 Ángela Melvin, PT GP 2    22123667789 HC PT THER SUPP EA 15 MIN 3/9/2021 Ángela Melvin, PT GP 2          PT G-Codes  Outcome Measure Options: AM-PAC 6 Clicks Basic Mobility (PT)  AM-PAC 6 Clicks Score (PT): 13  AM-PAC 6 Clicks Score (OT): 15    Ángela Melvin PT  3/9/2021

## 2021-03-09 NOTE — NURSING NOTE
Care resumed at 0600 after allowing this RN to place telemetry and IV. 500cc and zosyn infusing at this time. Will pass on to day shift RN that mg 2g still needs to be administered.

## 2021-03-09 NOTE — PROGRESS NOTES
Continued Stay Note  [facility]     Patient Name: Garcia Garcia  MRN: [id number]  Today's Date: 3/9/2021    Admit Date: 3/4/2021    Discharge Plan     Row Name 03/09/21 1408       Plan    Plan Comments  Spoke with Lakshmi SOARES, to update that patient appears to be nearing dc.  She states that she wants to get the patient to [place].  Explained that PT expressed concerns because the patient is weak and may not be able to remain seated that long.  Lakshmi verbalized understanding and would like MD to call her.  Sticky note left for MD. Philomena Jarrett RN        Discharge Codes    No documentation.       Expected Discharge Date and Time     Expected Discharge Date Expected Discharge Time    Mar 8, 2021             Philomena Jarrett RN

## 2021-03-09 NOTE — NURSING NOTE
Patient agitated, verbally aggressive, and refusing care. He is confused about situation and medications. Refusing IV placement after removing multiple IV's today. Unable to administer abx, mag, and 500cc bolus. Demanding certain respiratory meds that are not on his chart and he has not been getting since admission. Page to on call.

## 2021-03-09 NOTE — MBS/VFSS/FEES
Acute Care - Speech Language Pathology   Swallow Initial Evaluation [facility]     Patient Name: Garcia Garcia  : 1955  MRN: [id number]  Today's Date: 3/9/2021               Admit Date: 3/4/2021    Visit Dx:     ICD-10-CM ICD-9-CM   1. Pneumonia due to infectious organism, unspecified laterality, unspecified part of lung  J18.9 486   2. Hypoxia  R09.02 799.02     Patient Active Problem List   Diagnosis   • Pneumonia due to infectious organism   • MITZI (obstructive sleep apnea)   • Severe depression (CMS/[facility])   • HLD (hyperlipidemia)   • HTN (hypertension)   • History of stroke   • GERD without esophagitis   • Diastolic CHF, chronic (CMS/[facility])   • COPD (chronic obstructive pulmonary disease) (CMS/[facility])   • Dementia (CMS/[facility])   • Lactic acidosis   • Leukocytosis   • Elevated d-dimer   • Type 2 diabetes mellitus with diabetic neuropathy, unspecified (CMS/[facility])   • Acute respiratory failure with hypoxia (CMS/[facility])   • Vitamin D deficiency   • Hypokalemia   • Orthostatic hypotension     Past Medical History:   Diagnosis Date   • Acidosis    • Arthritis    • Asthma    • Atherosclerotic heart disease of native coronary artery without angina pectoris    • CHF (congestive heart failure) (CMS/[facility])    • Chronic obstructive pulmonary disease, unspecified (CMS/[facility])    • Cognitive communication deficit    • Cyst of kidney, acquired    • Elevated white blood cell count, unspecified    • Essential (primary) hypertension    • GERD (gastroesophageal reflux disease)    • Hemiplegia and hemiparesis following cerebral infarction affecting left non-dominant side (CMS/[facility])    • Hyperlipidemia, unspecified    • Major depressive disorder, recurrent, severe with psychotic symptoms (CMS/[facility])    • Muscle weakness (generalized)    • Other specified metabolic disorders (CMS/[facility])    • Secondary polycythemia    • Sepsis due to Streptococcus pneumoniae (CMS/[facility])    • Sleep apnea, unspecified    • Tachycardia, unspecified    • Toxic  "encephalopathy    • Type 2 diabetes mellitus with diabetic neuropathy, unspecified (CMS/HCC)    • Type 2 diabetes mellitus with hypoglycemia without coma (CMS/HCC)    • Unspecified dementia with behavioral disturbance (CMS/HCC)      Past Surgical History:   Procedure Laterality Date   • KNEE SURGERY          SWALLOW EVALUATION (last 72 hours)      SLP Adult Swallow Evaluation     Row Name 03/09/21 1515 03/08/21 1030                Rehab Evaluation    Document Type  evaluation  -SA  re-evaluation  -OC       Subjective Information  no complaints  -SA  no complaints  -OC       Patient Observations  alert;cooperative  -SA  alert;cooperative;agree to therapy c/o bloody nose this AM  -OC       Care Plan Review  --  evaluation/treatment results reviewed  -OC       Patient Effort  good  -SA  good  -OC       Symptoms Noted During/After Treatment  none  -SA  none  -OC          General Information    Patient Profile Reviewed  yes  -SA  yes  -OC       Pertinent History Of Current Problem  --  per MD notes \"recurrent PNA\"  -OC       Current Method of Nutrition  regular textures;thin liquids  -SA  regular textures;thin liquids  -OC       Precautions/Limitations, Vision  WFL;for purposes of eval  -SA  WFL;for purposes of eval  -OC       Precautions/Limitations, Hearing  WFL;for purposes of eval  -SA  WFL;for purposes of eval  -OC       Prior Level of Function-Communication  WFL  -SA  WFL  -OC       Prior Level of Function-Swallowing  no diet consistency restrictions  -SA  no diet consistency restrictions  -OC       Plans/Goals Discussed with  patient;agreed upon  -SA  patient;agreed upon  -OC       Barriers to Rehab  none identified  -SA  none identified  -OC       Patient's Goals for Discharge  patient did not state  -SA  patient did not state  -OC          Pain Scale: Numbers Pre/Post-Treatment    Pretreatment Pain Rating  --  0/10 - no pain  -OC       Posttreatment Pain Rating  --  0/10 - no pain  -OC          Oral Motor " Structure and Function    Dentition Assessment  --  missing teeth  -OC       Secretion Management  --  WNL/WFL  -OC       Mucosal Quality  --  moist, healthy  -OC       Volitional Swallow  --  WFL  -OC          Oral Musculature and Cranial Nerve Assessment    Oral Motor General Assessment  --  WFL  -OC          Clinical Swallow Eval    Clinical Swallow Evaluation Summary  --  Re-eval completed. Patient demonstrated delayed cough X1 s/p thin liquids via cup. No overt s/s aspiration n subsequent trials of thins, puree, mechanical soft, and regular textures.  Patient agreeable to VFSS to r/o aspiration.   -OC          MBS/VFSS    Utensils Used  spoon;cup;straw  -SA  --       Consistencies Trialed  regular textures;soft textures;mixed consistency;thin liquids  -SA  --          MBS/VFSS Interpretation    Oral Prep Phase  impaired oral phase of swallowing  -SA  --       Oral Transit Phase  impaired  -SA  --       Oral Residue  WFL  -SA  --          Oral Preparatory Phase    Oral Preparatory Phase  other (see comments);prolonged manipulation reduced mastication  -SA  --       Prolonged Manipulation  regular textures;other (see comments);mechanical soft;mixed consistency  -SA  --          Oral Transit Phase    Impaired Oral Transit Phase  premature spillage of liquids into pharynx  -SA  --       Premature Spillage of Liquids into Pharynx  mechanical soft;mixed consistency  -SA  --          Initiation of Pharyngeal Swallow    Pharyngeal Phase  impaired pharyngeal phase of swallowing  -SA  --       Penetration During the Swallow  thin liquids;other (see comments) consecutive straw only  -SA  --       Pharyngeal Residue  valleculae;regular textures  -SA  --       Response to Residue  cleared residue with spontaneous subsequent swallow  -SA  --          Esophageal Phase    Esophageal Phase  other (see comments) incomplete clearance of esophagus  -SA  --          SLP Communication to Radiology    Summary Statement  Patient  demonstrates a mild oropharyngeal swallow characterized by deep penetration of consecutive straw sips of thin liquids.  No penetration when taken by single sips or solids.  Mild to moderate vallecular residue with dry solid which cleared with second swallow.  Esophageal scan showed incomplete esophageal clearance.   -SA  --          Clinical Impression    SLP Swallowing Diagnosis  mild  -SA  R/O pharyngeal dysphagia  -OC       Functional Impact  risk of aspiration/pneumonia  -SA  risk of aspiration/pneumonia  -OC       Rehab Potential/Prognosis, Swallowing  good, to achieve stated therapy goals  -SA  good, to achieve stated therapy goals  -OC       Swallow Criteria for Skilled Therapeutic Interventions Met  demonstrates skilled criteria  -SA  demonstrates skilled criteria  -OC          Recommendations    Therapy Frequency (Swallow)  PRN  -SA  PRN  -OC       Predicted Duration Therapy Intervention (Days)  until discharge  -SA  until discharge  -OC       SLP Diet Recommendation  regular textures;thin liquids  -SA  regular textures;thin liquids  -OC       Recommended Diagnostics  --  VFSS (MBS)  -OC       Recommended Precautions and Strategies  upright posture during/after eating;small bites of food and sips of liquid;multiple swallows per bite of food;other (see comments) 1 sip at a time  -SA  small bites of food and sips of liquid;upright posture during/after eating  -OC       Oral Care Recommendations  Oral Care before breakfast, after meals and PRN  -SA  Oral Care BID/PRN  -OC       SLP Rec. for Method of Medication Administration  meds whole;with pudding or applesauce;with thin liquids if thin taken in single sips  -SA  meds whole;with thin liquids;with pudding or applesauce;as tolerated  -OC       Monitor for Signs of Aspiration  notify SLP if any concerns  -SA  yes;notify SLP if any concerns  -OC       Anticipated Discharge Disposition (SLP)  unknown  -SA  skilled nursing facility  -OC       Demonstrates Need  for Referral to Another Service  other (see comments) further assessment of structure/function of esophagus  -SA  --          Swallow Goals (SLP)    Oral Nutrition/Hydration Goal Selection (SLP)  oral nutrition/hydration, SLP goal 1  -SA  --          Oral Nutrition/Hydration Goal 1 (SLP)    Oral Nutrition/Hydration Goal 1, SLP  Pt will safely tolerate the least restrictive diet with no s/s of aspiration.  -SA  Pt will safely tolerate the least restrictive diet with no s/s of aspiration.  -OC       Time Frame (Oral Nutrition/Hydration Goal 1, SLP)  by discharge  -SA  by discharge  -OC         User Key  (r) = Recorded By, (t) = Taken By, (c) = Cosigned By    Initials Name Effective Dates    SA Tigist Alvarado MS [facility]-SLP 03/07/18 -     OC Laura Jenkins MA,[facility]-SLP 06/08/18 -           EDUCATION  The patient has been educated in the following areas:   Dysphagia (Swallowing Impairment).    SLP Recommendation and Plan  SLP Swallowing Diagnosis: mild  SLP Diet Recommendation: regular textures, thin liquids  Recommended Precautions and Strategies: upright posture during/after eating, small bites of food and sips of liquid, multiple swallows per bite of food, other (see comments) (1 sip at a time)  SLP Rec. for Method of Medication Administration: meds whole, with pudding or applesauce, with thin liquids (if thin taken in single sips)     Monitor for Signs of Aspiration: notify SLP if any concerns     Swallow Criteria for Skilled Therapeutic Interventions Met: demonstrates skilled criteria  Anticipated Discharge Disposition (SLP): unknown  Rehab Potential/Prognosis, Swallowing: good, to achieve stated therapy goals  Therapy Frequency (Swallow): PRN  Predicted Duration Therapy Intervention (Days): until discharge  Demonstrates Need for Referral to Another Service: other (see comments) (further assessment of structure/function of esophagus)                      Plan of Care Reviewed With: patient  Outcome Summary: VFSS  completed.  Pt demonstrated deep penetration of thin liquids by consecutive straw sips. No aspiration observed.  Reduced, but adequate mastication with mild to moderate vallecular residue noted with dry solids which cleared with second swallow.  Esophageal scan showed incomplete clearance of esophagus.  REC continue regular diet, thin liquids.  meds w/ single sip of thin or puree.  Upright during and 30 mins after all meals. One sip at a time.  Double swallow with drier consistencies.  If concerned with aspiration, consider further evaluation of structure and function of the esophagus.    SLP GOALS     Row Name 03/09/21 1515 03/08/21 1030          Oral Nutrition/Hydration Goal 1 (SLP)    Oral Nutrition/Hydration Goal 1, SLP  Pt will safely tolerate the least restrictive diet with no s/s of aspiration.  -SA  Pt will safely tolerate the least restrictive diet with no s/s of aspiration.  -OC     Time Frame (Oral Nutrition/Hydration Goal 1, SLP)  by discharge  -SA  by discharge  -OC       User Key  (r) = Recorded By, (t) = Taken By, (c) = Cosigned By    Initials Name Provider Type    Tigist Pereira MS CCC-SLP Speech and Language Pathologist     Laura Jenkins MA,[facility]-SLP Speech and Language Pathologist           SLP Outcome Measures (last 72 hours)      SLP Outcome Measures     Row Name 03/09/21 1700 03/08/21 1300          SLP Outcome Measures    Outcome Measure Used?  --  Adult NOMS  -OC        Adult FCM Scores    FCM Chosen  Swallowing  -SA  Swallowing  -OC     Swallowing FCM Score  6  -SA  6  -OC       User Key  (r) = Recorded By, (t) = Taken By, (c) = Cosigned By    Initials Name Effective Dates    Tigist Pereira MS [facility]-SLP 03/07/18 -      [facility] 06/08[facility]18 -            Time Calculation:   Time Calculation- SLP     Row Name 03/09/21 1748             Time Calculation- SLP    SLP Start Time  1515  -SA      SLP Received On  03/09/21  -        User Key  (r) = Recorded By, (t) = Taken By,  (c) = Cosigned By    Initials Name Provider Type    Tigist Pereira MS CCC-SLP Speech and Language Pathologist          Therapy Charges for Today     Code Description Service Date Service Provider Modifiers Qty    [id number] HC ST MOTION FLUORO EVAL SWALLOW 5 3/9/2021 Tigist Alvarado MS CCC-SLP GN 1               Tigist Alvarado MS CCC-SLP  3/9/2021

## 2021-03-09 NOTE — PLAN OF CARE
Goal Outcome Evaluation:  Plan of Care Reviewed With: patient     Outcome Summary: VFSS completed.  Pt demonstrated deep penetration of thin liquids by consecutive straw sips. No aspiration observed.  Reduced, but adequate mastication with mild to moderate vallecular residue noted with dry solids which cleared with second swallow.  Esophageal scan showed incomplete clearance of esophagus.  REC continue regular diet, thin liquids.  meds w/ single sip of thin or puree.  Upright during and 30 mins after all meals. One sip at a time.  Double swallow with drier consistencies.  If concerned with aspiration, consider further evaluation of structure and function of the esophagus.

## 2021-03-09 NOTE — PLAN OF CARE
Goal Outcome Evaluation:        Outcome Summary: Pt on room air.  Remains very orthostatic.  He is more agreeable and cooperative with care today.  Denies pain or discomfort.  Pt family finally in agreement to d/c patient to rehab rather than fly to [place].  VSS, RA, NSR.

## 2021-03-09 NOTE — PLAN OF CARE
Pt oriented x 3 during therapy session, however is extremely self limiting and have been unable to progress any gait training due to high fear of falling and symptomatic orthostatic hypotension. Pt CGA for bed mobility, Emily for sit<>stand transfer and CGA for standing transfer. As soon as he  stands up he's asking to sit back down. With distraction and encouragmeent, pt able to stand x 60 sec before pt had uncontrolled descent back down onto bed. Pt still having severe symptomatic orthostatic hypotension impacting all mobility, see below:    Supine: 142/74  Sittin/68  Standin/52  Sitting 141/92  Supine: 152/98    DC recs SNU/ANIVAL. Pt with very low activity tolerance, refusing to sit up in chair and/or bed for meals, would not tolerate plane ride out to family in [place], unsafe for gait training at this time.  Problem: Adult Inpatient Plan of Care  Goal: Plan of Care Review  Outcome: Ongoing, Progressing  Flowsheets (Taken 3/8/2021 1325 by Chio Unger OTR)  Plan of Care Reviewed With: patient   Goal Outcome Evaluation:

## 2021-03-09 NOTE — PLAN OF CARE
Problem: Fluid Imbalance (Pneumonia)  Goal: Fluid Balance  Outcome: Ongoing, Progressing     Problem: Infection (Pneumonia)  Goal: Resolution of Infection Signs and Symptoms  Outcome: Ongoing, Progressing       Goal Outcome Evaluation:  Plan of Care Reviewed With: patient  Progress: no change  Outcome Summary: Remains on room air. Much more confused and agitated than he was th, f, sat night. This am he woke up and was willing to cooperate with this RN more, allowed me to place an IV at about 0600, and hook him up to telemetry. 500cc bolus now infusing and zosyn. He remains unsure of where he is or why he is here. No more diarrhea noted this shift. wctm.

## 2021-03-09 NOTE — PROGRESS NOTES
Continued Stay Note  [facility]     Patient Name: Garcia Garcia  MRN: [id number]  Today's Date: 3/9/2021    Admit Date: 3/4/2021    Discharge Plan     Row Name 03/09/21 7436       Plan    Plan Comments  Spoke with daughter, Lakshmi, and after speaking with the MD.  She wants the patient to got to [facility], but does not want [facility].  She is not familiar with any SNF facilities and has requested that a list be sent to her.  Will e-mail list to her @ [email or web address]. Will follow up in AM for facility choices. Philomena Jarrett RN    Row Name 03/09/21 5696       Plan    Plan Comments  Spoke with POA, Lakshmi, to update that patient appears to be nearing [place].  She states that she wants to get the patient to [place].  Explained that PT expressed concerns because the patient is weak and may not be able to remain seated that long.  Lakshmi verbalized understanding and would like MD to call her.  Sticky note left for MD. Philomena Jarrett RN        Discharge Codes    No documentation.       Expected Discharge Date and Time     Expected Discharge Date Expected Discharge Time    Mar 8, 2021             Philomena Jarrett RN

## 2021-03-09 NOTE — PROGRESS NOTES
Name: Garcia Garcia ADMIT: 3/4/2021   : 1955  PCP: Vragas Ling MD    MRN: [id number] LOS: 5 days   AGE/SEX: 65 y.o. male  ROOM: [facility]   Subjective   Chief Complaint   Patient presents with   • Back Pain     Dyspnea better  On ra  On IV abx  On steroids  BG being monitored    ROS  No f/c  No n/v  No cp/palp  Mild soa/cough    Objective   Vital Signs  Temp:  [97.7 °F (36.5 °C)-98.1 °F (36.7 °C)] 97.8 °F (36.6 °C)  Heart Rate:  [63-82] 76  Resp:  [18-24] 18  BP: (142-143)/(67-87) 143/74  SpO2:  [91 %-98 %] 91 %  on   ;   Device (Oxygen Therapy): room air  Body mass index is 43.22 kg/m².    Chronically ill  Physical Exam  Constitutional:       Appearance: He is well-developed. He is obese.   HENT:      Head: Normocephalic and atraumatic.   Eyes:      General: No scleral icterus.  Cardiovascular:      Rate and Rhythm: Normal rate and regular rhythm.      Heart sounds: Normal heart sounds.   Pulmonary:      Effort: Pulmonary effort is normal. No respiratory distress (decreased bs at bases).   Abdominal:      General: There is no distension.      Palpations: Abdomen is soft.      Tenderness: There is no abdominal tenderness.   Musculoskeletal:      Cervical back: Neck supple.   Psychiatric:         Behavior: Behavior normal.         Results Review:       I reviewed the patient's new clinical results.  Results from last 7 days   Lab Units 21  0536 21  0607 21  0550 21  0551   WBC 10*3/mm3 12.65* 10.03 10.59 11.95*   HEMOGLOBIN g/dL 16.0 14.2 13.4 13.6   PLATELETS 10*3/mm3 283 281 259 293     Results from last 7 days   Lab Units 21  0535 21  0607 21  0550 21  0551   SODIUM mmol/L 137 138 134* 135*   POTASSIUM mmol/L 3.6 3.6 3.6 3.4*   CHLORIDE mmol/L 99 100 99 99   CO2 mmol/L 25.5 27.9 26.4 26.2   BUN mg/dL 10 9 13 20   CREATININE mg/dL 0.63* 0.58* 0.58* 0.70*   GLUCOSE mg/dL 92 93 91 98   Estimated Creatinine Clearance: 152.3 mL/min (A) (by C-G formula  based on SCr of 0.63 mg/dL (L)).  Results from last 7 days   Lab Units 03/09/21  0535 03/08/21  0607 03/07/21  0550 03/06/21  0551   ALBUMIN g/dL 2.80* 2.70* 2.30* 2.30*   BILIRUBIN mg/dL 0.4 0.3 0.4 0.6   ALK PHOS U/L 74 62 64 68   AST (SGOT) U/L 37 33 32 58*   ALT (SGPT) U/L 72* 56* 55* 65*     Results from last 7 days   Lab Units 03/09/21  0535 03/08/21  0607 03/07/21  0550 03/06/21  0551   CALCIUM mg/dL 8.7 7.9* 8.0* 8.3*   ALBUMIN g/dL 2.80* 2.70* 2.30* 2.30*   MAGNESIUM mg/dL 1.7 1.6 1.7 1.8     Results from last 7 days   Lab Units 03/05/21  0659 03/04/21  0807 03/04/21  0332 03/04/21  0215   PROCALCITONIN ng/mL 0.10  --   --  0.17   LACTATE mmol/L  --  1.5 2.1*  --        Coag     HbA1C   Lab Results   Component Value Date    HGBA1C 6.14 (H) 03/05/2021     Infection   Results from last 7 days   Lab Units 03/05/21  0659 03/04/21  0344 03/04/21  0333 03/04/21  0215   BLOODCX   --  No growth at 24 hours No growth at 5 days  --    PROCALCITONIN ng/mL 0.10  --   --  0.17     Radiology(recent) CT Head Without Contrast    Result Date: 3/7/2021  Electronically signed by Enriqueta Dickinson M.D. on 03-07-21 at 1832    No results found for: TROPONINT, TROPONINI, BNP  No components found for: TSH;2    allopurinol, 200 mg, Oral, Daily  amoxicillin-clavulanate, 1 tablet, Oral, Q12H  aspirin, 81 mg, Oral, Daily  atorvastatin, 20 mg, Oral, Nightly  budesonide-formoterol, 2 puff, Inhalation, BID - RT  buPROPion SR, 450 mg, Oral, Daily  busPIRone, 15 mg, Oral, TID  clopidogrel, 75 mg, Oral, Daily  divalproex, 250 mg, Oral, BID  enoxaparin, 40 mg, Subcutaneous, Q12H  hydrocortisone-bacitracin-zinc oxide-nystatin, 1 application, Topical, BID  ipratropium-albuterol, 3 mL, Nebulization, 4x Daily - RT  levothyroxine, 25 mcg, Oral, Q AM  lisinopril, 2.5 mg, Oral, Daily  metoprolol tartrate, 50 mg, Oral, BID  montelukast, 10 mg, Oral, Nightly  nystatin, , Topical, Q12H  pantoprazole, 40 mg, Oral, Daily  predniSONE, 40 mg, Oral, Daily With  Breakfast  vitamin D, 50,000 Units, Oral, Q7 Days       Diet Regular; Cardiac, Consistent Carbohydrate      Assessment/Plan      Active Hospital Problems    Diagnosis  POA   • **Pneumonia due to infectious organism [J18.9]  Yes   • Orthostatic hypotension [I95.1]  Yes   • Hypokalemia [E87.6]  No   • Vitamin D deficiency [E55.9]  Yes   • MITZI (obstructive sleep apnea) [G47.33]  Yes   • Severe depression (CMS/[facility]) [F32.2]  Yes   • HLD (hyperlipidemia) [E78.5]  Yes   • HTN (hypertension) [I10]  Yes   • History of stroke [Z86.73]  Not Applicable   • GERD without esophagitis [K21.9]  Yes   • Diastolic CHF, chronic (CMS/[facility]) [I50.32]  Yes   • COPD (chronic obstructive pulmonary disease) (CMS/[facility]) [J44.9]  Yes   • Dementia (CMS/[facility]) [F03.90]  Yes   • Lactic acidosis [E87.2]  Yes   • Leukocytosis [D72.829]  Yes   • Elevated d-dimer [R79.89]  Yes   • Type 2 diabetes mellitus with diabetic neuropathy, unspecified (CMS/[facility]) [E11.40]  Yes   • Acute respiratory failure with hypoxia (CMS/[facility]) [J96.01]  Yes      Resolved Hospital Problems   No resolved problems to display.       66yo gentleman sent from NH with SOA and hypoxia. Suffers from dementia and likely some metabolic encephalopathy on top of that due to infection. Admitted here with concern for recurrent PNA. Was just admitted to another facility for PNA with sepsis.      -Has been treated for bacterial pneumonia with IV Zosyn.  Will change to p.o. Augmentin.  There was also possibility that this may be organizing pneumonia and is on prednisone p.o. per pulmonary with plans to continue at current dose x1 month and then have a gradual taper by outpatient pulmonologist.     Lightheaded when upright, quite orthostatic - recomend Job[employer] stockings     SLP eval very reassuring (now that he is more awake/alert), regular diet in place with swallow precautions, restarted home meds, though lower dose of benzo and depakote     DDimer elevated, no PE on CTA, venous u/s of legs neg for  DVT     Stool loose but not frequent, abd exam benign, WBC not impressive, CDiff negative, continue to monitor as he is at risk for CDiff given amount of abx he's received recently     A1c only 6.14, continue to monitor accuchecks, sugars okay again today despite steroids     Vitamin D very low, started po replacement     Has h/o CVA with mild residual non-dominant left hemiparesis, at baseline        · Lovenox 40mg BID for DVT prophylaxis.  · Discussed with patient and nursing staff.  · Anticipate discharge back to SNU facility, discussed with POA that I would recommend going to SNF to get stronger while making then plans for moving patient to [place]       DW staff  DW POA    Greater than 36 minutes spent with greater than 50% counseling and coordinating care        Hair Hyman MD  [facility]  03/09/21  15:30 EST

## 2021-03-09 NOTE — NURSING NOTE
Patient calming down a little bit after giving xanax. Still attempting to help him understand the importance of monitoring his oxygen and heart. He is adamant and verbally threatening about refusing monitoring and IV placement. Will continue to attempt and monitor. Door open and routine visual checks by RN.

## 2021-03-10 LAB
BACTERIA SPEC AEROBE CULT: NORMAL
GLUCOSE BLDC GLUCOMTR-MCNC: 108 MG/DL (ref 70–130)
GLUCOSE BLDC GLUCOMTR-MCNC: 125 MG/DL (ref 70–130)
GLUCOSE BLDC GLUCOMTR-MCNC: 210 MG/DL (ref 70–130)
GLUCOSE BLDC GLUCOMTR-MCNC: 83 MG/DL (ref 70–130)

## 2021-03-10 PROCEDURE — U0004 COV-19 TEST NON-CDC HGH THRU: HCPCS | Performed by: INTERNAL MEDICINE

## 2021-03-10 PROCEDURE — 94799 UNLISTED PULMONARY SVC/PX: CPT

## 2021-03-10 PROCEDURE — 82962 GLUCOSE BLOOD TEST: CPT

## 2021-03-10 PROCEDURE — 25010000002 ENOXAPARIN PER 10 MG: Performed by: HOSPITALIST

## 2021-03-10 PROCEDURE — U0005 INFEC AGEN DETEC AMPLI PROBE: HCPCS | Performed by: INTERNAL MEDICINE

## 2021-03-10 PROCEDURE — 97535 SELF CARE MNGMENT TRAINING: CPT

## 2021-03-10 PROCEDURE — 63710000001 PREDNISONE PER 1 MG: Performed by: INTERNAL MEDICINE

## 2021-03-10 RX ORDER — PREDNISONE 20 MG/1
40 TABLET ORAL
Start: 2021-03-11

## 2021-03-10 RX ORDER — ALPRAZOLAM 0.5 MG/1
0.5 TABLET ORAL 2 TIMES DAILY PRN
Qty: 10 TABLET | Refills: 0 | Status: SHIPPED | OUTPATIENT
Start: 2021-03-10

## 2021-03-10 RX ORDER — ERGOCALCIFEROL 1.25 MG/1
50000 CAPSULE ORAL
Qty: 5 CAPSULE
Start: 2021-03-12

## 2021-03-10 RX ADMIN — Medication 1 APPLICATION: at 08:04

## 2021-03-10 RX ADMIN — BUSPIRONE HYDROCHLORIDE 15 MG: 15 TABLET ORAL at 08:03

## 2021-03-10 RX ADMIN — BUSPIRONE HYDROCHLORIDE 15 MG: 15 TABLET ORAL at 20:59

## 2021-03-10 RX ADMIN — METOPROLOL TARTRATE 50 MG: 50 TABLET, FILM COATED ORAL at 08:03

## 2021-03-10 RX ADMIN — ENOXAPARIN SODIUM 40 MG: 40 INJECTION SUBCUTANEOUS at 20:58

## 2021-03-10 RX ADMIN — SODIUM CHLORIDE, PRESERVATIVE FREE 10 ML: 5 INJECTION INTRAVENOUS at 20:59

## 2021-03-10 RX ADMIN — IPRATROPIUM BROMIDE AND ALBUTEROL SULFATE 3 ML: 2.5; .5 SOLUTION RESPIRATORY (INHALATION) at 12:45

## 2021-03-10 RX ADMIN — ASPIRIN 81 MG: 81 TABLET, COATED ORAL at 08:03

## 2021-03-10 RX ADMIN — LEVOTHYROXINE SODIUM 25 MCG: 0.03 TABLET ORAL at 06:38

## 2021-03-10 RX ADMIN — ENOXAPARIN SODIUM 40 MG: 40 INJECTION SUBCUTANEOUS at 08:03

## 2021-03-10 RX ADMIN — PANTOPRAZOLE SODIUM 40 MG: 40 TABLET, DELAYED RELEASE ORAL at 08:04

## 2021-03-10 RX ADMIN — ALLOPURINOL 200 MG: 100 TABLET ORAL at 08:03

## 2021-03-10 RX ADMIN — BUDESONIDE AND FORMOTEROL FUMARATE DIHYDRATE 2 PUFF: 80; 4.5 AEROSOL RESPIRATORY (INHALATION) at 21:10

## 2021-03-10 RX ADMIN — BUPROPION HYDROCHLORIDE 450 MG: 150 TABLET, EXTENDED RELEASE ORAL at 08:03

## 2021-03-10 RX ADMIN — BUSPIRONE HYDROCHLORIDE 15 MG: 15 TABLET ORAL at 15:00

## 2021-03-10 RX ADMIN — DIVALPROEX SODIUM 250 MG: 250 TABLET, DELAYED RELEASE ORAL at 20:59

## 2021-03-10 RX ADMIN — BUDESONIDE AND FORMOTEROL FUMARATE DIHYDRATE 2 PUFF: 80; 4.5 AEROSOL RESPIRATORY (INHALATION) at 07:59

## 2021-03-10 RX ADMIN — DIVALPROEX SODIUM 250 MG: 250 TABLET, DELAYED RELEASE ORAL at 08:04

## 2021-03-10 RX ADMIN — IPRATROPIUM BROMIDE AND ALBUTEROL SULFATE 3 ML: 2.5; .5 SOLUTION RESPIRATORY (INHALATION) at 17:10

## 2021-03-10 RX ADMIN — NYSTATIN: 100000 POWDER TOPICAL at 08:04

## 2021-03-10 RX ADMIN — PREDNISONE 40 MG: 20 TABLET ORAL at 08:03

## 2021-03-10 RX ADMIN — ALPRAZOLAM 0.5 MG: 0.5 TABLET ORAL at 16:00

## 2021-03-10 RX ADMIN — ACETAMINOPHEN 650 MG: 325 TABLET ORAL at 15:00

## 2021-03-10 RX ADMIN — AMOXICILLIN AND CLAVULANATE POTASSIUM 1 TABLET: 875; 125 TABLET, FILM COATED ORAL at 08:03

## 2021-03-10 RX ADMIN — NYSTATIN: 100000 POWDER TOPICAL at 21:03

## 2021-03-10 RX ADMIN — ATORVASTATIN CALCIUM 20 MG: 20 TABLET, FILM COATED ORAL at 20:58

## 2021-03-10 RX ADMIN — MONTELUKAST SODIUM 10 MG: 10 TABLET, FILM COATED ORAL at 20:59

## 2021-03-10 RX ADMIN — METOPROLOL TARTRATE 50 MG: 50 TABLET, FILM COATED ORAL at 20:58

## 2021-03-10 RX ADMIN — Medication 1 APPLICATION: at 21:03

## 2021-03-10 RX ADMIN — AMOXICILLIN AND CLAVULANATE POTASSIUM 1 TABLET: 875; 125 TABLET, FILM COATED ORAL at 20:59

## 2021-03-10 RX ADMIN — CLOPIDOGREL 75 MG: 75 TABLET, FILM COATED ORAL at 08:03

## 2021-03-10 RX ADMIN — LISINOPRIL 2.5 MG: 2.5 TABLET ORAL at 08:03

## 2021-03-10 NOTE — DISCHARGE SUMMARY
NAME: Garcia Garcia ADMIT: 3/4/2021   : 1955  PCP: Vargas Ling MD    MRN: [id number] LOS: 6 days   AGE/SEX: 65 y.o. male  ROOM: [facility]     Date of Admission:  3/4/2021  Date of Discharge:  3/10/2021    PCP: Vargas Ling MD    CHIEF COMPLAINT  Back Pain      DISCHARGE DIAGNOSIS  Active Hospital Problems    Diagnosis  POA   • **Pneumonia due to infectious organism [J18.9]  Yes   • Orthostatic hypotension [I95.1]  Yes   • Hypokalemia [E87.6]  No   • Vitamin D deficiency [E55.9]  Yes   • MITZI (obstructive sleep apnea) [G47.33]  Yes   • Severe depression (CMS/[facility]) [F32.2]  Yes   • HLD (hyperlipidemia) [E78.5]  Yes   • HTN (hypertension) [I10]  Yes   • History of stroke [Z86.73]  Not Applicable   • GERD without esophagitis [K21.9]  Yes   • Diastolic CHF, chronic (CMS/[facility]) [I50.32]  Yes   • COPD (chronic obstructive pulmonary disease) (CMS/[facility]) [J44.9]  Yes   • Dementia (CMS/[facility]) [F03.90]  Yes   • Lactic acidosis [E87.2]  Yes   • Leukocytosis [D72.829]  Yes   • Elevated d-dimer [R79.89]  Yes   • Type 2 diabetes mellitus with diabetic neuropathy, unspecified (CMS/[facility]) [E11.40]  Yes   • Acute respiratory failure with hypoxia (CMS/[facility]) [J96.01]  Yes      Resolved Hospital Problems   No resolved problems to display.       SECONDARY DIAGNOSES  Past Medical History:   Diagnosis Date   • Acidosis    • Arthritis    • Asthma    • Atherosclerotic heart disease of native coronary artery without angina pectoris    • CHF (congestive heart failure) (CMS/[facility])    • Chronic obstructive pulmonary disease, unspecified (CMS/[facility])    • Cognitive communication deficit    • Cyst of kidney, acquired    • Elevated white blood cell count, unspecified    • Essential (primary) hypertension    • GERD (gastroesophageal reflux disease)    • Hemiplegia and hemiparesis following cerebral infarction affecting left non-dominant side (CMS/[facility])    • Hyperlipidemia, unspecified    • Major depressive disorder, recurrent,  severe with psychotic symptoms (CMS/[facility])    • Muscle weakness (generalized)    • Other specified metabolic disorders (CMS/HCC)    • Secondary polycythemia    • Sepsis due to Streptococcus pneumoniae (CMS/HCC)    • Sleep apnea, unspecified    • Tachycardia, unspecified    • Toxic encephalopathy    • Type 2 diabetes mellitus with diabetic neuropathy, unspecified (CMS/HCC)    • Type 2 diabetes mellitus with hypoglycemia without coma (CMS/HCC)    • Unspecified dementia with behavioral disturbance (CMS/[facility])        CONSULTS   Pulmonary    HOSPITAL COURSE  Patient is a 65-year-old obese white male with a past medical history significant for COPD, CHF, asthma, CVA with left hemiparesis, MITZI, depression and baseline cognitive defects/dementia who was transferred from nursing home due to severe hypoxemia and was admitted to the hospital for severe respiratory failure.  Chest x-ray showed evidence of bilateral infiltrates and COVID-19 was tested and negative.  He actually had recent admission to another hospital for pneumonia as well.    He was admitted and treated for pneumonia as well as respiratory failure.  He was given IV antibiotics.  Pulmonary had seen the patient and thought that this may be an organizing pneumonia as well so had placed him on prednisone 40 mg daily and recommend that he continue this x1 month at least with then gradual taper after this.  [facility] with them or can see Dr. Vivas.    He also has significant orthostasis.  Have ordered Jobst stockings which she should continue but likely has an element of chronic orthostasis.  He has been working with physical therapy but still is quite weak and it is recommended for him to go to subacute rehab.  His family does wish to move him eventually to [place] to be closer with family, but I feel that he would benefit from getting stronger before attempting a cross-country flight as well as would likely  need to make arrangements in [place] prior to flying out there.    DIAGNOSTICS    FL Video Swallow With Speech Single Contrast [[id number]] Aldair as Reviewed   Order Status: Completed Collected: 03/10/21 0943    Updated: 03/10/21 0959   Narrative:     VIDEO SWALLOWING EXAM       HISTORY: Dysphagia.       FINDINGS: There is mild oropharyngeal dysphagia with episodes of   penetration witnessed. No sukumar aspiration is demonstrated.   Mild-to-moderate vallecular residuals are demonstrated. Esophageal scan   demonstrates delay in esophageal emptying without clear etiology. The   gastroesophageal junction is not well visualized on this exam due to   overlapping densities. No esophageal dilatation is evident. Exam will be   reported with recommendations to be made separately by the speech   pathologist (see report).       FLUOROSCOPY TIME: 1 minute 53 seconds, 3057 images.       This report was finalized on 3/10/2021 9:56 AM by Dr. Frank Coyne M.D.       XR Chest 2 View [[id number]] Aldair as Reviewed   Order Status: Completed Collected: 03/08/21 1611    Updated: 03/08/21 1616   Narrative:     XR CHEST 2 VW-       Clinical: Follow-up infiltrate       COMPARISON 3/4/2021       FINDINGS: Bilateral infiltrates demonstrated on the previous examination   was pronounced currently. Mild to moderate residual. Cardiomediastinal   silhouette is stable. No new area of consolidation seen. No pulmonary   edema or gross pleural effusion seen.       CONCLUSION: Improved infiltrates since 3/4/2021.       This report was finalized on 3/8/2021 4:13 PM by Dr. Francisco Schrader M.D.       CT Head Without Contrast [[id number]] Aldair as Reviewed   Order Status: Completed Collected: 03/07/21 1833    Updated: 03/07/21 1833   Narrative:       Patient: ELMER ROSALES  Time Out: 18:32   Exam(s): CT HEAD Without Contrast     EXAM:     CT Head Without Intravenous Contrast     CLINICAL HISTORY:      Reason for exam: Head trauma, minor (Age >= 65y). fall.      TECHNIQUE:     Axial computed tomography images of the head brain without intravenous   contrast.  CTDI is 54.8 mGy and DLP is 1056.6 mGy-cm.  This CT exam was   performed according to the principle of ALARA (As Low As Reasonably   Achievable) by using one or more of the following dose reduction   techniques: automated exposure control, adjustment of the mA and or kV   according to patient size, and or use of iterative reconstruction   technique.     COMPARISON:     None     FINDINGS:     Brain: No acute infarct or hemorrhage identified. No extra-axial fluid   collection. No mass effect or midline shift. Scattered areas of   hypoattenuation in the supratentorial white matter likely represent   chronic small vessel ischemic changes.       Ventricles and sulci: Prominence of the ventricles and sulci is likely   secondary to cerebral volume loss.       Bones:  Normal. No bony lesion or acute fracture.       Subcutaneous tissues: Normal.       Sinuses: Normal. No air-fluid levels or mucosal thickening.       Mastoid air cells: Normal.       Orbits: Grossly unremarkable.       Other: Atherosclerotic calcifications in the intracranial vasculature.     IMPRESSION:     1.  No acute intracranial abnormality.   2.  Chronic small vessel ischemic changes and cerebral volume loss.    Impression:         Electronically signed by Enriqueta Dickinson M.D. on 03-07-21 at 1832   Duplex Venous Lower Extremity - Bilateral CAR [[id number]] Aldair as Reviewed   Order Status: Completed Resulted: 03/04/21 1855    Updated: 03/04/21 1856    Right Common Femoral Spont Y    Right Common Femoral Phasic Y    Right Common Femoral Augment Y    Right Common Femoral Competent Y    Right Common Femoral Compress C    Right Saphenofemoral Junction Compress C    Right Profunda Femoral Compress C    Right Proximal Femoral Compress C    Right Mid Femoral Spont Y    Right Mid Femoral Phasic Y    Right Mid Femoral Augment Y    Right Mid Femoral Competent Y     Right Mid Femoral Compress C    Right Distal Femoral Compress C    Right Popliteal Spont Y    Right Popliteal Phasic Y    Right Popliteal Augment Y    Right Popliteal Competent Y    Right Popliteal Compress C    Right Posterior Tibial Compress C    Right Peroneal Compress C    Right GastronemiusSoleal Compress C    Right Greater Saph AK Compress C    Right Greater Saph BK Compress C    Right Lesser Saph Compress C    Left Common Femoral Spont Y    Left Common Femoral Phasic Y    Left Common Femoral Augment Y    Left Common Femoral Competent Y    Left Common Femoral Compress C    Left Saphenofemoral Junction Compress C    Left Profunda Femoral Compress C    Left Proximal Femoral Compress C    Left Mid Femoral Spont Y    Left Mid Femoral Phasic Y    Left Mid Femoral Augment Y    Left Mid Femoral Competent Y    Left Mid Femoral Compress C    Left Distal Femoral Compress C    Left Popliteal Spont Y    Left Popliteal Phasic Y    Left Popliteal Augment Y    Left Popliteal Competent Y    Left Popliteal Compress C    Left Posterior Tibial Compress C    Left Peroneal Compress C    Left GastronemiusSoleal Compress C    Left Greater Saph AK Compress C    Left Greater Saph BK Compress C    Left Lesser Saph Compress C   Narrative:     · Normal bilateral lower extremity venous duplex scan.       XR Chest 1 View [[id number]] Aldair as Reviewed   Order Status: Completed Collected: 03/04/21 1500    Updated: 03/04/21 1507   Narrative:     ONE VIEW PORTABLE CHEST AT 2:31 PM       HISTORY: Shortness of breath. Hypoxia.       FINDINGS: The lungs are moderately expanded with prominent haziness   throughout the right lung and at the left base showing marked worsening   from 12 hours ago and most consistent with areas of pneumonia. The   patient had a CT scan performed 11 hours ago showing groundglass opacity   throughout the right lung and scattered in the left lung and raising the   concern of Covid 19 pneumonia. Clinical correlation is  therefore   recommended. The heart remains borderline enlarged.       This report was finalized on 3/4/2021 3:04 PM by Dr. Josesito Goldberg M.D.       CT Angiogram Chest [[id number]] [place]air as Reviewed   Order Status: Completed Collected: 03/04/21 0510    Updated: 03/04/21 0510   Narrative:       Patient: ELMER ROSALES  Time Out: 05:09   Exam(s): CTA CHEST     EXAM:     CT Angiography Chest With Intravenous Contrast     CLINICAL HISTORY:      Reason for exam: r o PE.     TECHNIQUE:     Axial computed tomographic angiography images of the chest with   intravenous contrast.  CTDI is 14.80 mGy and DLP is 613.80 mGy-cm.  This   CT exam was performed according to the principle of ALARA (As Low As   Reasonably Achievable) by using one or more of the following dose   reduction techniques: automated exposure control, adjustment of the mA   and or kV according to patient size, and or use of iterative   reconstruction technique.     MIP reconstructed images were created and reviewed.     COMPARISON:     No relevant prior studies available.     FINDINGS:     Pulmonary arteries:  Unremarkable.  No pulmonary embolism.     Aorta:  No acute findings.  No thoracic aortic aneurysm.     Lungs: Calcified granulomas with calcified subcarinal right hilar lymph   nodes compatible with prior granulomatous disease.  No mass.  Ground-   glass opacification of the right lung left apex with scattered ground-   glass opacification of the superior aspect of the left lower lobe..     Pleural space:  Unremarkable.  No significant effusion.  No   pneumothorax.     Heart:  Unremarkable.  No cardiomegaly.  No significant pericardial   effusion.  No evidence of RV dysfunction.     Bones joints:  No acute fracture.  No dislocation.     Soft tissues:  Unremarkable.     Lymph nodes:  Unremarkable.  No enlarged lymph nodes.     IMPRESSION:       1. No acute pulmonary embolism.     2. Diffuse ground-glass opacification of the right lung with patchy areas    of glass opacification of the superior left upper lobe and superior   aspect of the left lower lobe which may be due to infection, inhalational   injury, or drug-induced pneumonitis.      03/09/2021 0536 03/09/2021 0626 CBC (No Diff) [[id number]]   (Abnormal)   Blood    Final result Component Value Units   WBC 12.65High  10*3/mm3   RBC 5.66 10*6/mm3   Hemoglobin 16.0 g/dL   Hematocrit 46.7 %   MCV 82.5 fL   MCH 28.3 pg   MCHC 34.3 g/dL   RDW 15.5High  %   RDW-SD 45.1 fl   MPV 9.2 fL   Platelets 283 10*3/mm3           03/09/2021 0535 03/09/2021 0647 Comprehensive Metabolic Panel [[id number]]    (Abnormal)   Blood    Final result Component Value Units   Glucose 92 mg/dL   BUN 10 mg/dL   Creatinine 0.63Low  mg/dL   Sodium 137 mmol/L   Potassium 3.6 mmol/L   Chloride 99 mmol/L   CO2 25.5 mmol/L   Calcium 8.7 mg/dL   Total Protein 6.6 g/dL   Albumin 2.80Low  g/dL   ALT (SGPT) 72High  U/L   AST (SGOT) 37 U/L   Alkaline Phosphatase 74 U/L   Total Bilirubin 0.4 mg/dL   eGFR Non African Am 129 mL/min/1.73   Globulin 3.8 gm/dL   A/G Ratio 0.7 g/dL   BUN/Creatinine Ratio 15.9    Anion Gap 12.5 mmol/L        03/05/2021 0659 03/05/2021 0817 Hemoglobin A1c [[id number]]    (Abnormal)   Blood    Final result Component Value Units   Hemoglobin A1C 6.14High  %           03/05/2021 0659 03/05/2021 0844 Vitamin D 25 Hydroxy [[id number]]    (Abnormal)   Blood    Final result Component Value Units   25 Hydroxy, Vitamin D 12.4Low                PHYSICAL EXAM  Objective    (3/11)    Alert  nad  No resp distress  Chronically ill  Poor memory    CONDITION ON DISCHARGE  Stable.      DISCHARGE DISPOSITION     SNF    DISCHARGE MEDICATIONS       Your medication list      START taking these medications      Instructions Last Dose Given Next Dose Due   hydrocortisone-bacitracin-zinc oxide-nystatin  Commonly known as: MAGIC BARRIER      Apply 1 application topically to the appropriate area as directed 2 (Two) Times a Day.       vitamin D 1.25 MG  (66048 UT) capsule capsule  Commonly known as: ERGOCALCIFEROL  Start taking on: March 12, 2021      Take 1 capsule by mouth Every 7 (Seven) Days.          CHANGE how you take these medications      Instructions Last Dose Given Next Dose Due   ALPRAZolam 0.5 MG tablet  Commonly known as: XANAX  What changed: when to take this      Take 1 tablet by mouth 2 (Two) Times a Day As Needed for Anxiety.       divalproex 250 MG DR tablet  Commonly known as: DEPAKOTE  What changed: Another medication with the same name was removed. Continue taking this medication, and follow the directions you see here.      Take 250 mg by mouth 2 (Two) Times a Day.       predniSONE 20 MG tablet  Commonly known as: DELTASONE  Start taking on: March 11, 2021  What changed:   · when to take this  · additional instructions      Take 2 tablets by mouth Daily With Breakfast. X 1 month, gradual taper as directed by Pulmologist after that          CONTINUE taking these medications      Instructions Last Dose Given Next Dose Due   allopurinol 100 MG tablet  Commonly known as: ZYLOPRIM      Take 200 mg by mouth Daily.       aspirin 81 MG EC tablet      Take 81 mg by mouth Daily.       atorvastatin 20 MG tablet  Commonly known as: LIPITOR      Take 20 mg by mouth Every Night.       budesonide-formoterol 80-4.5 MCG/ACT inhaler  Commonly known as: SYMBICORT      Inhale 2 puffs 2 (Two) Times a Day.       buPROPion  MG 12 hr tablet  Commonly known as: WELLBUTRIN SR      Take 450 mg by mouth Daily.       busPIRone 15 MG tablet  Commonly known as: BUSPAR      Take 15 mg by mouth 3 (Three) Times a Day.       clopidogrel 75 MG tablet  Commonly known as: PLAVIX      Take 75 mg by mouth Daily.       levothyroxine 25 MCG tablet  Commonly known as: SYNTHROID, LEVOTHROID      Take 25 mcg by mouth Daily.       lisinopril 2.5 MG tablet  Commonly known as: PRINIVIL,ZESTRIL      Take 2.5 mg by mouth Daily.       metoprolol tartrate 50 MG tablet  Commonly known as:  LOPRESSOR      Take 50 mg by mouth 2 (Two) Times a Day.       montelukast 10 MG tablet  Commonly known as: SINGULAIR      Take 10 mg by mouth Every Night.       Nystatin powder      1 application Daily.       pantoprazole 40 MG EC tablet  Commonly known as: PROTONIX      Take 40 mg by mouth Daily.       Proventil  (90 Base) MCG/ACT inhaler  Generic drug: albuterol sulfate HFA      Inhale 2 puffs Every 4 (Four) Hours As Needed for Wheezing.          STOP taking these medications    ciprofloxacin 0.3 % ophthalmic solution  Commonly known as: CILOXAN              Where to Get Your Medications      You can get these medications from any pharmacy    Bring a paper prescription for each of these medications  · ALPRAZolam 0.5 MG tablet     Information about where to get these medications is not yet available    Ask your nurse or doctor about these medications  · hydrocortisone-bacitracin-zinc oxide-nystatin   · predniSONE 20 MG tablet  · vitamin D 1.25 MG (68095 UT) capsule capsule        No future appointments.  Follow-up Information     Danial Vivas MD. Schedule an appointment as soon as possible for a visit in 1 month(s).    Specialty: Pulmonary Disease  Contact information:  [address] [id number] [phone]             Vargas Ling MD .    Specialty: Family Medicine  Contact information:  [address]  [facility] [id number] [phone]                   TEST  RESULTS PENDING AT DISCHARGE         Hair Hyman MD  [facility]  03/10/21  15:12 EST      Time: greater than 32 minutes on discharge  It was a pleasure taking care of this patient while in the hospital.

## 2021-03-10 NOTE — THERAPY TREATMENT NOTE
Patient Name: Garcia Garcia  : 1955    MRN: [id number]                              Today's Date: 3/10/2021       Admit Date: 3/4/2021    Visit Dx:     ICD-10-CM ICD-9-CM   1. Pneumonia due to infectious organism, unspecified laterality, unspecified part of lung  J18.9 486   2. Hypoxia  R09.02 799.02   3. Severe depression (CMS/[facility])  F32.2 311     Patient Active Problem List   Diagnosis   • Pneumonia due to infectious organism   • MITZI (obstructive sleep apnea)   • Severe depression (CMS/[facility])   • HLD (hyperlipidemia)   • HTN (hypertension)   • History of stroke   • GERD without esophagitis   • Diastolic CHF, chronic (CMS/[facility])   • COPD (chronic obstructive pulmonary disease) (CMS/[facility])   • Dementia (CMS/[facility])   • Lactic acidosis   • Leukocytosis   • Elevated d-dimer   • Type 2 diabetes mellitus with diabetic neuropathy, unspecified (CMS/[facility])   • Acute respiratory failure with hypoxia (CMS/[facility])   • Vitamin D deficiency   • Hypokalemia   • Orthostatic hypotension     Past Medical History:   Diagnosis Date   • Acidosis    • Arthritis    • Asthma    • Atherosclerotic heart disease of native coronary artery without angina pectoris    • CHF (congestive heart failure) (CMS/[facility])    • Chronic obstructive pulmonary disease, unspecified (CMS/[facility])    • Cognitive communication deficit    • Cyst of kidney, acquired    • Elevated white blood cell count, unspecified    • Essential (primary) hypertension    • GERD (gastroesophageal reflux disease)    • Hemiplegia and hemiparesis following cerebral infarction affecting left non-dominant side (CMS/[facility])    • Hyperlipidemia, unspecified    • Major depressive disorder, recurrent, severe with psychotic symptoms (CMS/[facility])    • Muscle weakness (generalized)    • Other specified metabolic disorders (CMS/[facility])    • Secondary polycythemia    • Sepsis due to Streptococcus pneumoniae (CMS/[facility])    • Sleep apnea, unspecified    • Tachycardia, unspecified    • Toxic encephalopathy    • Type 2  diabetes mellitus with diabetic neuropathy, unspecified (CMS/[facility])    • Type 2 diabetes mellitus with hypoglycemia without coma (CMS/[facility])    • Unspecified dementia with behavioral disturbance (CMS/[facility])      Past Surgical History:   Procedure Laterality Date   • KNEE SURGERY       General Information     Row Name 03/10/21 1456          OT Time and Intention    Document Type  therapy note (daily note)  -BT     Mode of Treatment  individual therapy;occupational therapy  -BT     Row Name 03/10/21 1456          Cognition    Orientation Status (Cognition)  oriented x 3  -BT     Row Name 03/10/21 1456          Safety Issues, Functional Mobility    Impairments Affecting Function (Mobility)  endurance/activity tolerance;shortness of breath;strength;pain  -BT       User Key  (r) = Recorded By, (t) = Taken By, (c) = Cosigned By    Initials Name Provider Type    BT Karen Rodriguez OT Occupational Therapist          Mobility/ADL's     Row Name 03/10/21 1457          Bed Mobility    Comment (Bed Mobility)  Pt declined to get OOB on this date.  -BT     Row Name 03/10/21 1457          Activities of Daily Living    BADL Assessment/Intervention  grooming  -BT     Row Name 03/10/21 145          Grooming Assessment/Training    [place] Level (Grooming)  grooming skills;hair care, combing/brushing  -BT     Position (Grooming)  -- side lying in bed  -BT       User Key  (r) = Recorded By, (t) = Taken By, (c) = Cosigned By    Initials Name Provider Type    BT Karen Rodriguez OT Occupational Therapist        Obj/Interventions    No documentation.       Goals/Plan    No documentation.       Clinical Impression     Row Name 03/10/21 1459          Plan of Care Review    Plan of Care Reviewed With  patient  -BT     Progress  no change  -BT     Outcome Summary  Pt agreeable to participate in skilled OT as this was my 2nd attempt to work with him today. Pt sidelying upon arrival complaining of 7/10 pain to lower back. Pt completed grooming task  with set up while sidelying and did not want to perform any bed mobility or functional transfers due to pain. Pt educated on importance of repositioning and participation in therapy with OOB activity in order to maximize strength and activity tolerance with adls and functional transfers. Pt verbalized understanding stating he will do more next session. OT will continue to follow.  -BT       User Key  (r) = Recorded By, (t) = Taken By, (c) = Cosigned By    Initials Name Provider Type    BT Karen Rodriguez OT Occupational Therapist        Outcome Measures     Row Name 03/10/21 1501          How much help from another is currently needed...    Putting on and taking off regular lower body clothing?  2  -BT     Bathing (including washing, rinsing, and drying)  2  -BT     Toileting (which includes using toilet bed pan or urinal)  2  -BT     Putting on and taking off regular upper body clothing  3  -BT     Taking care of personal grooming (such as brushing teeth)  3  -BT     Eating meals  3  -BT     AM-PAC 6 Clicks Score (OT)  15  -BT     Row Name 03/10/21 1501          Functional Assessment    Outcome Measure Options  AM-PAC 6 Clicks Daily Activity (OT)  -BT       User Key  (r) = Recorded By, (t) = Taken By, (c) = Cosigned By    Initials Name Provider Type    BT Karen Rodriguez OT Occupational Therapist        Occupational Therapy Education                 Title: PT OT SLP Therapies (In Progress)     Topic: Occupational Therapy (In Progress)     Point: ADL training (In Progress)     Description:   Instruct learner(s) on proper safety adaptation and remediation techniques during self care or transfers.   Instruct in proper use of assistive devices.              Learning Progress Summary           Patient Acceptance, E, NR by YASMINE at 3/8/2021 1331    Comment: Reviewed safety and pacing during self care.    Acceptance, E, VU by BILL at 3/5/2021 1505    Comment: Educated pt on OT role, OT plan of care, safety techniques for ADLs  and functional transfers, as well as energy conservation techniques.   Family Acceptance, E, VU by BILL at 3/5/2021 1505    Comment: Educated pt on OT role, OT plan of care, safety techniques for ADLs and functional transfers, as well as energy conservation techniques.                   Point: Home exercise program (Not Started)     Description:   Instruct learner(s) on appropriate technique for monitoring, assisting and/or progressing therapeutic exercises/activities.              Learner Progress:  Not documented in this visit.          Point: Precautions (Done)     Description:   Instruct learner(s) on prescribed precautions during self-care and functional transfers.              Learning Progress Summary           Patient Acceptance, E, VU by BILL at 3/5/2021 1505    Comment: Educated pt on OT role, OT plan of care, safety techniques for ADLs and functional transfers, as well as energy conservation techniques.   Family Acceptance, E, VU by BILL at 3/5/2021 1505    Comment: Educated pt on OT role, OT plan of care, safety techniques for ADLs and functional transfers, as well as energy conservation techniques.                   Point: Body mechanics (Done)     Description:   Instruct learner(s) on proper positioning and spine alignment during self-care, functional mobility activities and/or exercises.              Learning Progress Summary           Patient Acceptance, E, VU by BILL at 3/5/2021 1505    Comment: Educated pt on OT role, OT plan of care, safety techniques for ADLs and functional transfers, as well as energy conservation techniques.   Family Acceptance, E, VU by BILL at 3/5/2021 1505    Comment: Educated pt on OT role, OT plan of care, safety techniques for ADLs and functional transfers, as well as energy conservation techniques.                               User Key     Initials Effective Dates Name Provider Type Discipline    CW 06/08/18 -  Chio Unger OTR Occupational Therapist OT    BILL 09/14/20 -  Lisa  Fior DOWNEY OT Occupational Therapist OT              OT Recommendation and Plan     Plan of Care Review  Plan of Care Reviewed With: patient  Progress: no change  Outcome Summary: Pt agreeable to participate in skilled OT as this was my 2nd attempt to work with him today. Pt sidelying upon arrival complaining of 7/10 pain to lower back. Pt completed grooming task with set up while sidelying and did not want to perform any bed mobility or functional transfers due to pain. Pt educated on importance of repositioning and participation in therapy with OOB activity in order to maximize strength and activity tolerance with adls and functional transfers. Pt verbalized understanding stating he will do more next session. OT will continue to follow.     Time Calculation:   Time Calculation- OT     Row Name 03/10/21 1503             Time Calculation- OT    OT Start Time  1355  -BT      OT Stop Time  1405  -BT      OT Time Calculation (min)  10 min  -BT      Total Timed Code Minutes- OT  10 minute(s)  -BT      OT Received On  03/10/21  -BT      OT - Next Appointment  03/11/21  -BT      OT Goal Re-Cert Due Date  03/22/21  -BT        User Key  (r) = Recorded By, (t) = Taken By, (c) = Cosigned By    Initials Name Provider Type    BT Karen Rodriguez, OT Occupational Therapist        Therapy Charges for Today     Code Description Service Date Service Provider Modifiers Qty    [id number]  OT SELF CARE/MGMT/TRAIN EA 15 MIN 3/10/2021 Karen Rodriguez OT GO 1               Karen Rodriguez OT  3/10/2021

## 2021-03-10 NOTE — PROGRESS NOTES
Continued Stay Note  [facility]     Patient Name: Garcia Garcia  MRN: [id number]  Today's Date: 3/10/2021    Admit Date: 3/4/2021    Discharge Plan     Row Name 03/10/21 0949       Plan    Plan Comments  Spoke with Lakshmi and she confirms that she received the SNF list and will review it.  Encouraged her to provide CCP with SNF choices as soon as possible as patient is ready for dc.  Advised that CCP will follow up in several hours. Philomena Jarrett RN    Row Name 03/10/21 0916       Plan    Plan  SNF- need facility choices from family    Patient/Family in Agreement with Plan  yes    Plan Comments  Spoke with daughter and she did not recieve the SNF list via e-mail yesterday.  Resent to [email or web address].  Advised that CCP will follow up this afternoon. Philomena Jarrett RN        Discharge Codes    No documentation.       Expected Discharge Date and Time     Expected Discharge Date Expected Discharge Time    Mar 8, 2021             Philomena Jarrett RN

## 2021-03-10 NOTE — PLAN OF CARE
Problem: Adult Inpatient Plan of Care  Goal: Plan of Care Review  Outcome: Ongoing, Progressing  Flowsheets (Taken 3/10/2021 0606)  Progress: improving  Plan of Care Reviewed With: patient  Outcome Summary: All needs met this shift. Incontinence care provided. Daily weight. VSS. Alert and oriented X 4. On room air. NSR on the monitor. No pain or complaints. Blood glucose monitoring. Will CTM.

## 2021-03-10 NOTE — DISCHARGE PLACEMENT REQUEST
"Elmer Garcia (65 y.o. Male)     Date of Birth Social Security Number Address Home Phone MRN    1955  [facility]  [address]  [facility] [address] [phone] [id number]    [facility] Marital Status          None        Admission Date Admission Type Admitting Provider Attending Provider Department, Room/Bed    3/4/21 Emergency Malcolm Hillman MD Jackson, Alan David, MD [address], [address]    Discharge Date Discharge Disposition Discharge Destination                       Attending Provider: Hair Hyman MD    Allergies: No Known Allergies    Isolation: None   Infection: None   Code Status: CPR    Ht: 170.2 cm (67.01\")   Wt: 125 kg (275 lb 5.7 oz)    Admission Cmt: None   Principal Problem: Pneumonia due to infectious organism [J18.9]                 Active Insurance as of 3/4/2021     Primary Coverage     Payor Plan Insurance Group Employer/Plan Group    MEDICARE MEDICARE A ONLY      Payor Plan Address Payor Plan Phone Number Payor Plan Fax Number Effective Dates    [address] [phone]  7/1/2004 - None Entered    [place] [address]       Subscriber Name Subscriber Birth Date Member ID       ELMER GARCIA 1955 [id number]                 Emergency Contacts      (Rel.) Home Phone Work Phone Mobile Phone    Lakshmi Moses (POA) (Power of ) [phone] -- [phone]    Adela Price (Neighbor) [phone] -- [phone]            "

## 2021-03-10 NOTE — PROGRESS NOTES
Continued Stay Note  [facility]     Patient Name: Garcia Garcia  MRN: [id number]  Today's Date: 3/10/2021    Admit Date: 3/4/2021    Discharge Plan     Row Name 03/10/21 1541       Plan    Plan  [facility] arragned for 3/11 @ 1030    Plan Comments  DC orders in EPIC. Spoke with Lakshmi and she would like the patient to go to [facility].  Spoke with [facility] and they can accept, but patient will need repeat COVID and 30 day.  COVID test ordered.  Unable to get transportation this evening.  [facility] booked for 3/11 @ 1030.  MD aware.  Packet in cubbie.  Patient and daughter in law updated. Philomena Jarrett RN        Discharge Codes    No documentation.       Expected Discharge Date and Time     Expected Discharge Date Expected Discharge Time    Mar 10, 2021             Philomena Jarrett RN

## 2021-03-10 NOTE — PLAN OF CARE
Patient doing well. Up OOB in chair twice for several hours today. Patient FSBS this evening was high at 210, patient had wendys brought in for lunch. Patient VSS this shift. He has plans to DC tomorrow (orders in) to SNF. Covid swab done at the request of the SNF. Sent and pending. Patient can be slightly noncompliant at times and needs motivation to complete daily care tasks. He is frustrated with his current situation and understands that he should be allowing us to care for him and should be completing his ADLS. Family spoke with the patient and he has been compliant most of the shift. No new complaints. Will continue to monitor.             Problem: Adult Inpatient Plan of Care  Goal: Optimal Comfort and Wellbeing  Outcome: Ongoing, Progressing  Intervention: Provide Person-Centered Care  Recent Flowsheet Documentation  Taken 3/10/2021 1455 by Ирина Ervin RN  Trust Relationship/Rapport:   care explained   choices provided  Taken 3/10/2021 0800 by Ирина Ervin RN  Trust Relationship/Rapport:   care explained   choices provided  Goal: Readiness for Transition of Care  Outcome: Ongoing, Progressing     Problem: Fall Injury Risk  Goal: Absence of Fall and Fall-Related Injury  Outcome: Ongoing, Progressing  Intervention: Identify and Manage Contributors to Fall Injury Risk  Recent Flowsheet Documentation  Taken 3/10/2021 1600 by Ирина Ervin RN  Medication Review/Management: medications reviewed  Taken 3/10/2021 1455 by Ирина Ervin RN  Medication Review/Management: medications reviewed  Taken 3/10/2021 1240 by Ирина Ervin RN  Medication Review/Management: medications reviewed  Taken 3/10/2021 1005 by Ирина Ervin RN  Medication Review/Management: medications reviewed  Taken 3/10/2021 0800 by Ирина Ervin RN  Medication Review/Management: medications reviewed  Intervention: Promote Injury-Free Environment  Recent Flowsheet Documentation  Taken 3/10/2021 1600 by Ирина Ervin  RN  Safety Promotion/Fall Prevention: safety round/check completed  Taken 3/10/2021 1455 by Ирина Ervin RN  Safety Promotion/Fall Prevention: safety round/check completed  Taken 3/10/2021 1240 by Ирина Ervin RN  Safety Promotion/Fall Prevention: safety round/check completed  Taken 3/10/2021 1005 by Ирина Ervin RN  Safety Promotion/Fall Prevention: safety round/check completed  Taken 3/10/2021 0800 by Ирина Ervin RN  Safety Promotion/Fall Prevention: (Simultaneous filing. User may be unaware of other data.) safety round/check completed   Goal Outcome Evaluation:     Progress: improving

## 2021-03-10 NOTE — PROGRESS NOTES
Continued Stay Note  [facility]     Patient Name: Garcia Garcia  MRN: [id number]  Today's Date: 3/10/2021    Admit Date: 3/4/2021    Discharge Plan     Row Name 03/10/21 0916       Plan    Plan  SNF- need facility choices from family    Patient/Family in Agreement with Plan  yes    Plan Comments  Spoke with daughter and she did not recieve the SNF list via e-mail yesterday.  Resent to [email or web address]  Advised that CCP will follow up this afternoon. Philomena Jarrett RN        Discharge Codes    No documentation.       Expected Discharge Date and Time     Expected Discharge Date Expected Discharge Time    Mar 8, 2021             Philomena Jarrett RN

## 2021-03-10 NOTE — PLAN OF CARE
Goal Outcome Evaluation:  Plan of Care Reviewed With: patient  Progress: no change  Outcome Summary: Pt agreeable to participate in skilled OT as this was my 2nd attempt to work with him today. Pt sidelying upon arrival complaining of 7/10 pain to lower back. Pt completed grooming task with set up while sidelying and did not want to perform any bed mobility or functional transfers due to pain. Pt educated on importance of repositioning and participation in therapy with OOB activity in order to maximize strength and activity tolerance with adls and functional transfers. Pt verbalized understanding stating he will do more next session. OT will continue to follow.    Pt did not wear face mask during this therapy encounter. Therapist wore appropriate PPE including face mask, gloves, and eye protection. Hand hygiene completed before and after this therapy session. Pt not in enhanced precautions.

## 2021-03-10 NOTE — PROGRESS NOTES
Adult Nutrition  Assessment/PES    Patient Name:  Garcia Garcia  YOB: 1955  MRN: [id number]  Admit Date:  3/4/2021    Assessment Date:  3/10/2021    Comments:  Follow up note. Pt on Regular Cardiac Consistent Carb diet and tolerating with good po intake 100% this am for breakfast. Will honor food pref's.    Reason for Assessment     Row Name 03/10/21 1244          Reason for Assessment    Reason For Assessment  follow-up protocol         Nutrition/Diet History     Row Name 03/10/21 1244          Nutrition/Diet History    Typical Food/Fluid Intake  tolerating po, 100% for breakfast this am         Anthropometrics     Row Name 03/10/21 0500          Anthropometrics    Weight  125 kg (275 lb 5.7 oz)         Labs/Tests/Procedures/Meds     Row Name 03/10/21 1244          Labs/Procedures/Meds    Lab Results Reviewed  reviewed     Lab Results Comments  wbc        Diagnostic Tests/Procedures    Diagnostic Test/Procedure Reviewed  reviewed        Medications    Pertinent Medications Reviewed  reviewed         Physical Findings     Row Name 03/10/21 1244          Physical Findings    Overall Physical Appearance  obese     Skin  edema;other (see comments) bruised, bilateral perirectal MASD, L mid axillary MASD, rash           Nutrition Prescription Ordered     Row Name 03/10/21 1245          Nutrition Prescription PO    Current PO Diet  Regular     Common Modifiers  Cardiac;Consistent Carbohydrate                 Problem/Interventions:  Problem 1     Row Name 03/10/21 1245          Nutrition Diagnoses Problem 1    Resolved?  Yes               Intervention Goal     Row Name 03/10/21 1246          Intervention Goal    General  Maintain nutrition     PO  Maintain intake     Weight  Appropriate weight loss         Nutrition Intervention     Row Name 03/10/21 1246          Nutrition Intervention    RD/Tech Action  Follow Tx progress;Care plan reviewd           Education/Evaluation     Row Name 03/10/21 1246           Monitor/Evaluation    Monitor  Per protocol           Electronically signed by:  Maya Ross RD  03/10/21 12:47 EST

## 2021-03-10 NOTE — PROGRESS NOTES
[facility]  [phone]  Rodger Polanco MD    Subjective:  LOS: 6    No new respiratory complaints.  He wants to go home.  Denies cough or wheezing.    Objective   Vital Signs past 24hrs    Temp range: Temp (24hrs), Av.8 °F (36.6 °C), Min:97.5 °F (36.4 °C), Max:98.3 °F (36.8 °C)    BP range: BP: (118-143)/(63-81) 118/69  Pulse range: Heart Rate:  [64-85] 85  Resp rate range: Resp:  [18-24] 18    Device (Oxygen Therapy): room air   Oxygen range:SpO2:  [91 %-97 %] 97 %      125 kg (275 lb 5.7 oz); Body mass index is 43.12 kg/m².    Intake/Output Summary (Last 24 hours) at 3/10/2021 1627  Last data filed at 3/10/2021 0500  Gross per 24 hour   Intake 390 ml   Output 700 ml   Net -310 ml       Physical Exam  Constitutional:       Appearance: He is obese.   HENT:      Mouth/Throat:      Mouth: Mucous membranes are moist.   Eyes:      Pupils: Pupils are equal, round, and reactive to light.   Cardiovascular:      Rate and Rhythm: Normal rate and regular rhythm.      Heart sounds: No murmur.   Pulmonary:      Effort: Pulmonary effort is normal.      Breath sounds: Decreased breath sounds present. No wheezing.   Abdominal:      General: Bowel sounds are normal.      Palpations: Abdomen is soft. There is no mass.      Tenderness: There is no abdominal tenderness.   Musculoskeletal:         General: No swelling.      Cervical back: Neck supple.   Skin:     General: Skin is warm.   Neurological:      Comments: Left hemiparesis      exam limited by morbid obesity  Results Review:    I have reviewed the laboratory and imaging data since the last note by [facility] physician.  My annotations are noted in assessment and plan.    Medication Review:  I have reviewed the current MAR.  My annotations are noted in assessment and plan.       Plan   PCCM Problems  Acute hypoxic respiratory failure; severe and worsening  Noninvasive ventilator use  Acute metabolic encephalopathy  Concern for aspiration pneumonia  Asthma/COPD  exacerbation  Active tobacco abuse  COVID-19 ruled out x2  Relevant Medical Diagnoses  CVA with left hemiparesis  MITZI,  Morbid obesity  Chronic benzodiazepine use  Baseline cognitive defect/dementia        Plan of Treatment  Tolerating room air.    I reviewed his CT chest and he has extensive groundglass infiltrates in both lungs.  However also limited by morbid obesity and interpreting the chest film.  So far all infectious markers are negative. CxR today shows minimal parenchymal infiltrates. Needs fu with pulmonary.    Consideration may be organizing pneumonia.  Currently on 40 mg prednisone and appears clinically better. Continue present dose for ONE MONTH then taper off. Needs fu with pulmonary.    Apparent exacerbation of COPD/asthma.  Presently not wheezing but limited exam due to morbid obesity.  On steroids as above.    His recent swallow eval does not confirm the diagnosis of dysphagia and he was permitted regular diet.    Consider bronchoscopy if fails to improve.  However will be a high risk procedure with his morbid obesity.    Okay discharge per us.  [facility]  If he would like follow-up with us, please make appointment with Dr. Vivas.    Rodger Polanco MD  03/10/21  16:27 EST    While in the room and during my examination of the patient I wore gloves, gown, mask, eye protection and followed enhanced droplet/contact isolation protocol and precautions.  I washed my hands before and after this patient encounter.    Part of this note may be an electronic transcription/translation of spoken language to printed text using the Dragon Dictation System.

## 2021-03-11 VITALS
SYSTOLIC BLOOD PRESSURE: 133 MMHG | DIASTOLIC BLOOD PRESSURE: 64 MMHG | WEIGHT: 275.13 LBS | RESPIRATION RATE: 18 BRPM | BODY MASS INDEX: 43.18 KG/M2 | OXYGEN SATURATION: 95 % | HEART RATE: 78 BPM | TEMPERATURE: 98.5 F | HEIGHT: 67 IN

## 2021-03-11 LAB
GLUCOSE BLDC GLUCOMTR-MCNC: 110 MG/DL (ref 70–130)
SARS-COV-2 ORF1AB RESP QL NAA+PROBE: NOT DETECTED

## 2021-03-11 PROCEDURE — 82962 GLUCOSE BLOOD TEST: CPT

## 2021-03-11 PROCEDURE — 94799 UNLISTED PULMONARY SVC/PX: CPT

## 2021-03-11 PROCEDURE — 25010000002 ENOXAPARIN PER 10 MG: Performed by: HOSPITALIST

## 2021-03-11 PROCEDURE — 63710000001 PREDNISONE PER 1 MG: Performed by: INTERNAL MEDICINE

## 2021-03-11 PROCEDURE — 94760 N-INVAS EAR/PLS OXIMETRY 1: CPT

## 2021-03-11 RX ADMIN — PREDNISONE 40 MG: 20 TABLET ORAL at 08:50

## 2021-03-11 RX ADMIN — PANTOPRAZOLE SODIUM 40 MG: 40 TABLET, DELAYED RELEASE ORAL at 08:50

## 2021-03-11 RX ADMIN — LISINOPRIL 2.5 MG: 2.5 TABLET ORAL at 08:50

## 2021-03-11 RX ADMIN — NYSTATIN: 100000 POWDER TOPICAL at 08:52

## 2021-03-11 RX ADMIN — ALLOPURINOL 200 MG: 100 TABLET ORAL at 08:50

## 2021-03-11 RX ADMIN — ASPIRIN 81 MG: 81 TABLET, COATED ORAL at 08:50

## 2021-03-11 RX ADMIN — LEVOTHYROXINE SODIUM 25 MCG: 0.03 TABLET ORAL at 06:46

## 2021-03-11 RX ADMIN — Medication 1 APPLICATION: at 08:52

## 2021-03-11 RX ADMIN — IPRATROPIUM BROMIDE AND ALBUTEROL SULFATE 3 ML: 2.5; .5 SOLUTION RESPIRATORY (INHALATION) at 11:23

## 2021-03-11 RX ADMIN — ENOXAPARIN SODIUM 40 MG: 40 INJECTION SUBCUTANEOUS at 08:49

## 2021-03-11 RX ADMIN — BUPROPION HYDROCHLORIDE 450 MG: 150 TABLET, EXTENDED RELEASE ORAL at 08:50

## 2021-03-11 RX ADMIN — BUSPIRONE HYDROCHLORIDE 15 MG: 15 TABLET ORAL at 08:50

## 2021-03-11 RX ADMIN — IPRATROPIUM BROMIDE AND ALBUTEROL SULFATE 3 ML: 2.5; .5 SOLUTION RESPIRATORY (INHALATION) at 07:07

## 2021-03-11 RX ADMIN — CLOPIDOGREL 75 MG: 75 TABLET, FILM COATED ORAL at 08:50

## 2021-03-11 RX ADMIN — METOPROLOL TARTRATE 50 MG: 50 TABLET, FILM COATED ORAL at 08:50

## 2021-03-11 RX ADMIN — DIVALPROEX SODIUM 250 MG: 250 TABLET, DELAYED RELEASE ORAL at 08:50

## 2021-03-11 RX ADMIN — BUDESONIDE AND FORMOTEROL FUMARATE DIHYDRATE 2 PUFF: 80; 4.5 AEROSOL RESPIRATORY (INHALATION) at 07:08

## 2021-03-11 NOTE — PLAN OF CARE
Problem: Adult Inpatient Plan of Care  Goal: Plan of Care Review  Outcome: Ongoing, Progressing  Flowsheets (Taken 3/11/2021 1201)  Progress: improving  Plan of Care Reviewed With: patient  Outcome Summary: All needs met this shift. Assist X 2. Regular cardiac CC diet. daily weight. VSS. Alert and oriented X 4. On room air and CPAP at night. NSR on the monitor. No complaints. Will CTM. COVID swab for placement negative. Plan to d/c to SNF today.

## 2021-03-11 NOTE — PROGRESS NOTES
Name: Garcia Garcia ADMIT: 3/4/2021   : 1955  PCP: Vargas Ling MD    MRN: [id number] LOS: 7 days   AGE/SEX: 65 y.o. male  ROOM: [facility]   Subjective   Chief Complaint   Patient presents with   • Back Pain     Dyspnea better  On ra  On steroids  Working with therapists  Feels fair    ROS  No f/c  No n/v  No cp/palp      Objective   Vital Signs  Temp:  [98.3 °F (36.8 °C)-98.5 °F (36.9 °C)] 98.5 °F (36.9 °C)  Heart Rate:  [68-85] 75  Resp:  [16-24] 18  BP: (118-137)/(64-76) 133/64  SpO2:  [93 %-97 %] 94 %  on   ;   Device (Oxygen Therapy): room air  Body mass index is 43.08 kg/m².    Chronically ill  Physical Exam  Constitutional:       Appearance: He is well-developed. He is obese.   HENT:      Head: Normocephalic and atraumatic.   Eyes:      General: No scleral icterus.  Cardiovascular:      Rate and Rhythm: Normal rate and regular rhythm.      Heart sounds: Normal heart sounds.   Pulmonary:      Effort: Pulmonary effort is normal. No respiratory distress (decreased bs at bases).   Abdominal:      General: There is no distension.      Palpations: Abdomen is soft.      Tenderness: There is no abdominal tenderness.   Musculoskeletal:      Cervical back: Neck supple.   Psychiatric:         Behavior: Behavior normal.         Results Review:       I reviewed the patient's new clinical results.  Results from last 7 days   Lab Units 21  0536 21  0607 21  0550 21  0551   WBC 10*3/mm3 12.65* 10.03 10.59 11.95*   HEMOGLOBIN g/dL 16.0 14.2 13.4 13.6   PLATELETS 10*3/mm3 283 281 259 293     Results from last 7 days   Lab Units 21  0535 21  0607 21  0550 21  0551   SODIUM mmol/L 137 138 134* 135*   POTASSIUM mmol/L 3.6 3.6 3.6 3.4*   CHLORIDE mmol/L 99 100 99 99   CO2 mmol/L 25.5 27.9 26.4 26.2   BUN mg/dL 10 9 13 20   CREATININE mg/dL 0.63* 0.58* 0.58* 0.70*   GLUCOSE mg/dL 92 93 91 98   Estimated Creatinine Clearance: 152.3 mL/min (A) (by C-G formula based  on SCr of 0.63 mg/dL (L)).  Results from last 7 days   Lab Units 03/09/21  0535 03/08/21  0607 03/07/21  0550 03/06/21  0551   ALBUMIN g/dL 2.80* 2.70* 2.30* 2.30*   BILIRUBIN mg/dL 0.4 0.3 0.4 0.6   ALK PHOS U/L 74 62 64 68   AST (SGOT) U/L 37 33 32 58*   ALT (SGPT) U/L 72* 56* 55* 65*     Results from last 7 days   Lab Units 03/09/21  0535 03/08/21  0607 03/07/21  0550 03/06/21  0551   CALCIUM mg/dL 8.7 7.9* 8.0* 8.3*   ALBUMIN g/dL 2.80* 2.70* 2.30* 2.30*   MAGNESIUM mg/dL 1.7 1.6 1.7 1.8     Results from last 7 days   Lab Units 03/05/21  0659   PROCALCITONIN ng/mL 0.10       Coag     HbA1C   Lab Results   Component Value Date    HGBA1C 6.14 (H) 03/05/2021     Infection   Results from last 7 days   Lab Units 03/05/21  0659   PROCALCITONIN ng/mL 0.10     Radiology(recent) No radiology results for the last day  No results found for: TROPONINT, TROPONINI, BNP  No components found for: TSH;2    allopurinol, 200 mg, Oral, Daily  aspirin, 81 mg, Oral, Daily  atorvastatin, 20 mg, Oral, Nightly  budesonide-formoterol, 2 puff, Inhalation, BID - RT  buPROPion SR, 450 mg, Oral, Daily  busPIRone, 15 mg, Oral, TID  clopidogrel, 75 mg, Oral, Daily  divalproex, 250 mg, Oral, BID  enoxaparin, 40 mg, Subcutaneous, Q12H  hydrocortisone-bacitracin-zinc oxide-nystatin, 1 application, Topical, BID  ipratropium-albuterol, 3 mL, Nebulization, 4x Daily - RT  levothyroxine, 25 mcg, Oral, Q AM  lisinopril, 2.5 mg, Oral, Daily  metoprolol tartrate, 50 mg, Oral, BID  montelukast, 10 mg, Oral, Nightly  nystatin, , Topical, Q12H  pantoprazole, 40 mg, Oral, Daily  predniSONE, 40 mg, Oral, Daily With Breakfast  vitamin D, 50,000 Units, Oral, Q7 Days       Diet Regular; Cardiac, Consistent Carbohydrate      Assessment/Plan      Active Hospital Problems    Diagnosis  POA   • **Pneumonia due to infectious organism [J18.9]  Yes   • Orthostatic hypotension [I95.1]  Yes   • Hypokalemia [E87.6]  No   • Vitamin D deficiency [E55.9]  Yes   • MITZI  (obstructive sleep apnea) [G47.33]  Yes   • Severe depression (CMS/HCC) [F32.2]  Yes   • HLD (hyperlipidemia) [E78.5]  Yes   • HTN (hypertension) [I10]  Yes   • History of stroke [Z86.73]  Not Applicable   • GERD without esophagitis [K21.9]  Yes   • Diastolic CHF, chronic (CMS/[facility]) [I50.32]  Yes   • COPD (chronic obstructive pulmonary disease) (CMS/[facility]) [J44.9]  Yes   • Dementia (CMS/[facility]) [F03.90]  Yes   • Lactic acidosis [E87.2]  Yes   • Leukocytosis [D72.829]  Yes   • Elevated d-dimer [R79.89]  Yes   • Type 2 diabetes mellitus with diabetic neuropathy, unspecified (CMS/[facility]) [E11.40]  Yes   • Acute respiratory failure with hypoxia (CMS/[facility]) [J96.01]  Yes      Resolved Hospital Problems   No resolved problems to display.       64yo gentleman sent from NH with SOA and hypoxia. Suffers from dementia and likely some metabolic encephalopathy on top of that due to infection. Admitted here with concern for recurrent PNA. Was just admitted to another facility for PNA with sepsis.      -Has been treated for bacterial pneumonia with IV Zosyn.  now p.o. Augmentin.  There was also possibility that this may be organizing pneumonia and is on prednisone p.o. per pulmonary with plans to continue at current dose x1 month and then have a gradual taper by outpatient pulmonologist.     Lightheaded when upright, quite orthostatic - recomend Job[employer] stockings     SLP eval very reassuring (now that he is more awake/alert), regular diet in place with swallow precautions, restarted home meds, though lower dose of benzo and depakote     DDimer elevated, no PE on CTA, venous u/s of legs neg for DVT     A1c only 6.14, continue to monitor accuchecks, sugars okay again today despite steroids     Vitamin D very low, started po replacement     Has h/o CVA with mild residual non-dominant left hemiparesis, at baseline        · Lovenox 40mg BID for DVT prophylaxis.  · Discussed with patient and nursing staff.  · Anticipate discharge back to SNU  facility, discussed with POA that I would recommend going to SNF to get stronger while making then plans for moving patient to [facility] staff        Hair Hyman MD  [facility]  03/11/21  15:30 EST

## 2021-03-11 NOTE — PROGRESS NOTES
Continued Stay Note  [facility]     Patient Name: Garcia Garcia  MRN: [id number]  Today's Date: 3/11/2021    Admit Date: 3/4/2021    Discharge Plan     Row Name 03/11/21 1013       Plan    Plan Comments  Spoke with Jackson/Sathish and he confirms that they are on schedule to pick patient up @ 1030.  Transfer packet updated and dc summary faxed to [place].  Script x1 copied and original placed in transfer packet.  COVID test was negative.  Communicated with Rossy/Carrie and she confirms that they can accept today.  Daughter in law, Lakshmi, updated and she is agreeable.  She states that will drop off patient's clothes and phone at [facility] about 1300. Philomena Jarrett RN    Final Discharge Disposition Code  03 - skilled nursing facility (SNF)    Final Note  DC to skilled bed at [facility] via [facility]. Philomena Jarrett RN        Discharge Codes    No documentation.       Expected Discharge Date and Time     Expected Discharge Date Expected Discharge Time    Mar 11, 2021             Philomena Jarrett RN